# Patient Record
Sex: MALE | Race: WHITE | NOT HISPANIC OR LATINO | Employment: FULL TIME | URBAN - METROPOLITAN AREA
[De-identification: names, ages, dates, MRNs, and addresses within clinical notes are randomized per-mention and may not be internally consistent; named-entity substitution may affect disease eponyms.]

---

## 2021-09-30 ENCOUNTER — LAB REQUISITION (OUTPATIENT)
Dept: LAB | Facility: HOSPITAL | Age: 61
End: 2021-09-30

## 2021-09-30 DIAGNOSIS — Z00.00 ENCOUNTER FOR GENERAL ADULT MEDICAL EXAMINATION WITHOUT ABNORMAL FINDINGS: ICD-10-CM

## 2021-09-30 LAB — SARS-COV-2 ORF1AB RESP QL NAA+PROBE: NOT DETECTED

## 2021-09-30 PROCEDURE — U0004 COV-19 TEST NON-CDC HGH THRU: HCPCS | Performed by: INTERNAL MEDICINE

## 2022-05-03 ENCOUNTER — APPOINTMENT (OUTPATIENT)
Dept: ULTRASOUND IMAGING | Facility: HOSPITAL | Age: 62
End: 2022-05-03

## 2022-05-03 ENCOUNTER — APPOINTMENT (OUTPATIENT)
Dept: GENERAL RADIOLOGY | Facility: HOSPITAL | Age: 62
End: 2022-05-03

## 2022-05-03 ENCOUNTER — HOSPITAL ENCOUNTER (EMERGENCY)
Facility: HOSPITAL | Age: 62
Discharge: HOME | End: 2022-05-03
Attending: EMERGENCY MEDICINE | Admitting: EMERGENCY MEDICINE

## 2022-05-03 VITALS
HEART RATE: 88 BPM | TEMPERATURE: 98.2 F | DIASTOLIC BLOOD PRESSURE: 112 MMHG | BODY MASS INDEX: 33.18 KG/M2 | OXYGEN SATURATION: 96 % | SYSTOLIC BLOOD PRESSURE: 166 MMHG | RESPIRATION RATE: 16 BRPM | HEIGHT: 72 IN | WEIGHT: 245 LBS

## 2022-05-03 DIAGNOSIS — M79.662 PAIN OF LEFT CALF: Primary | ICD-10-CM

## 2022-05-03 DIAGNOSIS — I10 HYPERTENSION, UNSPECIFIED TYPE: ICD-10-CM

## 2022-05-03 PROCEDURE — 73590 X-RAY EXAM OF LOWER LEG: CPT

## 2022-05-03 PROCEDURE — 99282 EMERGENCY DEPT VISIT SF MDM: CPT

## 2022-05-03 PROCEDURE — 99283 EMERGENCY DEPT VISIT LOW MDM: CPT | Performed by: EMERGENCY MEDICINE

## 2022-05-03 PROCEDURE — 93971 EXTREMITY STUDY: CPT

## 2022-05-03 RX ORDER — CYCLOBENZAPRINE HCL 10 MG
10 TABLET ORAL 3 TIMES DAILY PRN
Qty: 24 TABLET | Refills: 0 | Status: SHIPPED | OUTPATIENT
Start: 2022-05-03 | End: 2023-03-27

## 2022-05-03 RX ORDER — DICLOFENAC SODIUM 75 MG/1
75 TABLET, DELAYED RELEASE ORAL 2 TIMES DAILY PRN
Qty: 20 TABLET | Refills: 0 | Status: SHIPPED | OUTPATIENT
Start: 2022-05-03 | End: 2022-11-17

## 2022-05-03 NOTE — ED PROVIDER NOTES
Subjective     History provided by:  Patient    History of Present Illness    · Chief complaint: Left leg pain    · Location: Lateral aspect of the left calf.    · Quality/Severity: Pain is described as moderate and transient.    · Timing/Onset: Darted 5 days ago.    · Modifying Factors: He notices the pain primarily after he has an hour long drive to work.  Plantarflexion of the left ankle exacerbates the pain.  The pain is not present when he is up walking around at work.    · Associated symptoms: He denies any swelling of the lower extremity.  Denies any pain on the medial aspect of the left calf.  Denies ankle or knee pain.  Denies thigh pain or low back pain.    · Narrative: The patient is a 62-year-old white male complaining of a 5-day history of pain in the lateral aspect of the left calf.  The patient works as an  and drives an hour to work and notices the pain primarily just after he completes his long commute.  The pain usually subsides when he is up walking around at work.  He states plantar flexion of the left ankle exacerbates the pain.  He has had no swelling of the left lower extremity.  No recent trauma.  There is no pain in the medial aspect of the left lower leg.    Review of Systems   Constitutional: Negative for activity change, appetite change, chills, diaphoresis, fatigue and fever.   HENT: Negative for congestion, dental problem, ear pain, hearing loss, mouth sores, postnasal drip, rhinorrhea, sinus pressure, sore throat and voice change.    Eyes: Negative for photophobia, pain, discharge, redness and visual disturbance.   Respiratory: Negative for cough, chest tightness, shortness of breath, wheezing and stridor.    Cardiovascular: Negative for chest pain, palpitations and leg swelling.   Gastrointestinal: Negative for abdominal pain, diarrhea, nausea and vomiting.   Genitourinary: Negative for difficulty urinating, dysuria, flank pain, frequency, hematuria and urgency.  "  Musculoskeletal: Negative for arthralgias, back pain, gait problem, joint swelling, myalgias, neck pain and neck stiffness.   Skin: Negative for color change and rash.   Neurological: Negative for dizziness, tremors, seizures, syncope, facial asymmetry, speech difficulty, weakness, light-headedness, numbness and headaches.   Hematological: Negative for adenopathy.   Psychiatric/Behavioral: Negative.  Negative for confusion and decreased concentration. The patient is not nervous/anxious.      No past medical history on file.  BP (!) 162/102 (BP Location: Right arm, Patient Position: Sitting)   Pulse 82   Temp 98.2 °F (36.8 °C) (Oral)   Resp 16   Ht 182.9 cm (72\")   Wt 111 kg (245 lb)   SpO2 96%   BMI 33.23 kg/m²     No past medical history on file.    No Known Allergies    No past surgical history on file.    No family history on file.    Social History     Socioeconomic History   • Marital status: Unknown           Objective   Physical Exam  Vitals and nursing note reviewed.   Constitutional:       General: He is not in acute distress.     Appearance: Normal appearance. He is not ill-appearing, toxic-appearing or diaphoretic.      Comments: The patient appears healthy in no acute distress.  Review of his vital signs: His blood pressure is elevated 162/102, remainder vital signs within normal limits.   HENT:      Head: Normocephalic and atraumatic.   Eyes:      General: No scleral icterus.     Conjunctiva/sclera: Conjunctivae normal.   Musculoskeletal:         General: No swelling, tenderness, deformity or signs of injury. Normal range of motion.      Left lower leg: No edema.      Comments: The patient is no swelling or deformity of the left lower leg.  There is no swelling of the left ankle or foot.  The left foot is neurovascular intact.  There is no soft tissue tenderness of the left lower leg.  No varicose veins.  No venous cords.   Skin:     General: Skin is warm and dry.      Capillary Refill: " Capillary refill takes less than 2 seconds.      Coloration: Skin is not pale.      Findings: No bruising, erythema or rash.   Neurological:      General: No focal deficit present.      Mental Status: He is alert and oriented to person, place, and time.      Cranial Nerves: No cranial nerve deficit.      Sensory: No sensory deficit.      Motor: No weakness.   Psychiatric:         Mood and Affect: Mood normal.         Behavior: Behavior normal.         Thought Content: Thought content normal.         Judgment: Judgment normal.         Procedures           ED Course  ED Course as of 05/03/22 1257   Tue May 03, 2022   1244 X-rays of the patient's left tibia and fibula were normal to mine and the radiologist interpretation.  Venous Doppler of the left lower extremity was negative for DVT. [TP]   1245 Is my impression the patient's discomfort on his lateral left calf is due to muscle spasm/inflammation.  This is supported by plantar flexion of the left ankle exacerbates the discomfort.  His left foot is warm with good pulses and normal sensation.  There is no palpable tenderness.  He will be prescribed Voltaren.  He is instructed to apply heat to the affected area.  He will be referred to follow-up with [facility] if symptoms persist. [TP]   1251 The patient's blood pressure was noted to be elevated.  The patient states that his blood pressure is not normally elevated.  I instructed the patient to follow-up with his PCP Dr. Huggins concerning his blood pressure.  Also recommended he keep a log and check it daily. [TP]      ED Course User Index  [TP] Wilder Rico MD                                                 MDM  Number of Diagnoses or Management Options  Hypertension, unspecified type: new and does not require workup  Pain of left calf: new and requires workup     Amount and/or Complexity of Data Reviewed  Tests in the radiology section of CPT®: ordered and reviewed    Risk of Complications,  Morbidity, and/or Mortality  Presenting problems: moderate  Diagnostic procedures: high  Management options: moderate    Patient Progress  Patient progress: stable      Final diagnoses:   Pain of left calf   Hypertension, unspecified type       ED Disposition  ED Disposition     ED Disposition   Discharge    Condition   Stable    Comment   --             Gayathri Huggins MD  [address]  [place] [address] [phone]    Schedule an appointment as soon as possible for a visit   next available to follow-up on your blood pressure.    [facility], APRN  [address]  [address]  [place] [id number] [phone]    Schedule an appointment as soon as possible for a visit in 1 week  If your left calf pain does not start to improve.         Medication List      New Prescriptions    cyclobenzaprine 10 MG tablet  Commonly known as: FLEXERIL  Take 1 tablet by mouth 3 (Three) Times a Day As Needed for Muscle Spasms for up to 24 doses.     diclofenac 75 MG EC tablet  Commonly known as: VOLTAREN  Take 1 tablet by mouth 2 (Two) Times a Day As Needed (Pain) for up to 20 doses.           Where to Get Your Medications      These medications were sent to [id number] [place] [address] [address] [facility] [address] - [phone] PH - [phone]   [address], [place] [address]    Phone: [phone]   · cyclobenzaprine 10 MG tablet  · diclofenac 75 MG EC tablet          Wilder Rico MD  05/03/22 1106      Labs Reviewed - No data to display  US Venous Doppler Lower Extremity Left (duplex)   Final Result   Negative examination.  No evidence of left lower extremity DVT.       This report was finalized on 5/3/2022 12:37 PM by Dr. Ramirez Phelps MD.          XR Tibia Fibula 2 View Left   Final Result   Negative left tibia/fibula.       This report was finalized on 5/3/2022 12:38 PM by Dr. Ramirez Phelps MD.                 Medication List      New Prescriptions     cyclobenzaprine 10 MG tablet  Commonly known as: FLEXERIL  Take 1 tablet by mouth 3 (Three) Times a Day As Needed for Muscle Spasms for up to 24 doses.     diclofenac 75 MG EC tablet  Commonly known as: VOLTAREN  Take 1 tablet by mouth 2 (Two) Times a Day As Needed (Pain) for up to 20 doses.           Where to Get Your Medications      These medications were sent to [id number] - [place] [facility] [address] - [phone]  - [phone] [address] [address]    Phone: [phone]   · cyclobenzaprine 10 MG tablet  · diclofenac 75 MG EC tablet              Wilder Rico MD  05/03/22 3143

## 2022-05-05 ENCOUNTER — TELEPHONE (OUTPATIENT)
Dept: ORTHOPEDIC SURGERY | Facility: CLINIC | Age: 62
End: 2022-05-05

## 2022-11-17 ENCOUNTER — OFFICE VISIT (OUTPATIENT)
Dept: ORTHOPEDIC SURGERY | Facility: CLINIC | Age: 62
End: 2022-11-17

## 2022-11-17 VITALS
BODY MASS INDEX: 33.86 KG/M2 | DIASTOLIC BLOOD PRESSURE: 91 MMHG | SYSTOLIC BLOOD PRESSURE: 165 MMHG | HEIGHT: 72 IN | HEART RATE: 73 BPM | WEIGHT: 250 LBS

## 2022-11-17 DIAGNOSIS — G89.29 CHRONIC LEFT SHOULDER PAIN: Primary | ICD-10-CM

## 2022-11-17 DIAGNOSIS — M75.112 NONTRAUMATIC INCOMPLETE TEAR OF LEFT ROTATOR CUFF: ICD-10-CM

## 2022-11-17 DIAGNOSIS — M25.512 CHRONIC LEFT SHOULDER PAIN: Primary | ICD-10-CM

## 2022-11-17 PROCEDURE — 99203 OFFICE O/P NEW LOW 30 MIN: CPT | Performed by: INTERNAL MEDICINE

## 2022-11-17 PROCEDURE — 20610 DRAIN/INJ JOINT/BURSA W/O US: CPT | Performed by: INTERNAL MEDICINE

## 2022-11-17 PROCEDURE — 73030 X-RAY EXAM OF SHOULDER: CPT | Performed by: INTERNAL MEDICINE

## 2022-11-17 RX ORDER — MODAFINIL 200 MG/1
TABLET ORAL
COMMUNITY
Start: 2022-11-06

## 2022-11-17 RX ORDER — TRIAMCINOLONE ACETONIDE 40 MG/ML
80 INJECTION, SUSPENSION INTRA-ARTICULAR; INTRAMUSCULAR
Status: COMPLETED | OUTPATIENT
Start: 2022-11-17 | End: 2022-11-17

## 2022-11-17 RX ORDER — OXYCODONE HYDROCHLORIDE 15 MG/1
TABLET ORAL
COMMUNITY
Start: 2022-10-20 | End: 2023-03-27

## 2022-11-17 RX ORDER — PANTOPRAZOLE SODIUM 40 MG/1
40 TABLET, DELAYED RELEASE ORAL DAILY
COMMUNITY
End: 2023-03-27 | Stop reason: SDUPTHER

## 2022-11-17 RX ORDER — ZOLPIDEM TARTRATE 10 MG/1
TABLET ORAL
COMMUNITY
Start: 2022-11-06

## 2022-11-17 RX ORDER — LIDOCAINE HYDROCHLORIDE 10 MG/ML
4 INJECTION, SOLUTION EPIDURAL; INFILTRATION; INTRACAUDAL; PERINEURAL
Status: COMPLETED | OUTPATIENT
Start: 2022-11-17 | End: 2022-11-17

## 2022-11-17 RX ADMIN — LIDOCAINE HYDROCHLORIDE 4 ML: 10 INJECTION, SOLUTION EPIDURAL; INFILTRATION; INTRACAUDAL; PERINEURAL at 10:04

## 2022-11-17 RX ADMIN — TRIAMCINOLONE ACETONIDE 80 MG: 40 INJECTION, SUSPENSION INTRA-ARTICULAR; INTRAMUSCULAR at 10:04

## 2022-11-17 NOTE — PROGRESS NOTES
"Subjective:     Patient ID: Sawyer Moraes is a 62 y.o. male.    Chief Complaint:    History of Present Illness  Sawyer Moraes presents to clinic today for evaluation of  left shoulder pain.  The patient was a previous patient of [facility] orth[facility] but recently moved to [place] and would like to establish care here.  He had MRI of his left shoulder in the past for left shoulder pain which showed some mild to moderate tearing of his rotator cuff.  He did multiple rounds of physical therapy had multiple injections which alleviated all of his symptoms.  He is here today for another injection.  He states that he has some pain but does not endorse significant weakness.  He works as an  and does a lot of overhead activities.  He is right-hand dominant.     Social History     Occupational History   • Not on file   Tobacco Use   • Smoking status: Never     Passive exposure: Never   • Smokeless tobacco: Never   Vaping Use   • Vaping Use: Never used   Substance and Sexual Activity   • Alcohol use: Yes     Alcohol/week: 1.0 standard drink     Types: 1 Shots of liquor per week     Comment: Occasional drink with dinner   • Drug use: Never   • Sexual activity: Yes     Partners: Female     Birth control/protection: Vasectomy      Past Medical History:   Diagnosis Date   • Knee swelling 2020    Saw Dr. Rivas Patton for knee problem.   • Lumbosacral disc disease 2015 herniated disk    Epdural helped. Take oxycodone for pain   • Rotator cuff syndrome 2020    Was seeing Zulay prakash, have been taking Cortisone shots which help. PT. at [facility] for 6 months helped. Pain is returning.     History reviewed. No pertinent surgical history.    Family History   Problem Relation Age of Onset   • Diabetes Father    • Heart disease Father                  Objective:  Vitals:    11/17/22 0917   BP: 165/91   Pulse: 73   Weight: 113 kg (250 lb)   Height: 182.9 cm (72\")         11/17/22 0917   Weight: 113 kg (250 lb)     Body mass " index is 33.91 kg/m².        Right Shoulder Exam     Range of Motion   Internal rotation 0 degrees: normal   Internal rotation 90 degrees: normal     Muscle Strength   Abduction: 5/5   Internal rotation: 5/5   External rotation: 5/5   Supraspinatus: 5/5   Subscapularis: 5/5   Biceps: 5/5       Left Shoulder Exam     Tenderness   Left shoulder tenderness location: Global.    Range of Motion   Active abduction: normal   Passive abduction: normal   Extension: normal   External rotation: normal   Forward flexion: normal   Internal rotation 0 degrees: abnormal   Internal rotation 90 degrees: normal     Muscle Strength   Abduction: 5/5   Internal rotation: 5/5   External rotation: 5/5   Supraspinatus: 5/5   Subscapularis: 4/5   Biceps: 5/5     Tests   Apprehension: negative  Impingement: negative  Drop arm: negative    Other   Erythema: absent  Scars: absent  Sensation: normal  Pulse: present                Imaging: Views of the left shoulder ordered and reviewed by myself in the office today  Indication: Shoulder pain  Findings: X-rays demonstrate mild glenohumeral and AC joint arthritis.  Glenohumeral joint appears to be reduced without superior migration.  Comparative studies: None    Assessment:        1. Chronic left shoulder pain    2. Nontraumatic incomplete tear of left rotator cuff           Plan:    Large Joint Arthrocentesis: L subacromial bursa  Date/Time: 11/17/2022 10:04 AM  Consent given by: patient  Site marked: site marked  Timeout: Immediately prior to procedure a time out was called to verify the correct patient, procedure, equipment, support staff and site/side marked as required   Supporting Documentation  Indications: pain   Procedure Details  Location: shoulder - L subacromial bursa  Preparation: Patient was prepped and draped in the usual sterile fashion  Needle size: 22 G  Medications administered: 4 mL lidocaine PF 1% 1 %; 80 mg triamcinolone acetonide 40 MG/ML  Patient tolerance: patient  tolerated the procedure well with no immediate complications                  1. Discussed treatment options at length with patient at today's visit.  I think the majority of his symptoms are coming from his known incomplete rotator cuff tear of the supraspinatus and subscapularis.  At this time the patient is very active and works as an .  He is not currently interested in surgical intervention.  He would like to have a shot today and continue to work on his home exercises.  2. Follow up: As needed.  If he decides that he would like to discuss surgical intervention he should follow-up with Dr. Altman.      Sawyer Moraes was in agreement with plan and had all questions answered.     Medications:  No orders of the defined types were placed in this encounter.      Followup:  No follow-ups on file.    Diagnoses and all orders for this visit:    1. Chronic left shoulder pain (Primary)  -     XR Shoulder 2+ View Left    2. Nontraumatic incomplete tear of left rotator cuff          Dictated utilizing Dragon dictation

## 2023-01-31 ENCOUNTER — OFFICE VISIT (OUTPATIENT)
Dept: ORTHOPEDIC SURGERY | Facility: CLINIC | Age: 63
End: 2023-01-31
Payer: COMMERCIAL

## 2023-01-31 VITALS
DIASTOLIC BLOOD PRESSURE: 90 MMHG | WEIGHT: 240 LBS | SYSTOLIC BLOOD PRESSURE: 156 MMHG | HEART RATE: 67 BPM | BODY MASS INDEX: 32.51 KG/M2 | HEIGHT: 72 IN

## 2023-01-31 DIAGNOSIS — M25.511 RIGHT SHOULDER PAIN, UNSPECIFIED CHRONICITY: Primary | ICD-10-CM

## 2023-01-31 PROCEDURE — 99213 OFFICE O/P EST LOW 20 MIN: CPT | Performed by: INTERNAL MEDICINE

## 2023-01-31 PROCEDURE — 20610 DRAIN/INJ JOINT/BURSA W/O US: CPT | Performed by: INTERNAL MEDICINE

## 2023-01-31 PROCEDURE — 73030 X-RAY EXAM OF SHOULDER: CPT | Performed by: INTERNAL MEDICINE

## 2023-01-31 RX ORDER — OXYCODONE HYDROCHLORIDE 15 MG/1
15 TABLET ORAL
COMMUNITY
Start: 2023-01-12

## 2023-01-31 RX ORDER — LIDOCAINE HYDROCHLORIDE 10 MG/ML
4 INJECTION, SOLUTION EPIDURAL; INFILTRATION; INTRACAUDAL; PERINEURAL
Status: COMPLETED | OUTPATIENT
Start: 2023-01-31 | End: 2023-01-31

## 2023-01-31 RX ORDER — FLUTICASONE PROPIONATE 50 MCG
1 SPRAY, SUSPENSION (ML) NASAL DAILY
COMMUNITY
Start: 2023-01-26

## 2023-01-31 RX ORDER — TRIAMCINOLONE ACETONIDE 40 MG/ML
80 INJECTION, SUSPENSION INTRA-ARTICULAR; INTRAMUSCULAR
Status: COMPLETED | OUTPATIENT
Start: 2023-01-31 | End: 2023-01-31

## 2023-01-31 RX ORDER — FLUTICASONE FUROATE 100 UG/1
1 POWDER RESPIRATORY (INHALATION) DAILY
COMMUNITY
Start: 2023-01-12 | End: 2023-03-27

## 2023-01-31 RX ORDER — FOLIC ACID 1 MG/1
1 TABLET ORAL DAILY
COMMUNITY
Start: 2022-12-29

## 2023-01-31 RX ADMIN — LIDOCAINE HYDROCHLORIDE 4 ML: 10 INJECTION, SOLUTION EPIDURAL; INFILTRATION; INTRACAUDAL; PERINEURAL at 13:27

## 2023-01-31 RX ADMIN — TRIAMCINOLONE ACETONIDE 80 MG: 40 INJECTION, SUSPENSION INTRA-ARTICULAR; INTRAMUSCULAR at 13:27

## 2023-01-31 NOTE — PROGRESS NOTES
Subjective:     Patient ID: Sawyer Moraes is a 62 y.o. male.    Chief Complaint:    History of Present Illness  Sawyer Moraes returns to clinic today for evaluation of a new problem with his right shoulder.  The patient was seen by myself back in November for his left shoulder and there was concern for rotator cuff tendinitis as the patient does a significant amount of overhead work as an  for [employer].  He denies any traumatic inciting event for his right shoulder and denies any significant limitations in range of motion but there is pain at terminal abduction and forward flexion.  He states it feels just like his left shoulder did and he is hopeful to get a injection into his right shoulder since the left shoulder feels most completely better with 1 shot.     Social History     Occupational History   • Not on file   Tobacco Use   • Smoking status: Never     Passive exposure: Never   • Smokeless tobacco: Never   Vaping Use   • Vaping Use: Never used   Substance and Sexual Activity   • Alcohol use: Yes     Alcohol/week: 1.0 standard drink     Types: 1 Shots of liquor per week     Comment: Occasional drink with dinner   • Drug use: Never   • Sexual activity: Yes     Partners: Female     Birth control/protection: Vasectomy      Past Medical History:   Diagnosis Date   • Knee swelling 2020    Saw Dr. Rivas Patton for knee problem.   • Lumbosacral disc disease 2015 herniated disk    Epdural helped. Take oxycodone for pain   • Rotator cuff syndrome 2020    Was seeing Zulay prakash, have been taking Cortisone shots which help. PT. at [facility] for 6 months helped. Pain is returning.     History reviewed. No pertinent surgical history.    Family History   Problem Relation Age of Onset   • Diabetes Father    • Heart disease Father                  Objective:  There were no vitals filed for this visit.  There were no vitals filed for this visit.  There is no height or weight on file to calculate BMI.        Right  Shoulder Exam     Range of Motion   Active abduction: 120   Passive abduction: 130   Extension: 10   External rotation: 60   Forward flexion: 130   Internal rotation 0 degrees: Sacrum     Muscle Strength   Abduction: 4/5   Internal rotation: 4/5   External rotation: 4/5   Supraspinatus: 4/5   Subscapularis: 4/5   Biceps: 5/5     Other   Erythema: absent  Scars: absent  Sensation: normal  Pulse: present      Left Shoulder Exam     Range of Motion   Active abduction: 130   Extension: 20   External rotation: 60   Forward flexion: 130   Internal rotation 0 degrees: Lumbar     Muscle Strength   Abduction: 5/5   Internal rotation: 5/5   External rotation: 5/5   Supraspinatus: 5/5   Subscapularis: 5/5   Biceps: 5/5                Imaging: 3 views the right shoulder ordered and reviewed by myself in the office today  Indication: Right shoulder pain  Findings: X-rays demonstrate no acute osseous abnormality.  No significant glenohumeral degenerative changes.  There is moderate  Comparative studies: none    Assessment:      No diagnosis found.       Plan:  Large Joint Arthrocentesis: R subacromial bursa  Date/Time: 1/31/2023 1:27 PM  Consent given by: patient  Site marked: site marked  Timeout: Immediately prior to procedure a time out was called to verify the correct patient, procedure, equipment, support staff and site/side marked as required   Supporting Documentation  Indications: pain   Procedure Details  Location: shoulder - R subacromial bursa  Preparation: Patient was prepped and draped in the usual sterile fashion  Needle size: 22 G  Approach: posterior  Medications administered: 80 mg triamcinolone acetonide 40 MG/ML; 4 mL lidocaine PF 1% 1 %  Patient tolerance: patient tolerated the procedure well with no immediate complications                1. Discussed treatment options at length with patient at today's visit.  I discussed with the patient that because of his gradual onset of symptoms and lack of traumatic  inciting event the likelihood of him having a large rotator cuff tear is small.  I discussed with him that since the pain feels just like the other side and is attributed to mostly overhead work likely represents rotator cuff tendinitis.  I discussed with him begin for another injection today if he would like.  Discussed with him that if he fails to have resolution of symptoms or improvement he may call back to the office and we can consider ordering an MRI.  2. Follow up: stephon Moraes was in agreement with plan and had all questions answered.     Medications:  No orders of the defined types were placed in this encounter.      Followup:  No follow-ups on file.    There are no diagnoses linked to this encounter.      Dictated utilizing Dragon dictation

## 2023-03-27 ENCOUNTER — OFFICE VISIT (OUTPATIENT)
Dept: GASTROENTEROLOGY | Facility: CLINIC | Age: 63
End: 2023-03-27
Payer: COMMERCIAL

## 2023-03-27 VITALS
WEIGHT: 249.2 LBS | BODY MASS INDEX: 33.75 KG/M2 | HEIGHT: 72 IN | SYSTOLIC BLOOD PRESSURE: 158 MMHG | DIASTOLIC BLOOD PRESSURE: 90 MMHG

## 2023-03-27 DIAGNOSIS — K21.00 GASTROESOPHAGEAL REFLUX DISEASE WITH ESOPHAGITIS WITHOUT HEMORRHAGE: ICD-10-CM

## 2023-03-27 DIAGNOSIS — R13.19 ESOPHAGEAL DYSPHAGIA: Primary | ICD-10-CM

## 2023-03-27 PROCEDURE — 99204 OFFICE O/P NEW MOD 45 MIN: CPT

## 2023-03-27 RX ORDER — PANTOPRAZOLE SODIUM 40 MG/1
40 TABLET, DELAYED RELEASE ORAL DAILY
Qty: 30 TABLET | Refills: 11 | Status: ON HOLD | OUTPATIENT
Start: 2023-03-27 | End: 2023-04-04 | Stop reason: SDUPTHER

## 2023-03-27 RX ORDER — SUCRALFATE 1 G/1
1 TABLET ORAL
COMMUNITY

## 2023-03-27 NOTE — PROGRESS NOTES
PATIENT INFORMATION  Sawyer Moraes       - 1960    CHIEF COMPLAINT  Chief Complaint   Patient presents with   • Heartburn   • Difficulty Swallowing       HISTORY OF PRESENT ILLNESS  Here today for evaluation of GERD    Prescribed pantoprazole and carafate, but not taking PPI. If drinks acidic drinks gets severe heart burn, tries to drink more water. HB 2 days out of the week. Once a week dysphagia with steak or other hard foods. Cannot drink when food gets stuck. Has to vomit back up when stuck. No nausea. Some bloating, belching. No NSAIDs or OTC AA.     EGD 10 yrs ago and was prescribed PPI, was stopped recently because creatinine elevated, is now on Carafate 3 times a day if remember. Is seeing renal specialist.    Colonoscopy a year ago. Polyps removed not sure when recall.    Kidney specialist next month.      REVIEWED PERTINENT RESULTS/ LABS  No results found for: CASEREPORT, FINALDX  Lab Results   Component Value Date    HGB 15.1 2021    MCV 91.8 2021     2021    ALT 22 2021    AST 27 2021      No results found.    REVIEW OF SYSTEMS  Review of Systems   Constitutional: Negative.    HENT: Positive for trouble swallowing.    Eyes: Negative.    Respiratory: Negative.    Cardiovascular: Negative.    Gastrointestinal: Positive for constipation. Negative for abdominal pain, diarrhea, nausea and vomiting.        GERD   Endocrine: Negative.    Genitourinary: Negative.    Musculoskeletal: Positive for back pain and neck pain.   Skin: Negative.    Allergic/Immunologic: Negative.    Neurological: Negative.    Hematological: Negative.    Psychiatric/Behavioral: The patient is nervous/anxious.          ACTIVE PROBLEMS  Patient Active Problem List    Diagnosis    • Gastroesophageal reflux disease with esophagitis without hemorrhage [K21.00]          PAST MEDICAL HISTORY  Past Medical History:   Diagnosis Date   • Knee swelling     Saw Dr. Rivas Patton for knee problem.   •  "Lumbosacral disc disease 2015 herniated disk    Epdural helped. Take oxycodone for pain   • Rotator cuff syndrome 2020    Was seeing Zulay prakash, have been taking Cortisone shots which help. PT. at [facility] for 6 months helped. Pain is returning.         SURGICAL HISTORY  History reviewed. No pertinent surgical history.      FAMILY HISTORY  Family History   Problem Relation Age of Onset   • Diabetes Father    • Heart disease Father          SOCIAL HISTORY  Social History     Occupational History   • Not on file   Tobacco Use   • Smoking status: Never     Passive exposure: Never   • Smokeless tobacco: Never   Vaping Use   • Vaping Use: Never used   Substance and Sexual Activity   • Alcohol use: Yes     Alcohol/week: 1.0 standard drink     Types: 1 Shots of liquor per week     Comment: Occasional drink with dinner   • Drug use: Never   • Sexual activity: Yes     Partners: Female     Birth control/protection: Vasectomy         CURRENT MEDICATIONS    Current Outpatient Medications:   •  fluticasone (FLONASE) 50 MCG/ACT nasal spray, 1 spray by Each Nare route Daily., Disp: , Rfl:   •  folic acid (FOLVITE) 1 MG tablet, Take 1 tablet by mouth Daily., Disp: , Rfl:   •  modafinil (PROVIGIL) 200 MG tablet, , Disp: , Rfl:   •  oxyCODONE (ROXICODONE) 15 MG immediate release tablet, Take 1 tablet by mouth., Disp: , Rfl:   •  sucralfate (CARAFATE) 1 g tablet, Take 1 tablet by mouth., Disp: , Rfl:   •  zolpidem (AMBIEN) 10 MG tablet, , Disp: , Rfl:   •  pantoprazole (PROTONIX) 40 MG EC tablet, Take 40 mg by mouth Daily. (Patient not taking: Reported on 3/27/2023), Disp: , Rfl:     ALLERGIES  Aspirin, Ibuprofen, and Nsaids    VITALS  Vitals:    03/27/23 0840   BP: 158/90   BP Location: Left arm   Patient Position: Sitting   Cuff Size: Adult   Weight: 113 kg (249 lb 3.2 oz)   Height: 182.9 cm (72.01\")       PHYSICAL EXAM  Debilities/Disabilities Identified: None  Emotional Behavior: Appropriate  Wt Readings from Last 3 " "Encounters:   03/27/23 113 kg (249 lb 3.2 oz)   01/31/23 109 kg (240 lb)   11/17/22 113 kg (250 lb)     Ht Readings from Last 1 Encounters:   03/27/23 182.9 cm (72.01\")     Body mass index is 33.79 kg/m².  Physical Exam  Constitutional:       General: He is not in acute distress.     Appearance: Normal appearance. He is not ill-appearing.   HENT:      Head: Normocephalic and atraumatic.      Mouth/Throat:      Mouth: Mucous membranes are moist.      Pharynx: No posterior oropharyngeal erythema.   Eyes:      General: No scleral icterus.  Cardiovascular:      Rate and Rhythm: Normal rate and regular rhythm.      Heart sounds: Normal heart sounds.   Pulmonary:      Effort: Pulmonary effort is normal.      Breath sounds: Normal breath sounds.   Abdominal:      General: Abdomen is flat. Bowel sounds are normal. There is no distension.      Palpations: Abdomen is soft. There is no mass.      Tenderness: There is no abdominal tenderness. There is no guarding or rebound. Negative signs include Fu's sign.      Hernia: No hernia is present.   Musculoskeletal:      Cervical back: Neck supple.   Skin:     General: Skin is warm.      Capillary Refill: Capillary refill takes less than 2 seconds.   Neurological:      General: No focal deficit present.      Mental Status: He is alert and oriented to person, place, and time.   Psychiatric:         Mood and Affect: Mood normal.         Behavior: Behavior normal.         Thought Content: Thought content normal.         Judgment: Judgment normal.         CLINICAL DATA REVIEWED   reviewed previous lab results and integrated with today's visit, reviewed notes from other physicians and/or last GI encounter, reviewed previous endoscopy results and available photos, reviewed surgical pathology results from previous biopsies    ASSESSMENT  Diagnoses and all orders for this visit:    Esophageal dysphagia  -     Case Request; Standing  -     Case Request    Gastroesophageal reflux disease " with esophagitis without hemorrhage    Other orders  -     Discontinue: DICLOFENAC PO; if needed cream  -     sucralfate (CARAFATE) 1 g tablet; Take 1 tablet by mouth.  -     Follow Anesthesia Guidelines / Protocol; Future  -     Obtain Informed Consent; Standing          PLAN    Resume PPI after labs next week, review with renal  EGD  Avoid meats, stick with purees until EGD, proceed to ER in case of impaction    Return in about 3 months (around 6/27/2023).    I have discussed the above plan with the patient.  They verbalize understanding and are in agreement with the plan.  They have been advised to contact the office for any questions, concerns, or changes related to their health.

## 2023-03-27 NOTE — H&P (VIEW-ONLY)
PATIENT INFORMATION  Sawyer Moraes       - 1960    CHIEF COMPLAINT  Chief Complaint   Patient presents with   • Heartburn   • Difficulty Swallowing       HISTORY OF PRESENT ILLNESS  Here today for evaluation of GERD    Prescribed pantoprazole and carafate, but not taking PPI. If drinks acidic drinks gets severe heart burn, tries to drink more water. HB 2 days out of the week. Once a week dysphagia with steak or other hard foods. Cannot drink when food gets stuck. Has to vomit back up when stuck. No nausea. Some bloating, belching. No NSAIDs or OTC AA.     EGD 10 yrs ago and was prescribed PPI, was stopped recently because creatinine elevated, is now on Carafate 3 times a day if remember. Is seeing renal specialist.    Colonoscopy a year ago. Polyps removed not sure when recall.    Kidney specialist next month.      REVIEWED PERTINENT RESULTS/ LABS  No results found for: CASEREPORT, FINALDX  Lab Results   Component Value Date    HGB 15.1 2021    MCV 91.8 2021     2021    ALT 22 2021    AST 27 2021      No results found.    REVIEW OF SYSTEMS  Review of Systems   Constitutional: Negative.    HENT: Positive for trouble swallowing.    Eyes: Negative.    Respiratory: Negative.    Cardiovascular: Negative.    Gastrointestinal: Positive for constipation. Negative for abdominal pain, diarrhea, nausea and vomiting.        GERD   Endocrine: Negative.    Genitourinary: Negative.    Musculoskeletal: Positive for back pain and neck pain.   Skin: Negative.    Allergic/Immunologic: Negative.    Neurological: Negative.    Hematological: Negative.    Psychiatric/Behavioral: The patient is nervous/anxious.          ACTIVE PROBLEMS  Patient Active Problem List    Diagnosis    • Gastroesophageal reflux disease with esophagitis without hemorrhage [K21.00]          PAST MEDICAL HISTORY  Past Medical History:   Diagnosis Date   • Knee swelling     Saw Dr. Rivas Patton for knee problem.   •  "Lumbosacral disc disease 2015 herniated disk    Epdural helped. Take oxycodone for pain   • Rotator cuff syndrome 2020    Was seeing Zulay prakash, have been taking Cortisone shots which help. PT. at [facility] for 6 months helped. Pain is returning.         SURGICAL HISTORY  History reviewed. No pertinent surgical history.      FAMILY HISTORY  Family History   Problem Relation Age of Onset   • Diabetes Father    • Heart disease Father          SOCIAL HISTORY  Social History     Occupational History   • Not on file   Tobacco Use   • Smoking status: Never     Passive exposure: Never   • Smokeless tobacco: Never   Vaping Use   • Vaping Use: Never used   Substance and Sexual Activity   • Alcohol use: Yes     Alcohol/week: 1.0 standard drink     Types: 1 Shots of liquor per week     Comment: Occasional drink with dinner   • Drug use: Never   • Sexual activity: Yes     Partners: Female     Birth control/protection: Vasectomy         CURRENT MEDICATIONS    Current Outpatient Medications:   •  fluticasone (FLONASE) 50 MCG/ACT nasal spray, 1 spray by Each Nare route Daily., Disp: , Rfl:   •  folic acid (FOLVITE) 1 MG tablet, Take 1 tablet by mouth Daily., Disp: , Rfl:   •  modafinil (PROVIGIL) 200 MG tablet, , Disp: , Rfl:   •  oxyCODONE (ROXICODONE) 15 MG immediate release tablet, Take 1 tablet by mouth., Disp: , Rfl:   •  sucralfate (CARAFATE) 1 g tablet, Take 1 tablet by mouth., Disp: , Rfl:   •  zolpidem (AMBIEN) 10 MG tablet, , Disp: , Rfl:   •  pantoprazole (PROTONIX) 40 MG EC tablet, Take 40 mg by mouth Daily. (Patient not taking: Reported on 3/27/2023), Disp: , Rfl:     ALLERGIES  Aspirin, Ibuprofen, and Nsaids    VITALS  Vitals:    03/27/23 0840   BP: 158/90   BP Location: Left arm   Patient Position: Sitting   Cuff Size: Adult   Weight: 113 kg (249 lb 3.2 oz)   Height: 182.9 cm (72.01\")       PHYSICAL EXAM  Debilities/Disabilities Identified: None  Emotional Behavior: Appropriate  Wt Readings from Last 3 " "Encounters:   03/27/23 113 kg (249 lb 3.2 oz)   01/31/23 109 kg (240 lb)   11/17/22 113 kg (250 lb)     Ht Readings from Last 1 Encounters:   03/27/23 182.9 cm (72.01\")     Body mass index is 33.79 kg/m².  Physical Exam  Constitutional:       General: He is not in acute distress.     Appearance: Normal appearance. He is not ill-appearing.   HENT:      Head: Normocephalic and atraumatic.      Mouth/Throat:      Mouth: Mucous membranes are moist.      Pharynx: No posterior oropharyngeal erythema.   Eyes:      General: No scleral icterus.  Cardiovascular:      Rate and Rhythm: Normal rate and regular rhythm.      Heart sounds: Normal heart sounds.   Pulmonary:      Effort: Pulmonary effort is normal.      Breath sounds: Normal breath sounds.   Abdominal:      General: Abdomen is flat. Bowel sounds are normal. There is no distension.      Palpations: Abdomen is soft. There is no mass.      Tenderness: There is no abdominal tenderness. There is no guarding or rebound. Negative signs include Fu's sign.      Hernia: No hernia is present.   Musculoskeletal:      Cervical back: Neck supple.   Skin:     General: Skin is warm.      Capillary Refill: Capillary refill takes less than 2 seconds.   Neurological:      General: No focal deficit present.      Mental Status: He is alert and oriented to person, place, and time.   Psychiatric:         Mood and Affect: Mood normal.         Behavior: Behavior normal.         Thought Content: Thought content normal.         Judgment: Judgment normal.         CLINICAL DATA REVIEWED   reviewed previous lab results and integrated with today's visit, reviewed notes from other physicians and/or last GI encounter, reviewed previous endoscopy results and available photos, reviewed surgical pathology results from previous biopsies    ASSESSMENT  Diagnoses and all orders for this visit:    Esophageal dysphagia  -     Case Request; Standing  -     Case Request    Gastroesophageal reflux disease " with esophagitis without hemorrhage    Other orders  -     Discontinue: DICLOFENAC PO; if needed cream  -     sucralfate (CARAFATE) 1 g tablet; Take 1 tablet by mouth.  -     Follow Anesthesia Guidelines / Protocol; Future  -     Obtain Informed Consent; Standing          PLAN    Resume PPI after labs next week, review with renal  EGD  Avoid meats, stick with purees until EGD, proceed to ER in case of impaction    Return in about 3 months (around 6/27/2023).    I have discussed the above plan with the patient.  They verbalize understanding and are in agreement with the plan.  They have been advised to contact the office for any questions, concerns, or changes related to their health.

## 2023-03-31 NOTE — SIGNIFICANT NOTE
Education provided the Patient on the following:    - Nothing to Eat or Drink after MN the night before the procedure  -You will need to have someone drive you home after your EGD and remain with you for 24 hours after the EGD  - The date of your EGD, your are welcome to have one visitor at bedside or remain within 10-15 minutes of [facility]  -Please wear warm socks when you arrive for your EGD  -Remove all jewelry and leave any valuables before arriving on the date of your procedure (all will have to be removed before leaving preop)  -You will need to arrive at 1430 on 4/4/23 EGD  -Feel free to contact us at: [phone] with any additional questions/concerns

## 2023-04-03 ENCOUNTER — LAB (OUTPATIENT)
Dept: LAB | Facility: HOSPITAL | Age: 63
End: 2023-04-03
Payer: COMMERCIAL

## 2023-04-03 ENCOUNTER — TRANSCRIBE ORDERS (OUTPATIENT)
Dept: ADMINISTRATIVE | Facility: HOSPITAL | Age: 63
End: 2023-04-03
Payer: COMMERCIAL

## 2023-04-03 DIAGNOSIS — M15.0 PRIMARY GENERALIZED HYPERTROPHIC OSTEOARTHROSIS: ICD-10-CM

## 2023-04-03 DIAGNOSIS — R79.89 HYPOURICEMIA: ICD-10-CM

## 2023-04-03 DIAGNOSIS — R79.89 HYPOURICEMIA: Primary | ICD-10-CM

## 2023-04-03 LAB
ALBUMIN SERPL-MCNC: 4.5 G/DL (ref 3.5–5.2)
ALBUMIN UR-MCNC: 2.8 MG/DL
ANION GAP SERPL CALCULATED.3IONS-SCNC: 12.1 MMOL/L (ref 5–15)
BACTERIA UR QL AUTO: ABNORMAL /HPF
BILIRUB UR QL STRIP: NEGATIVE
BUN SERPL-MCNC: 15 MG/DL (ref 8–23)
BUN/CREAT SERPL: 11.8 (ref 7–25)
CALCIUM SPEC-SCNC: 9.9 MG/DL (ref 8.6–10.5)
CHLORIDE SERPL-SCNC: 104 MMOL/L (ref 98–107)
CLARITY UR: ABNORMAL
CO2 SERPL-SCNC: 24.9 MMOL/L (ref 22–29)
COLLECT DURATION TIME UR: 24 HRS
COLOR UR: YELLOW
CREAT SERPL-MCNC: 1.27 MG/DL (ref 0.76–1.27)
CREAT UR-MCNC: 136 MG/DL
CREAT UR-MCNC: 140.5 MG/DL
CREAT UR-MCNC: 140.5 MG/DL
CREATINE 24H UR-MRATE: 1.9 G/24 HR (ref 1–2.4)
EGFRCR SERPLBLD CKD-EPI 2021: 64.3 ML/MIN/1.73
GLUCOSE SERPL-MCNC: 101 MG/DL (ref 65–99)
GLUCOSE UR STRIP-MCNC: NEGATIVE MG/DL
HGB UR QL STRIP.AUTO: ABNORMAL
HYALINE CASTS UR QL AUTO: ABNORMAL /LPF
KETONES UR QL STRIP: NEGATIVE
LEUKOCYTE ESTERASE UR QL STRIP.AUTO: NEGATIVE
MICROALBUMIN/CREAT UR: 19.9 MG/G
NITRITE UR QL STRIP: NEGATIVE
PH UR STRIP.AUTO: 6.5 [PH] (ref 5–8)
PHOSPHATE SERPL-MCNC: 3.5 MG/DL (ref 2.5–4.5)
POTASSIUM SERPL-SCNC: 4.5 MMOL/L (ref 3.5–5.2)
PROT ?TM UR-MCNC: 14 MG/DL
PROT UR QL STRIP: NEGATIVE
PROT/CREAT UR: 99.6 MG/G CREA (ref 0–200)
RBC # UR STRIP: ABNORMAL /HPF
REF LAB TEST METHOD: ABNORMAL
SODIUM SERPL-SCNC: 141 MMOL/L (ref 136–145)
SP GR UR STRIP: 1.02 (ref 1–1.03)
SPECIMEN VOL 24H UR: 1400 ML
SQUAMOUS #/AREA URNS HPF: ABNORMAL /HPF
UROBILINOGEN UR QL STRIP: ABNORMAL
WBC # UR STRIP: ABNORMAL /HPF

## 2023-04-03 PROCEDURE — 81050 URINALYSIS VOLUME MEASURE: CPT

## 2023-04-03 PROCEDURE — 82570 ASSAY OF URINE CREATININE: CPT

## 2023-04-03 PROCEDURE — 81001 URINALYSIS AUTO W/SCOPE: CPT

## 2023-04-03 PROCEDURE — 84156 ASSAY OF PROTEIN URINE: CPT

## 2023-04-03 PROCEDURE — 82043 UR ALBUMIN QUANTITATIVE: CPT

## 2023-04-03 PROCEDURE — 80069 RENAL FUNCTION PANEL: CPT

## 2023-04-03 PROCEDURE — 36415 COLL VENOUS BLD VENIPUNCTURE: CPT

## 2023-04-04 ENCOUNTER — HOSPITAL ENCOUNTER (OUTPATIENT)
Facility: SURGERY CENTER | Age: 63
Setting detail: HOSPITAL OUTPATIENT SURGERY
Discharge: HOME | End: 2023-04-04
Attending: INTERNAL MEDICINE | Admitting: INTERNAL MEDICINE
Payer: COMMERCIAL

## 2023-04-04 ENCOUNTER — ANESTHESIA (OUTPATIENT)
Dept: SURGERY | Facility: SURGERY CENTER | Age: 63
End: 2023-04-04
Payer: COMMERCIAL

## 2023-04-04 ENCOUNTER — ANESTHESIA EVENT (OUTPATIENT)
Dept: SURGERY | Facility: SURGERY CENTER | Age: 63
End: 2023-04-04
Payer: COMMERCIAL

## 2023-04-04 VITALS
WEIGHT: 250 LBS | TEMPERATURE: 98.6 F | BODY MASS INDEX: 33.86 KG/M2 | SYSTOLIC BLOOD PRESSURE: 163 MMHG | RESPIRATION RATE: 16 BRPM | OXYGEN SATURATION: 99 % | HEART RATE: 90 BPM | DIASTOLIC BLOOD PRESSURE: 97 MMHG | HEIGHT: 72 IN

## 2023-04-04 DIAGNOSIS — R13.19 ESOPHAGEAL DYSPHAGIA: ICD-10-CM

## 2023-04-04 DIAGNOSIS — K21.00 GASTROESOPHAGEAL REFLUX DISEASE WITH ESOPHAGITIS WITHOUT HEMORRHAGE: ICD-10-CM

## 2023-04-04 PROCEDURE — 43249 ESOPH EGD DILATION <30 MM: CPT | Performed by: INTERNAL MEDICINE

## 2023-04-04 PROCEDURE — 43239 EGD BIOPSY SINGLE/MULTIPLE: CPT | Performed by: INTERNAL MEDICINE

## 2023-04-04 PROCEDURE — C1726 CATH, BAL DIL, NON-VASCULAR: HCPCS | Performed by: INTERNAL MEDICINE

## 2023-04-04 PROCEDURE — 25010000002 PROPOFOL 10 MG/ML EMULSION: Performed by: NURSE ANESTHETIST, CERTIFIED REGISTERED

## 2023-04-04 PROCEDURE — 88305 TISSUE EXAM BY PATHOLOGIST: CPT | Performed by: INTERNAL MEDICINE

## 2023-04-04 RX ORDER — SODIUM CHLORIDE, SODIUM LACTATE, POTASSIUM CHLORIDE, CALCIUM CHLORIDE 600; 310; 30; 20 MG/100ML; MG/100ML; MG/100ML; MG/100ML
1000 INJECTION, SOLUTION INTRAVENOUS CONTINUOUS
Status: DISCONTINUED | OUTPATIENT
Start: 2023-04-04 | End: 2023-04-04 | Stop reason: HOSPADM

## 2023-04-04 RX ORDER — PANTOPRAZOLE SODIUM 40 MG/1
40 TABLET, DELAYED RELEASE ORAL DAILY
Qty: 90 TABLET | Refills: 3 | Status: SHIPPED | OUTPATIENT
Start: 2023-04-04

## 2023-04-04 RX ORDER — SILDENAFIL 25 MG/1
25 TABLET, FILM COATED ORAL DAILY PRN
COMMUNITY

## 2023-04-04 RX ORDER — PROPOFOL 10 MG/ML
VIAL (ML) INTRAVENOUS AS NEEDED
Status: DISCONTINUED | OUTPATIENT
Start: 2023-04-04 | End: 2023-04-04 | Stop reason: SURG

## 2023-04-04 RX ORDER — SODIUM CHLORIDE 0.9 % (FLUSH) 0.9 %
10 SYRINGE (ML) INJECTION AS NEEDED
Status: DISCONTINUED | OUTPATIENT
Start: 2023-04-04 | End: 2023-04-04 | Stop reason: HOSPADM

## 2023-04-04 RX ORDER — LIDOCAINE HYDROCHLORIDE 20 MG/ML
INJECTION, SOLUTION INFILTRATION; PERINEURAL AS NEEDED
Status: DISCONTINUED | OUTPATIENT
Start: 2023-04-04 | End: 2023-04-04 | Stop reason: SURG

## 2023-04-04 RX ORDER — LIDOCAINE HYDROCHLORIDE 10 MG/ML
0.5 INJECTION, SOLUTION INFILTRATION; PERINEURAL ONCE AS NEEDED
Status: DISCONTINUED | OUTPATIENT
Start: 2023-04-04 | End: 2023-04-04 | Stop reason: HOSPADM

## 2023-04-04 RX ADMIN — SODIUM CHLORIDE, POTASSIUM CHLORIDE, SODIUM LACTATE AND CALCIUM CHLORIDE 1000 ML: 600; 310; 30; 20 INJECTION, SOLUTION INTRAVENOUS at 13:49

## 2023-04-04 RX ADMIN — PROPOFOL 100 MG: 10 INJECTION, EMULSION INTRAVENOUS at 14:01

## 2023-04-04 RX ADMIN — LIDOCAINE HYDROCHLORIDE 100 MG: 20 INJECTION, SOLUTION INFILTRATION; PERINEURAL at 14:01

## 2023-04-04 RX ADMIN — PROPOFOL 200 MCG/KG/MIN: 10 INJECTION, EMULSION INTRAVENOUS at 14:01

## 2023-04-04 NOTE — BRIEF OP NOTE
ESOPHAGOGASTRODUODENOSCOPY  Progress Note    Sawyer Tors  4/4/2023    Pre-op Diagnosis:   Esophageal dysphagia [R13.19]       Post-Op Diagnosis Codes:     * Esophageal dysphagia [R13.19]     * Esophageal stricture [K22.2]     * Ulcerative esophagitis [K22.10]     * Gastritis [K29.70]     * Duodenitis [535.6]    Procedure/CPT® Codes:        Procedure(s):  ESOPHAGOGASTRODUODENOSCOPY with 15-18mm Balloon        Surgeon(s):  Quincy Tong MD    Anesthesia: Monitored Anesthesia Care    Staff:   Endo Technician: Ashley Rodriguez  Endo Nurse: Milly Leung RN         Estimated Blood Loss: none    Urine Voided: * No values recorded between 4/4/2023  1:53 PM and 4/4/2023  2:15 PM *    Specimens:                Specimens     ID Source Type Tests Collected By Collected At Frozen?    A Small Intestine Tissue · TISSUE PATHOLOGY EXAM   Quincy Tong MD 4/4/23 1408 No    Description: duodenum Biopsy    This specimen was not marked as sent.    B Gastric, Antrum Tissue · TISSUE PATHOLOGY EXAM   Quincy Tong MD 4/4/23 1411 No    Description: gastric biopsy    This specimen was not marked as sent.    C Esophagus, Distal Tissue · TISSUE PATHOLOGY EXAM   Quincy Tong MD 4/4/23 1411 No    Description: Distal Esophagus Biopsy    This specimen was not marked as sent.                Drains: * No LDAs found *    Findings: Mild Erythema Duodenal Bulb-Biopsy  Mild gastritis-Biopsy  Ulcerative Esophagitis-Biopsy  Esophageal Stricture-Dilated 18 mm        Complications: None          Quincy Tong MD     Date: 4/4/2023  Time: 14:17 EDT

## 2023-04-04 NOTE — INTERVAL H&P NOTE
"Vital Signs  Ht 182.9 cm (72\")   Wt 113 kg (250 lb)   BMI 33.91 kg/m²     H&P reviewed. The patient was examined and there are no changes to the H&P.        "

## 2023-04-04 NOTE — ANESTHESIA POSTPROCEDURE EVALUATION
Patient: Sawyer Tors    Procedure Summary     Date: 04/04/23 Room / Location: [facility]    Anesthesia Start: 1353 Anesthesia Stop: 1420    Procedure: ESOPHAGOGASTRODUODENOSCOPY with 15-18mm Balloon (Esophagus) Diagnosis:       Esophageal dysphagia      Esophageal stricture      Ulcerative esophagitis      Gastritis      Duodenitis      (Esophageal dysphagia [R13.19])    Surgeons: Quincy Tong MD Provider: Isis Drake MD    Anesthesia Type: MAC ASA Status: 2          Anesthesia Type: MAC    Vitals  Vitals Value Taken Time   /97 04/04/23 1442   Temp 37 °C (98.6 °F) 04/04/23 1426   Pulse 90 04/04/23 1432   Resp 16 04/04/23 1442   SpO2 99 % 04/04/23 1442           Post Anesthesia Care and Evaluation    Patient location during evaluation: bedside  Patient participation: complete - patient participated  Level of consciousness: awake and alert  Pain management: adequate    Airway patency: patent  Anesthetic complications: No anesthetic complications  PONV Status: controlled  Cardiovascular status: acceptable  Respiratory status: acceptable  Hydration status: acceptable

## 2023-04-04 NOTE — ANESTHESIA PREPROCEDURE EVALUATION
" Anesthesia Evaluation     Patient summary reviewed and Nursing notes reviewed   NPO Solid Status: > 8 hours  NPO Liquid Status: > 2 hours           Airway   Dental      Pulmonary    (+) sleep apnea,   Cardiovascular         Neuro/Psych  GI/Hepatic/Renal/Endo    (+) obesity,  GERD,      Musculoskeletal     (+) back pain,   Abdominal    Substance History   (+) alcohol use (1/w), drug use (oxycodone 15mg)     OB/GYN          Other                        Anesthesia Plan    ASA 2     MAC     (I have reviewed the patient's history and chart with the patient, including all pertinent laboratory results and imaging. I have explained the risks of anesthesia including but not limited to dental damage, corneal abrasion, nerve injury, MI, stroke, aspiration, and death. Patient has agreed to proceed.     Ht 182.9 cm (72\")   Wt 113 kg (250 lb)   BMI 33.91 kg/m²   )  intravenous induction     Anesthetic plan, risks, benefits, and alternatives have been provided, discussed and informed consent has been obtained with: patient.        CODE STATUS:       "

## 2023-04-06 LAB
LAB AP CASE REPORT: NORMAL
PATH REPORT.FINAL DX SPEC: NORMAL
PATH REPORT.GROSS SPEC: NORMAL

## 2023-04-14 ENCOUNTER — OFFICE VISIT (OUTPATIENT)
Dept: ORTHOPEDIC SURGERY | Facility: CLINIC | Age: 63
End: 2023-04-14
Payer: COMMERCIAL

## 2023-04-14 VITALS — HEIGHT: 72 IN | WEIGHT: 250 LBS | BODY MASS INDEX: 33.86 KG/M2

## 2023-04-14 DIAGNOSIS — M25.512 CHRONIC LEFT SHOULDER PAIN: Primary | ICD-10-CM

## 2023-04-14 DIAGNOSIS — M75.112 NONTRAUMATIC INCOMPLETE TEAR OF LEFT ROTATOR CUFF: ICD-10-CM

## 2023-04-14 DIAGNOSIS — G89.29 CHRONIC LEFT SHOULDER PAIN: Primary | ICD-10-CM

## 2023-04-14 RX ORDER — TAMSULOSIN HYDROCHLORIDE 0.4 MG/1
0.4 CAPSULE ORAL
COMMUNITY
Start: 2023-04-10 | End: 2023-05-10

## 2023-04-14 NOTE — PROGRESS NOTES
Subjective:     Patient ID: Sawyer Moraes is a 63 y.o. male.    Chief Complaint:    History of Present Illness  Sawyer Moraes returns to clinic today for evaluation of left shoulder pain.  The patient was seen by myself back in November and he had left shoulder pain without significant decreased range of motion or limitation.  He had a subacromial injection at that point time which alleviated all of his symptoms up until recently.  He states that he started to notice some worsening pain in his left shoulder again and he presents today for further evaluation and management.  He is hopeful to get another injection and is not currently interested in surgical intervention.  He denies any pain radiating down to his fingertips or numbness in his hand.  The patient works as an  for [employer].     Social History     Occupational History   • Not on file   Tobacco Use   • Smoking status: Never     Passive exposure: Never   • Smokeless tobacco: Never   Vaping Use   • Vaping Use: Never used   Substance and Sexual Activity   • Alcohol use: Yes     Alcohol/week: 1.0 standard drink     Types: 1 Shots of liquor per week     Comment: Occasional drink with dinner   • Drug use: Never   • Sexual activity: Yes     Partners: Female     Birth control/protection: Vasectomy      Past Medical History:   Diagnosis Date   • GERD (gastroesophageal reflux disease)    • Knee swelling 2020    Saw Dr. Rivas Patton for knee problem.   • Lumbosacral disc disease 2015 herniated disk    Epdural helped. Take oxycodone for pain   • Rotator cuff syndrome 2020    Was seeing Zulay prakash, have been taking Cortisone shots which help. [facility]. [facility] helped. Pain is returning.   • Sleep apnea      Past Surgical History:   Procedure Laterality Date   • ENDOSCOPY N/A 4/4/2023    Procedure: ESOPHAGOGASTRODUODENOSCOPY with 15-18mm Balloon;  Surgeon: Quincy Tong MD;  Location: [facility] OR;  Service: Gastroenterology;   "Laterality: N/A;  Ulcerative Esophagitis, duodenitis, Gastritis, Esophageal Stricture   • LAPAROSCOPIC CHOLECYSTECTOMY         Family History   Problem Relation Age of Onset   • Diabetes Father    • Heart disease Father                  Objective:  Vitals:    04/14/23 1001   Weight: 113 kg (250 lb)   Height: 182.9 cm (72\")         04/14/23  1001   Weight: 113 kg (250 lb)     Body mass index is 33.91 kg/m².        Left Shoulder Exam     Range of Motion   Active abduction: normal   Passive abduction: normal   Extension: normal   External rotation: normal   Forward flexion: normal   Internal rotation 0 degrees: normal     Muscle Strength   Abduction: 4/5   Internal rotation: 4/5   External rotation: 5/5   Supraspinatus: 4/5   Subscapularis: 4/5   Biceps: 5/5     Other   Erythema: absent  Scars: absent  Sensation: normal  Pulse: present                Imaging: none  Assessment:        1. Chronic left shoulder pain    2. Nontraumatic incomplete tear of left rotator cuff           Plan:          1. Discussed treatment options at length with patient at today's visit.  I discussed the patient that based off the fact that his shoulder pain recurred following 1 injection I would not recommend further corticosteroid injection at this point time.  I discussed with the patient that I would like to order an MRI to evaluate for rotator cuff integrity.  I discussed with him that further injections could potentially prevent rotator cuff healing in the event that he may need surgery.  I discussed with the patient I will order the MRI today and we will call him with the results.  If it is something that does not need surgery we can discuss physical therapy and another injection.  If the MRI demonstrates surgical pathology I would refer him to my partner Dr. Steve Altman for further evaluation and management and surgical consultation.  The patient voiced understanding and agreement with the plan.  2. Follow up: After MRI      Sawyer" Tors was in agreement with plan and had all questions answered.     Medications:  No orders of the defined types were placed in this encounter.      Followup:  No follow-ups on file.    Diagnoses and all orders for this visit:    1. Chronic left shoulder pain (Primary)  -     MRI Shoulder Left Without Contrast; Future    2. Nontraumatic incomplete tear of left rotator cuff  -     MRI Shoulder Left Without Contrast; Future          Dictated utilizing Dragon dictation

## 2023-04-24 ENCOUNTER — TRANSCRIBE ORDERS (OUTPATIENT)
Dept: ADMINISTRATIVE | Facility: HOSPITAL | Age: 63
End: 2023-04-24
Payer: COMMERCIAL

## 2023-04-24 DIAGNOSIS — R33.8 BENIGN LOCALIZED HYPERPLASIA OF PROSTATE WITH URINARY RETENTION: Primary | ICD-10-CM

## 2023-04-24 DIAGNOSIS — N40.1 BENIGN LOCALIZED HYPERPLASIA OF PROSTATE WITH URINARY RETENTION: Primary | ICD-10-CM

## 2023-05-02 DIAGNOSIS — K21.00 GASTROESOPHAGEAL REFLUX DISEASE WITH ESOPHAGITIS WITHOUT HEMORRHAGE: Primary | ICD-10-CM

## 2023-05-03 ENCOUNTER — HOSPITAL ENCOUNTER (OUTPATIENT)
Dept: ULTRASOUND IMAGING | Facility: HOSPITAL | Age: 63
Discharge: HOME | End: 2023-05-03
Payer: COMMERCIAL

## 2023-05-03 ENCOUNTER — HOSPITAL ENCOUNTER (OUTPATIENT)
Dept: MRI IMAGING | Facility: HOSPITAL | Age: 63
Discharge: HOME | End: 2023-05-03
Payer: COMMERCIAL

## 2023-05-03 DIAGNOSIS — G89.29 CHRONIC LEFT SHOULDER PAIN: ICD-10-CM

## 2023-05-03 DIAGNOSIS — M75.112 NONTRAUMATIC INCOMPLETE TEAR OF LEFT ROTATOR CUFF: ICD-10-CM

## 2023-05-03 DIAGNOSIS — M25.512 CHRONIC LEFT SHOULDER PAIN: ICD-10-CM

## 2023-05-03 DIAGNOSIS — R33.8 BENIGN LOCALIZED HYPERPLASIA OF PROSTATE WITH URINARY RETENTION: ICD-10-CM

## 2023-05-03 DIAGNOSIS — N40.1 BENIGN LOCALIZED HYPERPLASIA OF PROSTATE WITH URINARY RETENTION: ICD-10-CM

## 2023-05-03 PROCEDURE — 73221 MRI JOINT UPR EXTREM W/O DYE: CPT

## 2023-05-03 PROCEDURE — 76775 US EXAM ABDO BACK WALL LIM: CPT

## 2023-05-10 ENCOUNTER — TELEPHONE (OUTPATIENT)
Dept: ORTHOPEDIC SURGERY | Facility: CLINIC | Age: 63
End: 2023-05-10
Payer: COMMERCIAL

## 2023-05-10 NOTE — TELEPHONE ENCOUNTER
Patient called about results of MRI (in chart), wanting to know if he needs to fwp with you, or be referred to Dr Altman? Please Advise.

## 2023-07-24 ENCOUNTER — OFFICE VISIT (OUTPATIENT)
Dept: GASTROENTEROLOGY | Facility: CLINIC | Age: 63
End: 2023-07-24
Payer: COMMERCIAL

## 2023-07-24 VITALS
HEIGHT: 72 IN | BODY MASS INDEX: 34.27 KG/M2 | DIASTOLIC BLOOD PRESSURE: 104 MMHG | WEIGHT: 253 LBS | SYSTOLIC BLOOD PRESSURE: 154 MMHG

## 2023-07-24 DIAGNOSIS — K21.00 GASTROESOPHAGEAL REFLUX DISEASE WITH ESOPHAGITIS WITHOUT HEMORRHAGE: Primary | ICD-10-CM

## 2023-07-24 DIAGNOSIS — Z87.19 HISTORY OF ESOPHAGEAL STRICTURE: ICD-10-CM

## 2023-07-24 DIAGNOSIS — K22.70 BARRETT'S ESOPHAGUS WITHOUT DYSPLASIA: ICD-10-CM

## 2023-07-24 PROCEDURE — 99213 OFFICE O/P EST LOW 20 MIN: CPT

## 2023-07-24 RX ORDER — TAMSULOSIN HYDROCHLORIDE 0.4 MG/1
1 CAPSULE ORAL DAILY
COMMUNITY

## 2023-07-24 RX ORDER — CYCLOBENZAPRINE HCL 5 MG
5 TABLET ORAL 2 TIMES DAILY PRN
COMMUNITY
Start: 2023-07-10

## 2023-07-24 NOTE — PROGRESS NOTES
PATIENT INFORMATION  Sawyer Moraes       - 1960    CHIEF COMPLAINT  Chief Complaint   Patient presents with    Reflux with Saavedra's     Gastritis    Stricture       HISTORY OF PRESENT ILLNESS    Here today for EGD follow-up    2023 EGD for HB and dysphagia, positive for stricture, mild chronic gastritis, erosive reflux esophagitis with barretts, no dysplasia, celiac, HP. Avoid NSAIDs and continue daily pantoprazole to prevent restricturing and dysplasia. Reviewed path and images with patient along with barretts management and surveillance.    Per LOV: Prescribed pantoprazole and carafate, but not taking PPI. If drinks acidic drinks gets severe heart burn, tries to drink more water. HB 2 days out of the week. Once a week dysphagia with steak or other hard foods. Cannot drink when food gets stuck. Has to vomit back up when stuck. No nausea. Some bloating, belching. Resume daily PPI. TODAY: 1 episode since brought on by carbonation and Scarecrow Visual Effectss, went down. Now feeling better, no HB unless over eating and uncommon, no OTC, no NSAIDs.     Colonoscopy a year ago. Polyps removed not sure when recall. Dr. Estuardo cyr last C/S, [facility]. Will attempt to obtain records.    Reviewed HTN with patient, not taking meds, reviewed importance of control for prevention of kidney dz, will start taking meds and keep BP journal to review with PCP.      REVIEWED PERTINENT RESULTS/ LABS  Lab Results   Component Value Date    CASEREPORT  2023     Surgical Pathology Report                         Case: [id number]                                  Authorizing Provider:  Quincy Tong        Collected:           2023 02:08 PM                                 MD Shanique                                                                   Ordering Location:     [facility]     Received:            2023 02:50 PM                                 [facility] OR                                                      Pathologist:           Ryland Rai MD                                                         Specimens:   1) - Small Intestine, duodenum Biopsy                                                               2) - Gastric, Antrum, gastric biopsy                                                                3) - Esophagus, Distal, Distal Esophagus Biopsy                                            FINALDX  04/04/2023     1. Small Bowel, Duodenum, Biopsy: Benign small bowel mucosa with    A. Normal intact villous surface.   B. No significant inflammation, no granulomas.   C. No viral inclusions or other organisms on routinely stained sections.     2. Stomach, Antrum, Biopsy: Benign gastric mucosa with   A. Fragment consistent with fundic gland polyp.   B. Mild chronic inflammation.   C. No intestinal metaplasia.   D. No Helicobacter pylori.    3. Esophagus, Distal, Biopsy: Benign squamous and gastric mucosa with   A. Intestinal metaplasia consistent with Saavedra's esophagus with reactive changes and no definitive dysplasia.   B. Esophagitis consistent with reflux.   C. No Helicobacter pylori.    amina/jse        Lab Results   Component Value Date    HGB 14.4 12/29/2022    MCV 93.0 12/29/2022     12/29/2022    ALT 22 09/14/2021    AST 27 09/14/2021      No results found.    REVIEW OF SYSTEMS  Review of Systems   Constitutional: Negative.    HENT: Negative.     Eyes: Negative.    Respiratory: Negative.     Cardiovascular: Negative.    Gastrointestinal:  Negative for abdominal pain, blood in stool, constipation, diarrhea, nausea and vomiting.        Stricture, Gastritis, Reflux with Saavedra's   Endocrine: Negative.    Genitourinary: Negative.    Musculoskeletal:  Positive for arthralgias and back pain.   Skin: Negative.    Allergic/Immunologic: Negative.    Neurological: Negative.    Hematological: Negative.    Psychiatric/Behavioral: Negative.         ACTIVE PROBLEMS  Patient Active Problem List     Diagnosis     History of esophageal stricture [Z87.19]     Saavedra's esophagus [K22.70]     Gastroesophageal reflux disease with esophagitis without hemorrhage [K21.00]     Esophageal dysphagia [R13.19]          PAST MEDICAL HISTORY  Past Medical History:   Diagnosis Date    Saavedra esophagus     GERD (gastroesophageal reflux disease)     Knee swelling 2020    Saw Dr. Rivas Patton for knee problem.    Lumbosacral disc disease 2015 herniated disk    Epdural helped. Take oxycodone for pain    Rotator cuff syndrome 2020    Was seeing [facility]ulay Patton ortho, have been taking Cortisone shots which help. PT. at [facility] for 6 months helped. Pain is returning.    Sleep apnea          SURGICAL HISTORY  Past Surgical History:   Procedure Laterality Date    ENDOSCOPY N/A 4/4/2023    Procedure: ESOPHAGOGASTRODUODENOSCOPY with 15-18mm Balloon;  Surgeon: Quincy Tong MD;  Location: [facility] [place] [facility] OR;  Service: Gastroenterology;  Laterality: N/A;  Ulcerative Esophagitis, duodenitis, Gastritis, Esophageal Stricture    LAPAROSCOPIC CHOLECYSTECTOMY           FAMILY HISTORY  Family History   Problem Relation Age of Onset    Diabetes Father     Heart disease Father          SOCIAL HISTORY  Social History     Occupational History    Not on file   Tobacco Use    Smoking status: Never     Passive exposure: Never    Smokeless tobacco: Never   Vaping Use    Vaping Use: Never used   Substance and Sexual Activity    Alcohol use: Yes     Alcohol/week: 1.0 standard drink     Types: 1 Shots of liquor per week     Comment: Occasional drink with dinner    Drug use: Never    Sexual activity: Yes     Partners: Female     Birth control/protection: Vasectomy         CURRENT MEDICATIONS    Current Outpatient Medications:     cyclobenzaprine (FLEXERIL) 5 MG tablet, Take 1 tablet by mouth 2 (Two) Times a Day As Needed., Disp: , Rfl:     fluticasone (FLONASE) 50 MCG/ACT nasal spray, 1 spray by Each Nare route Daily., Disp: , Rfl:     folic acid  "(FOLVITE) 1 MG tablet, Take 1 tablet by mouth Daily., Disp: , Rfl:     modafinil (PROVIGIL) 200 MG tablet, , Disp: , Rfl:     pantoprazole (PROTONIX) 40 MG EC tablet, Take 1 tablet by mouth Daily., Disp: 90 tablet, Rfl: 3    tamsulosin (FLOMAX) 0.4 MG capsule 24 hr capsule, Take 1 capsule by mouth Daily., Disp: , Rfl:     zolpidem (AMBIEN) 10 MG tablet, , Disp: , Rfl:     sucralfate (CARAFATE) 1 g tablet, Take 1 tablet by mouth., Disp: , Rfl:     ALLERGIES  Aspirin, Ibuprofen, and Nsaids    VITALS  Vitals:    07/24/23 1001   BP: (!) 154/104   BP Location: Left arm   Patient Position: Sitting   Cuff Size: Adult   Weight: 115 kg (253 lb)   Height: 182.9 cm (72.01\")       PHYSICAL EXAM  Debilities/Disabilities Identified: None  Emotional Behavior: Appropriate  Wt Readings from Last 3 Encounters:   07/24/23 115 kg (253 lb)   05/03/23 113 kg (250 lb)   04/14/23 113 kg (250 lb)     Ht Readings from Last 1 Encounters:   07/24/23 182.9 cm (72.01\")     Body mass index is 34.31 kg/m².  Physical Exam  Constitutional:       General: He is not in acute distress.     Appearance: Normal appearance. He is not ill-appearing.   HENT:      Head: Normocephalic and atraumatic.      Mouth/Throat:      Mouth: Mucous membranes are moist.      Pharynx: No posterior oropharyngeal erythema.   Eyes:      General: No scleral icterus.  Cardiovascular:      Rate and Rhythm: Normal rate and regular rhythm.      Heart sounds: Normal heart sounds.   Pulmonary:      Effort: Pulmonary effort is normal.      Breath sounds: Normal breath sounds.   Abdominal:      General: Abdomen is flat. Bowel sounds are normal. There is no distension.      Palpations: Abdomen is soft. There is no mass.      Tenderness: There is no abdominal tenderness. There is no guarding or rebound. Negative signs include Fu's sign.      Hernia: No hernia is present.   Musculoskeletal:      Cervical back: Neck supple.   Skin:     General: Skin is warm.      Capillary Refill: " Capillary refill takes less than 2 seconds.   Neurological:      General: No focal deficit present.      Mental Status: He is alert and oriented to person, place, and time.   Psychiatric:         Mood and Affect: Mood normal.         Behavior: Behavior normal.         Thought Content: Thought content normal.         Judgment: Judgment normal.       CLINICAL DATA REVIEWED   reviewed previous lab results and integrated with today's visit, reviewed notes from other physicians and/or last GI encounter, reviewed previous endoscopy results and available photos, reviewed surgical pathology results from previous biopsies    ASSESSMENT  Diagnoses and all orders for this visit:    Gastroesophageal reflux disease with esophagitis without hemorrhage    History of esophageal stricture    Saavedra's esophagus without dysplasia    Other orders  -     cyclobenzaprine (FLEXERIL) 5 MG tablet; Take 1 tablet by mouth 2 (Two) Times a Day As Needed.  -     tamsulosin (FLOMAX) 0.4 MG capsule 24 hr capsule; Take 1 capsule by mouth Daily.          PLAN    Continue daily PPI  EGD 4/2028    Return in about 6 months (around 1/24/2024).    I have discussed the above plan with the patient.  They verbalize understanding and are in agreement with the plan.  They have been advised to contact the office for any questions, concerns, or changes related to their health.

## 2023-07-31 ENCOUNTER — OFFICE VISIT (OUTPATIENT)
Dept: ORTHOPEDIC SURGERY | Facility: CLINIC | Age: 63
End: 2023-07-31
Payer: COMMERCIAL

## 2023-07-31 VITALS
SYSTOLIC BLOOD PRESSURE: 166 MMHG | HEIGHT: 72 IN | DIASTOLIC BLOOD PRESSURE: 100 MMHG | WEIGHT: 253 LBS | HEART RATE: 91 BPM | BODY MASS INDEX: 34.27 KG/M2

## 2023-07-31 DIAGNOSIS — M25.511 RIGHT SHOULDER PAIN, UNSPECIFIED CHRONICITY: ICD-10-CM

## 2023-07-31 DIAGNOSIS — M75.112 NONTRAUMATIC INCOMPLETE TEAR OF LEFT ROTATOR CUFF: Primary | ICD-10-CM

## 2023-07-31 DIAGNOSIS — R29.898 SHOULDER WEAKNESS: ICD-10-CM

## 2023-07-31 DIAGNOSIS — M75.22 BICEPS TENDINITIS OF LEFT UPPER EXTREMITY: ICD-10-CM

## 2023-07-31 DIAGNOSIS — M75.52 BURSITIS OF LEFT SHOULDER: ICD-10-CM

## 2023-07-31 DIAGNOSIS — M25.512 CHRONIC LEFT SHOULDER PAIN: ICD-10-CM

## 2023-07-31 DIAGNOSIS — G89.29 CHRONIC LEFT SHOULDER PAIN: ICD-10-CM

## 2023-07-31 RX ORDER — PREDNISONE 10 MG/1
TABLET ORAL
Qty: 39 TABLET | Refills: 0 | Status: SHIPPED | OUTPATIENT
Start: 2023-07-31

## 2023-07-31 RX ADMIN — LIDOCAINE HYDROCHLORIDE 4 ML: 10 INJECTION, SOLUTION INFILTRATION; PERINEURAL at 15:07

## 2023-07-31 RX ADMIN — TRIAMCINOLONE ACETONIDE 80 MG: 40 INJECTION, SUSPENSION INTRA-ARTICULAR; INTRAMUSCULAR at 15:07

## 2023-07-31 NOTE — PROGRESS NOTES
Subjective:     Patient ID: Sawyer Moraes is a 63 y.o. male.    Chief Complaint:  Bilateral shoulder pain, new patient to examiner  History of Present Illness  Sawyer Moraes presents to clinic today for evaluation of bilateral shoulder pain, right greater than left at this point in time. He rates the pain as moderate to severe in intensity bilaterally. This has been going on for the last 6 to 8 months. He denies any specific injury prior to the onset of his pain. He states that he feels like it is related to the fact that he has to do a lot of heavy lifting at work in awkward positions and has put a lot of stress on his shoulders. He did have an injection to his left shoulder back in 11/2022 that worked very well for him at that point in time. He also had a right subacromial injection back in 01/2023. These each worked for about 2 to 3 months at a time, but since then his pain has moderately recurred. He notes more weakness on his right. He does note some radiation down the pain down the lateral aspect of both arms, but not below the elbow. He denies any nikole numbness or tingling. He denies any fevers, chills, or sweats. Minimal improvement with home exercise including activity modification, anti-inflammatory medications, and a home strengthening program that he has done for greater than 6 weeks after evaluation by Dr. Gonzalez. He is right hand dominant.     The patient recently was discharged from rehab for opioid addiction.        Social History     Occupational History    Not on file   Tobacco Use    Smoking status: Never     Passive exposure: Never    Smokeless tobacco: Never   Vaping Use    Vaping Use: Never used   Substance and Sexual Activity    Alcohol use: Yes     Alcohol/week: 1.0 standard drink     Types: 1 Shots of liquor per week     Comment: Occasional drink with dinner    Drug use: Never    Sexual activity: Yes     Partners: Female     Birth control/protection: Vasectomy      Past Medical History:  "  Diagnosis Date    Saavedra esophagus     GERD (gastroesophageal reflux disease)     Knee swelling 2020    Saw Dr. Rivas Patton for knee problem.    Lumbosacral disc disease 2015 herniated disk    Epdural helped. Take oxycodone for pain    Rotator cuff syndrome 2020    Was seeing Zulay Patton ortho, have been taking Cortisone shots which help. PT. at [facility] for 6 months helped. Pain is returning.    Sleep apnea      Past Surgical History:   Procedure Laterality Date    ENDOSCOPY N/A 4/4/2023    Procedure: ESOPHAGOGASTRODUODENOSCOPY with 15-18mm Balloon;  Surgeon: Quincy Tong MD;  [place] [facility] [place];  Service: Gastroenterology;  Laterality: N/A;  Ulcerative Esophagitis, duodenitis, Gastritis, Esophageal Stricture    LAPAROSCOPIC CHOLECYSTECTOMY         Family History   Problem Relation Age of Onset    Diabetes Father     Heart disease Father          Review of Systems        Objective:  Vitals:    07/31/23 1425   BP: 166/100   Pulse: 91   Weight: 115 kg (253 lb)   Height: 182.9 cm (72\")         07/31/23  1425   Weight: 115 kg (253 lb)     Body mass index is 34.31 kg/mý.  Physical Exam    Vital signs reviewed.   General: No acute distress, alert and oriented  Eyes: conjunctiva clear; pupils equally round and reactive  ENT: external ears and nose atraumatic; oropharynx clear  CV: no peripheral edema  Resp: normal respiratory effort  Skin: no rashes or wounds; normal turgor  Psych: mood and affect appropriate; recent and remote memory intact        Ortho Exam       Bilateral shoulder-  Active Forward Flexion- 170 degrees  Strength- 4-/5 on the right, 4/5 on the left  Active External Rotation- 55 degrees  Strength- 4-/5 on the right, 4+/5 on the left  IR Strength- 4+/5  Bear hug sign- Negative  Empty can test- Positive  Drop arm test- Negative  Ext rotation lag sign- Negative  Neer's sign- Positive  Starr- Positive  Brisk cap refill to all digits, palpable 2+ radial pulse bilateral " wrists    Imaging:    MRI Shoulder Left Without Contrast    Result Date: 5/3/2023  Mild-to-moderate insertional infraspinatus tendinosis with small intrasubstance tear focus of high-grade tendinosis.  Mild insertional subscapularis and supraspinatus tendinosis.  Mild fatty atrophy of the teres minor possibly the sequela of prior trauma or neuritis.  Posterior inferior labral tear with small para labral cyst as detailed.  Biceps tendinosis.  This report was finalized on 5/3/2023 4:39 PM by Dr. JOSE Hunter MD.      Review of outside MRI include review of imaging as well as radiology report indicates rotator cuff tendinosis of subscapularis, supraspinatus, and infraspinatus with mild fatty atrophy of the teres and posterior inferior labral tear with small paralabral cyst.  Moderate biceps tendinosis appreciated.      Assessment:        1. Nontraumatic incomplete tear of left rotator cuff    2. Chronic left shoulder pain    3. Biceps tendinitis of left upper extremity    4. Bursitis of left shoulder    5. Right shoulder pain, unspecified chronicity    6. Shoulder weakness           Plan:  - Large Joint Arthrocentesis: L subacromial bursa on 7/31/2023 3:07 PM  Indications: pain  Details: 22 G needle, lateral approach  Medications: 80 mg triamcinolone acetonide 40 MG/ML; 4 mL lidocaine 1 %  Outcome: tolerated well, no immediate complications  Procedure, treatment alternatives, risks and benefits explained, specific risks discussed. Consent was given by the patient. Immediately prior to procedure a time out was called to verify the correct patient, procedure, equipment, support staff and site/side marked as required. Patient was prepped and draped in the usual sterile fashion.             1. Discussed treatment options at length with patient at today's visit.  2. At this point in time, given his increasing pain levels on the right without any advanced imaging, I would recommend we proceed with MRI at this point in  time to evaluate for rotator cuff pathology. He is in agreement with this, so an MRI was ordered today.  3. Patient would like to proceed with cortisone injection today to the left shoulder subacromial space. Recommended limited use of affected extremity for the next 24 hours to only essential activities other than work on general active and passive motion. Recommended supplementing with ice and soft tissue massage. Discussed with patient that they should see results in 5-7 days, if no improvement in 5-6 weeks I have asked them to call the office to review other options. Patient should call office immediately if they notice redness, warmth, fevers, chills, or residual numbness or tingling for greater than 6 hours after injection.  4. We will also prescribe a prednisone taper to cover his right shoulder as well as some right knee pain he is having at this point in time as well.   5. I will see him back once MRI is completed of the right shoulder and at that point in time, we will also evaluate the right knee as a new problem with x-rays needed of the right knee at that point.        Sawyer Moraes was in agreement with plan and had all questions answered.     Orders:  Orders Placed This Encounter   Procedures    - Large Joint Arthrocentesis: L subacromial bursa    MRI Shoulder Right Without Contrast       Medications:  New Medications Ordered This Visit   Medications    predniSONE (DELTASONE) 10 MG tablet     Si mg po daily x 3 days, then 40 mg po daily x 3 days, then 20 mg po daily x 3 days, then 10 mg po daily x 3 days     Dispense:  39 tablet     Refill:  0       Followup:  Return for review of MRI results.    Diagnoses and all orders for this visit:    1. Nontraumatic incomplete tear of left rotator cuff (Primary)    2. Chronic left shoulder pain    3. Biceps tendinitis of left upper extremity    4. Bursitis of left shoulder    5. Right shoulder pain, unspecified chronicity  -     MRI Shoulder Right Without  Contrast; Future    6. Shoulder weakness  -     MRI Shoulder Right Without Contrast; Future    Other orders  -     predniSONE (DELTASONE) 10 MG tablet; 60 mg po daily x 3 days, then 40 mg po daily x 3 days, then 20 mg po daily x 3 days, then 10 mg po daily x 3 days  Dispense: 39 tablet; Refill: 0  -     - Large Joint Arthrocentesis: L subacromial bursa          Dictated utilizing Dragon dictation     Transcribed from ambient dictation for Steve Altman MD by Nicole Celis.  07/31/23   20:29 EDT    Patient or patient representative verbalized consent to the visit recording.  I have personally performed the services described in this document as transcribed by the above individual, and it is both accurate and complete.

## 2023-08-07 RX ORDER — LIDOCAINE HYDROCHLORIDE 10 MG/ML
4 INJECTION, SOLUTION INFILTRATION; PERINEURAL
Status: COMPLETED | OUTPATIENT
Start: 2023-07-31 | End: 2023-07-31

## 2023-08-07 RX ORDER — TRIAMCINOLONE ACETONIDE 40 MG/ML
80 INJECTION, SUSPENSION INTRA-ARTICULAR; INTRAMUSCULAR
Status: COMPLETED | OUTPATIENT
Start: 2023-07-31 | End: 2023-07-31

## 2023-08-23 ENCOUNTER — HOSPITAL ENCOUNTER (OUTPATIENT)
Dept: MRI IMAGING | Facility: HOSPITAL | Age: 63
Discharge: HOME | End: 2023-08-23
Admitting: ORTHOPAEDIC SURGERY
Payer: COMMERCIAL

## 2023-08-23 DIAGNOSIS — M25.511 RIGHT SHOULDER PAIN, UNSPECIFIED CHRONICITY: ICD-10-CM

## 2023-08-23 DIAGNOSIS — R29.898 SHOULDER WEAKNESS: ICD-10-CM

## 2023-08-23 PROCEDURE — 73221 MRI JOINT UPR EXTREM W/O DYE: CPT

## 2023-08-28 ENCOUNTER — PRE-ADMISSION TESTING (OUTPATIENT)
Dept: PREADMISSION TESTING | Facility: HOSPITAL | Age: 63
End: 2023-08-28
Payer: COMMERCIAL

## 2023-08-28 VITALS
OXYGEN SATURATION: 97 % | DIASTOLIC BLOOD PRESSURE: 82 MMHG | WEIGHT: 246.58 LBS | SYSTOLIC BLOOD PRESSURE: 151 MMHG | BODY MASS INDEX: 33.4 KG/M2 | HEART RATE: 72 BPM | RESPIRATION RATE: 16 BRPM | HEIGHT: 72 IN

## 2023-08-28 LAB
ANION GAP SERPL CALCULATED.3IONS-SCNC: 12.6 MMOL/L (ref 5–15)
BUN SERPL-MCNC: 12 MG/DL (ref 8–23)
BUN/CREAT SERPL: 9.9 (ref 7–25)
CALCIUM SPEC-SCNC: 9.5 MG/DL (ref 8.6–10.5)
CHLORIDE SERPL-SCNC: 100 MMOL/L (ref 98–107)
CO2 SERPL-SCNC: 23.4 MMOL/L (ref 22–29)
CREAT SERPL-MCNC: 1.21 MG/DL (ref 0.76–1.27)
DEPRECATED RDW RBC AUTO: 47 FL (ref 37–54)
EGFRCR SERPLBLD CKD-EPI 2021: 67.7 ML/MIN/1.73
ERYTHROCYTE [DISTWIDTH] IN BLOOD BY AUTOMATED COUNT: 14 % (ref 12.3–15.4)
GLUCOSE SERPL-MCNC: 141 MG/DL (ref 65–99)
HCT VFR BLD AUTO: 49.6 % (ref 37.5–51)
HGB BLD-MCNC: 15.9 G/DL (ref 13–17.7)
MCH RBC QN AUTO: 29.6 PG (ref 26.6–33)
MCHC RBC AUTO-ENTMCNC: 32.1 G/DL (ref 31.5–35.7)
MCV RBC AUTO: 92.2 FL (ref 79–97)
PLATELET # BLD AUTO: 182 10*3/MM3 (ref 140–450)
PMV BLD AUTO: 9.7 FL (ref 6–12)
POTASSIUM SERPL-SCNC: 3.9 MMOL/L (ref 3.5–5.2)
QT INTERVAL: 406 MS
QTC INTERVAL: 393 MS
RBC # BLD AUTO: 5.38 10*6/MM3 (ref 4.14–5.8)
SODIUM SERPL-SCNC: 136 MMOL/L (ref 136–145)
WBC NRBC COR # BLD: 5.93 10*3/MM3 (ref 3.4–10.8)

## 2023-08-28 PROCEDURE — 36415 COLL VENOUS BLD VENIPUNCTURE: CPT

## 2023-08-28 PROCEDURE — 80048 BASIC METABOLIC PNL TOTAL CA: CPT | Performed by: UROLOGY

## 2023-08-28 PROCEDURE — 85027 COMPLETE CBC AUTOMATED: CPT | Performed by: UROLOGY

## 2023-08-28 PROCEDURE — 93005 ELECTROCARDIOGRAM TRACING: CPT

## 2023-08-28 NOTE — DISCHARGE INSTRUCTIONS
PRE-ADMISSION TESTING INSTRUCTIONS FOR ADULTS    Take these medications the morning of surgery with a small sip of water: Metoprolol, Pantoprazole, Sucralfate      Do not take any insulin or diabetes medications the morning of surgery.      No aspirin, advil, aleve, ibuprofen, naproxen, diet pills, decongestants, or herbal/vitamins for a week prior to surgery.       Tylenol/Acetaminophen is okay to take if needed.    General Instructions:    DO NOT EAT SOLID FOOD AFTER MIDNIGHT THE NIGHT BEFORE SURGERY. No gum, mints, or hard candy after midnight the night before surgery.  You may drink clear liquids the day of surgery up until 2 hours before your arrival time.  Clear liquids are liquids you can see through. Nothing RED in color.    Plain water    Sports drinks      Gelatin (Jell-O)  Fruit juices without pulp such as white grape juice and apple juice  Popsicles that contain no fruit or yogurt  Tea or coffee (no cream or milk added)    It is beneficial for you to have a clear drink that contains carbohydrates 2 hours before your arrival time.  We suggest a 20 ounce bottle of Gatorade or Powerade for non-diabetic patients or a 20 ounce bottle of Gatorade Zero or Powerade Zero for diabetic patients.     Patients who avoid smoking, chewing tobacco and alcohol for 4 weeks prior to surgery have a reduced risk of post-operative complications.  If at all possible, quit smoking as many days before surgery as you can.    Do not smoke, use chewing tobacco or drink alcohol the day of surgery    Bring your C-PAP/ BI-PAP machine if you use one.  Wear clean comfortable clothes.  Do not wear contact lenses, lotion, deodorant, or make-up.  Bring a case for your glasses if applicable. You may brush your teeth the morning of surgery.  You may wear dentures/partials, do not put adhesive/glue on them.  Leave all other jewelry and valuables at home.      Preventing a Surgical Site Infection:    Shower the night before and on the morning  of surgery using the chlorhexidine soap you were given.  Use a clean washcloth with the soap.  Place clean sheets on your bed after showering the night before surgery. Do not use the CHG soap on your hair, face, or private areas. Wash your body gently for five (5) minutes. Do not scrub your skin.  Dry with a clean towel and dress in clean clothing.  Do not shave the surgical area for 10 days-2 weeks prior to surgery  because the razor can irritate skin and make it easier to develop an infection.  Make sure you, your family, and all healthcare providers clean their hands with soap and water or an alcohol based hand  before caring for you or your wound.      Day of surgery:    Your surgeon's office will advise you of your arrival time for the day of surgery.    Upon arrival, a Pre-op nurse and Anesthesia provider will review your health history, obtain vital signs, and answer questions you may have. The anesthesia provider will also discuss the type of anesthesia that will be needed for your procedure, which may include general anesthesia. The only belongings needed at this time will be your home medications and if applicable your C-PAP/BI-PAP machine.  If you are staying overnight your family can leave the rest of your belongings in the car and bring them to your room later.  A Pre-op nurse will start an IV and you may receive medication in preparation for surgery, including something to help you relax.  Your family will be able to see you in the Pre-op area.  While you are in surgery your family should notify the waiting room  if they leave the waiting room area and provide a contact phone number.    IF you have any questions, you can call the Pre-Admission Department at [phone] or your surgeon's office.  Notify your surgeon if  you become sick, have a fever, productive cough, or cannot be here the day of surgery    Please be aware that surgery does come with discomfort.  We want to make  every effort to control your discomfort so please discuss any uncontrolled symptoms with your nurse.   Your doctor will most likely have prescribed pain medications.      If you are going home after surgery, you will receive individualized written care instructions before being discharged.  A responsible adult (over the age of 18) must drive you to and from the hospital on the day of your surgery and stay with you for 24 hours after anesthesia.    If you are staying overnight following surgery, you will be transported to your hospital room following the recovery period.  [facility] has all private rooms.    You may receive a survey regarding the care you received. Your feedback is very important and will be used to collect the necessary data to help us to continue to provide excellent care.     Deductibles and co-payments are collected on the day of service. Please be prepared to pay the required co-pay, deductible or deposit on the day of service as defined by your plan.

## 2023-08-28 NOTE — PAT
Pt here for PAT visit.  Pre-op tests completed, Dial soap instructed for bathing the night before and morning of surgery and instructions reviewed.  Instructed clears until 2 hrs prior to arrival time, voiced understanding.

## 2023-08-31 ENCOUNTER — APPOINTMENT (OUTPATIENT)
Dept: GENERAL RADIOLOGY | Facility: HOSPITAL | Age: 63
End: 2023-08-31
Payer: COMMERCIAL

## 2023-08-31 ENCOUNTER — HOSPITAL ENCOUNTER (EMERGENCY)
Facility: HOSPITAL | Age: 63
Discharge: HOME | End: 2023-08-31
Attending: EMERGENCY MEDICINE
Payer: COMMERCIAL

## 2023-08-31 VITALS
RESPIRATION RATE: 18 BRPM | OXYGEN SATURATION: 98 % | SYSTOLIC BLOOD PRESSURE: 173 MMHG | HEART RATE: 90 BPM | BODY MASS INDEX: 33.86 KG/M2 | DIASTOLIC BLOOD PRESSURE: 105 MMHG | WEIGHT: 250 LBS | HEIGHT: 72 IN | TEMPERATURE: 98.4 F

## 2023-08-31 DIAGNOSIS — S83.91XA SPRAIN OF RIGHT KNEE, UNSPECIFIED LIGAMENT, INITIAL ENCOUNTER: Primary | ICD-10-CM

## 2023-08-31 PROCEDURE — 73562 X-RAY EXAM OF KNEE 3: CPT

## 2023-08-31 PROCEDURE — 99283 EMERGENCY DEPT VISIT LOW MDM: CPT

## 2023-08-31 NOTE — Clinical Note
KARLEE LEYAV  [facility] EMERGENCY DEPARTMENT  [address] [place] [address]  Phone: [phone]    Sawyer Moraes was seen and treated in our emergency department on 8/31/2023.  He may return to work on 09/03/2023.         Thank you for choosing [facility].    John Landry MD

## 2023-08-31 NOTE — ED PROVIDER NOTES
Subjective   History of Present Illness  History of Present Illness    Chief complaint: Knee pain    Location: Right knee    Quality/Severity: Moderate, sharp    Timing/Onset/Duration: 45 minutes prior to arrival    Modifying Factors: Hurts to bear weight    Associated Symptoms: No numbness, tingling, or weakness.  No change in color or temperature.    Narrative: This 63-year-old white male was climbing some steps and felt a pop in his right knee and presents complaining of right knee pain.    PCP:Gayathri Huggins MD   Review of Systems   Musculoskeletal:         Right knee pain   Skin:  Negative for wound.   Neurological:  Negative for weakness and numbness.       Past Medical History:   Diagnosis Date   • Asthma     about 6 months ago during covid had issues does not use inhaler   • Saavedra esophagus    • Cancer     skin cancers to back   • Diarrhea    • GERD (gastroesophageal reflux disease)    • Hypertension    • Kidney trouble     only has one kidney   • Knee swelling 2020    Saw Dr. Rivas Patton for knee problem.   • Lumbosacral disc disease 2015 herniated disk    Epdural helped. Take oxycodone for pain   • Rotator cuff syndrome 2020    Was seeing Zulay prakash, have been taking Cortisone shots which help. [facility]. Pain is returning.   • Sleep apnea     uses CPAP   • Urgency of urination     frequency       Allergies   Allergen Reactions   • Aspirin Myalgia, Other (See Comments), Unknown (See Comments) and Unknown - High Severity   • Ibuprofen Other (See Comments)     1 kidney told not to take   1 kidney told not to take      • Nsaids Other (See Comments)       Past Surgical History:   Procedure Laterality Date   • COLONOSCOPY     • ENDOSCOPY N/A 04/04/2023    Procedure: ESOPHAGOGASTRODUODENOSCOPY with 15-18mm Balloon;  Surgeon: Quincy Tong MD;  Location: [facility] [place] MAIN OR;  Service: Gastroenterology;  Laterality: N/A;  Ulcerative Esophagitis, duodenitis,  Gastritis, Esophageal Stricture   • LAPAROSCOPIC CHOLECYSTECTOMY         Family History   Problem Relation Age of Onset   • Diabetes Father    • Heart disease Father        Social History     Socioeconomic History   • Marital status:    Tobacco Use   • Smoking status: Never     Passive exposure: Never   • Smokeless tobacco: Never   Vaping Use   • Vaping Use: Never used   Substance and Sexual Activity   • Alcohol use: Never     Alcohol/week: 1.0 standard drink     Types: 1 Shots of liquor per week   • Drug use: Never   • Sexual activity: Yes     Partners: Female     Birth control/protection: Vasectomy           Objective   Physical Exam  Vitals reviewed: Temperature is 98.4 °F, pulse 90, respirations 18, /105, room air pulse ox 98%..   Constitutional:       Appearance: Normal appearance.   Musculoskeletal:      Comments: There is tenderness upon palpation of the right patellar reflex and tibial plateau.  The capillary refill is less than 2 seconds.  The sensation is intact.  There is a normal range of motion noted.  There is no joint laxity noted.  There is a 2+ dorsalis pedis pulse.  There is no joint laxity noted.   Skin:     General: Skin is warm and dry.      Findings: No rash.   Neurological:      Mental Status: He is alert.      Sensory: No sensory deficit.      Motor: No weakness.       Procedures           ED Course      20:58 EDT, 08/31/23: The patient's diagnosis of right knee sprain was discussed with him.  The patient should ice the right knee for 20 minutes every 2-3 hours while he is awake for 2 to 3 days.  The patient should take Tylenol as needed as directed for pain.  Patient should not bear weight on the right knee for 2 days, then advance weightbearing as tolerated.  The patient should call Dr. Gonzalez in the morning for a follow-up appointment within 1 week.  The patient should return to the emergency department if there is increased pain, numbness, tingling, weakness, change in color  or temperature of the right lower extremity, worse in any way at all.  All the patient questions were answered he will be discharged in good condition.                                       Medical Decision Making  Amount and/or Complexity of Data Reviewed  Radiology: ordered.        Final diagnoses:   Sprain of right knee, unspecified ligament, initial encounter       ED Disposition  ED Disposition       None            No follow-up provider specified.       Medication List      No changes were made to your prescriptions during this visit.            John Landry MD  08/31/23 [id number]

## 2023-09-01 NOTE — DISCHARGE INSTRUCTIONS
Ice the right knee for 20 minutes every 2-3 hours while you are awake for 3 days.  Do not bear weight on the right knee for 2 days, then advance as tolerated.  Call Dr. Gonzalez in the morning for follow-up appointment within 1 week.  Return to the emergency department there is increased pain, numbness, tingling, weakness, change in color or temperature of the right lower extremity, worse in any way at all.

## 2023-09-08 ENCOUNTER — ANESTHESIA EVENT (OUTPATIENT)
Dept: PERIOP | Facility: HOSPITAL | Age: 63
End: 2023-09-08
Payer: COMMERCIAL

## 2023-09-11 ENCOUNTER — HOSPITAL ENCOUNTER (OUTPATIENT)
Facility: HOSPITAL | Age: 63
Setting detail: HOSPITAL OUTPATIENT SURGERY
Discharge: HOME | End: 2023-09-11
Attending: UROLOGY | Admitting: UROLOGY
Payer: COMMERCIAL

## 2023-09-11 ENCOUNTER — ANESTHESIA (OUTPATIENT)
Dept: PERIOP | Facility: HOSPITAL | Age: 63
End: 2023-09-11
Payer: COMMERCIAL

## 2023-09-11 ENCOUNTER — APPOINTMENT (OUTPATIENT)
Dept: GENERAL RADIOLOGY | Facility: HOSPITAL | Age: 63
End: 2023-09-11
Payer: COMMERCIAL

## 2023-09-11 VITALS
WEIGHT: 244.6 LBS | RESPIRATION RATE: 16 BRPM | BODY MASS INDEX: 33.17 KG/M2 | DIASTOLIC BLOOD PRESSURE: 97 MMHG | HEART RATE: 74 BPM | OXYGEN SATURATION: 99 % | SYSTOLIC BLOOD PRESSURE: 146 MMHG | TEMPERATURE: 97.5 F

## 2023-09-11 PROBLEM — R31.0 GROSS HEMATURIA: Status: ACTIVE | Noted: 2023-09-11

## 2023-09-11 PROBLEM — R93.429 ABNORMAL CT SCAN, KIDNEY: Status: ACTIVE | Noted: 2023-09-11

## 2023-09-11 PROCEDURE — 25010000002 ONDANSETRON PER 1 MG: Performed by: NURSE ANESTHETIST, CERTIFIED REGISTERED

## 2023-09-11 PROCEDURE — 25010000002 MIDAZOLAM PER 1MG: Performed by: NURSE ANESTHETIST, CERTIFIED REGISTERED

## 2023-09-11 PROCEDURE — C1758 CATHETER, URETERAL: HCPCS | Performed by: UROLOGY

## 2023-09-11 PROCEDURE — C1769 GUIDE WIRE: HCPCS | Performed by: UROLOGY

## 2023-09-11 PROCEDURE — 74420 UROGRAPHY RTRGR +-KUB: CPT

## 2023-09-11 PROCEDURE — 25010000002 DEXAMETHASONE PER 1 MG: Performed by: NURSE ANESTHETIST, CERTIFIED REGISTERED

## 2023-09-11 PROCEDURE — 25510000001 IOPAMIDOL 61 % SOLUTION: Performed by: UROLOGY

## 2023-09-11 PROCEDURE — 0 CEFAZOLIN SODIUM-DEXTROSE 2-3 GM-%(50ML) RECONSTITUTED SOLUTION: Performed by: UROLOGY

## 2023-09-11 PROCEDURE — 25010000002 PROPOFOL 200 MG/20ML EMULSION: Performed by: NURSE ANESTHETIST, CERTIFIED REGISTERED

## 2023-09-11 RX ORDER — ONDANSETRON 2 MG/ML
4 INJECTION INTRAMUSCULAR; INTRAVENOUS ONCE AS NEEDED
Status: DISCONTINUED | OUTPATIENT
Start: 2023-09-11 | End: 2023-09-11 | Stop reason: HOSPADM

## 2023-09-11 RX ORDER — PROPOFOL 10 MG/ML
INJECTION, EMULSION INTRAVENOUS AS NEEDED
Status: DISCONTINUED | OUTPATIENT
Start: 2023-09-11 | End: 2023-09-11 | Stop reason: SURG

## 2023-09-11 RX ORDER — MIDAZOLAM HYDROCHLORIDE 2 MG/2ML
2 INJECTION, SOLUTION INTRAMUSCULAR; INTRAVENOUS ONCE
Status: COMPLETED | OUTPATIENT
Start: 2023-09-11 | End: 2023-09-11

## 2023-09-11 RX ORDER — ONDANSETRON 2 MG/ML
4 INJECTION INTRAMUSCULAR; INTRAVENOUS ONCE AS NEEDED
Status: COMPLETED | OUTPATIENT
Start: 2023-09-11 | End: 2023-09-11

## 2023-09-11 RX ORDER — SODIUM CHLORIDE, SODIUM LACTATE, POTASSIUM CHLORIDE, CALCIUM CHLORIDE 600; 310; 30; 20 MG/100ML; MG/100ML; MG/100ML; MG/100ML
9 INJECTION, SOLUTION INTRAVENOUS CONTINUOUS PRN
Status: DISCONTINUED | OUTPATIENT
Start: 2023-09-11 | End: 2023-09-11 | Stop reason: HOSPADM

## 2023-09-11 RX ORDER — SODIUM CHLORIDE 0.9 % (FLUSH) 0.9 %
10 SYRINGE (ML) INJECTION EVERY 12 HOURS SCHEDULED
Status: DISCONTINUED | OUTPATIENT
Start: 2023-09-11 | End: 2023-09-11 | Stop reason: HOSPADM

## 2023-09-11 RX ORDER — FAMOTIDINE 10 MG/ML
20 INJECTION, SOLUTION INTRAVENOUS
Status: COMPLETED | OUTPATIENT
Start: 2023-09-11 | End: 2023-09-11

## 2023-09-11 RX ORDER — SODIUM CHLORIDE 9 MG/ML
40 INJECTION, SOLUTION INTRAVENOUS AS NEEDED
Status: DISCONTINUED | OUTPATIENT
Start: 2023-09-11 | End: 2023-09-11 | Stop reason: HOSPADM

## 2023-09-11 RX ORDER — BUPIVACAINE HCL/0.9 % NACL/PF 0.1 %
2000 PLASTIC BAG, INJECTION (ML) EPIDURAL ONCE
Status: DISCONTINUED | OUTPATIENT
Start: 2023-09-11 | End: 2023-09-11 | Stop reason: CLARIF

## 2023-09-11 RX ORDER — DEXAMETHASONE SODIUM PHOSPHATE 4 MG/ML
4 INJECTION, SOLUTION INTRA-ARTICULAR; INTRALESIONAL; INTRAMUSCULAR; INTRAVENOUS; SOFT TISSUE ONCE AS NEEDED
Status: COMPLETED | OUTPATIENT
Start: 2023-09-11 | End: 2023-09-11

## 2023-09-11 RX ORDER — MAGNESIUM HYDROXIDE 1200 MG/15ML
LIQUID ORAL AS NEEDED
Status: DISCONTINUED | OUTPATIENT
Start: 2023-09-11 | End: 2023-09-11 | Stop reason: HOSPADM

## 2023-09-11 RX ORDER — PHENAZOPYRIDINE HYDROCHLORIDE 100 MG/1
100 TABLET, FILM COATED ORAL
Status: DISCONTINUED | OUTPATIENT
Start: 2023-09-11 | End: 2023-09-11 | Stop reason: HOSPADM

## 2023-09-11 RX ORDER — CEFAZOLIN SODIUM 2 G/50ML
2000 SOLUTION INTRAVENOUS ONCE
Status: COMPLETED | OUTPATIENT
Start: 2023-09-11 | End: 2023-09-11

## 2023-09-11 RX ORDER — DEXMEDETOMIDINE HYDROCHLORIDE 100 UG/ML
INJECTION, SOLUTION INTRAVENOUS AS NEEDED
Status: DISCONTINUED | OUTPATIENT
Start: 2023-09-11 | End: 2023-09-11 | Stop reason: SURG

## 2023-09-11 RX ORDER — FENTANYL CITRATE 50 UG/ML
50 INJECTION, SOLUTION INTRAMUSCULAR; INTRAVENOUS
Status: DISCONTINUED | OUTPATIENT
Start: 2023-09-11 | End: 2023-09-11 | Stop reason: HOSPADM

## 2023-09-11 RX ORDER — LIDOCAINE HYDROCHLORIDE 20 MG/ML
INJECTION, SOLUTION INFILTRATION; PERINEURAL AS NEEDED
Status: DISCONTINUED | OUTPATIENT
Start: 2023-09-11 | End: 2023-09-11 | Stop reason: SURG

## 2023-09-11 RX ORDER — SODIUM CHLORIDE 0.9 % (FLUSH) 0.9 %
10 SYRINGE (ML) INJECTION AS NEEDED
Status: DISCONTINUED | OUTPATIENT
Start: 2023-09-11 | End: 2023-09-11 | Stop reason: HOSPADM

## 2023-09-11 RX ADMIN — DEXAMETHASONE SODIUM PHOSPHATE 4 MG: 4 INJECTION, SOLUTION INTRAMUSCULAR; INTRAVENOUS at 11:34

## 2023-09-11 RX ADMIN — PROPOFOL INJECTABLE EMULSION 200 MG: 10 INJECTION, EMULSION INTRAVENOUS at 13:16

## 2023-09-11 RX ADMIN — DEXMEDETOMIDINE 20 MCG: 100 INJECTION, SOLUTION, CONCENTRATE INTRAVENOUS at 13:16

## 2023-09-11 RX ADMIN — MIDAZOLAM HYDROCHLORIDE 2 MG: 1 INJECTION, SOLUTION INTRAMUSCULAR; INTRAVENOUS at 13:08

## 2023-09-11 RX ADMIN — SODIUM CHLORIDE, POTASSIUM CHLORIDE, SODIUM LACTATE AND CALCIUM CHLORIDE 9 ML/HR: 600; 310; 30; 20 INJECTION, SOLUTION INTRAVENOUS at 11:21

## 2023-09-11 RX ADMIN — LIDOCAINE HYDROCHLORIDE 100 MG: 20 INJECTION, SOLUTION INFILTRATION; PERINEURAL at 13:16

## 2023-09-11 RX ADMIN — PHENAZOPYRIDINE 100 MG: 100 TABLET ORAL at 14:51

## 2023-09-11 RX ADMIN — CEFAZOLIN SODIUM 2000 MG: 2 SOLUTION INTRAVENOUS at 13:20

## 2023-09-11 RX ADMIN — FAMOTIDINE 20 MG: 10 INJECTION, SOLUTION INTRAVENOUS at 11:34

## 2023-09-11 RX ADMIN — ONDANSETRON 4 MG: 2 INJECTION INTRAMUSCULAR; INTRAVENOUS at 11:34

## 2023-09-11 NOTE — BRIEF OP NOTE
CYSTOSCOPY RETROGRADE PYELOGRAM  Progress Note    Sawyer Tors  9/11/2023    Pre-op Diagnosis:   R31.9 Abnormal CT scan       Post-Op Diagnosis Codes:  Same    Procedure/CPT® Codes:        Procedure(s):  CYSTOSCOPY RIGHT RETROGRADE PYELOGRAM  Attempted left            Surgeon(s):  Vince Hughes MD    Anesthesia: General    Staff:   * No surgical staff found *         Estimated Blood Loss: none    Urine Voided: * No values recorded between 9/11/2023 12:00 AM and 9/11/2023 12:53 PM *    Specimens:                None          Drains: * No LDAs found *    Findings: Left renal hypoplasia no masses appreciable in the bladder the left ureteral orifice poorly developed could not cannulate even with the smallest of guidewires        Complications:           Vince Hughes MD     Date: 9/11/2023  Time: 12:53 EDT

## 2023-09-11 NOTE — INTERVAL H&P NOTE
H&P reviewed. The patient was examined and there are no changes to the H&P.      /98 (BP Location: Left arm, Patient Position: Lying)   Pulse 70   Temp 98.1 °F (36.7 °C) (Oral)   Resp 17   Wt 111 kg (244 lb 9.6 oz)   SpO2 96%   BMI 33.17 kg/m²

## 2023-09-11 NOTE — ANESTHESIA POSTPROCEDURE EVALUATION
Patient: Sawyer Moraes    Procedure Summary       Date: 09/11/23 Room / Location:  [facility] OR 4 / [facility]    Anesthesia Start: 1311 Anesthesia Stop: 1354    Procedure: CYSTOSCOPY BILATERAL  RETROGRADES WITH PYELOGRAM (Right: Ureter) Diagnosis: (R31.9)    Surgeons: Vince Hughes MD Provider: Sb Faye CRNA    Anesthesia Type: general ASA Status: 2            Anesthesia Type: general    Vitals  Vitals Value Taken Time   /87 09/11/23 1427   Temp 97.5 °F (36.4 °C) 09/11/23 1408   Pulse 75 09/11/23 1430   Resp 18 09/11/23 1425   SpO2 97 % 09/11/23 1430   Vitals shown include unvalidated device data.        Post Anesthesia Care and Evaluation    Patient location during evaluation: bedside  Patient participation: complete - patient participated  Level of consciousness: awake and alert  Pain score: 0  Pain management: adequate    Airway patency: patent  Anesthetic complications: No anesthetic complications  PONV Status: none  Cardiovascular status: acceptable  Respiratory status: acceptable  Hydration status: acceptable  No anesthesia care post op

## 2023-09-11 NOTE — ANESTHESIA PROCEDURE NOTES
Airway  Urgency: elective    Date/Time: 9/11/2023 1:16 PM  Airway not difficult    General Information and Staff    Patient location during procedure: OR  CRNA/CAA: Sb Faye CRNA    Indications and Patient Condition  Indications for airway management: airway protection    Preoxygenated: yes  Mask difficulty assessment: 0 - not attempted    Final Airway Details  Final airway type: supraglottic airway      Successful airway: unique  Size 4     Number of attempts at approach: 1  Assessment: lips, teeth, and gum same as pre-op

## 2023-09-11 NOTE — H&P
First Urology History and Physical    Patient Care Team:  Gayathri Huggins MD as PCP - General (Internal Medicine)    Chief complaint blood my urine    Subjective     Patient is a 63 y.o. male presents with history of being down to [facility] development of hematuria with evaluation revealing left renal hypoplasia and abnormal thickening of the ureter the patient is ago further evaluation  Underlying BPH-bear on Flomax with improvement.  Urgency and frequency      Review of Systems   No fever or chills    Past Medical History:   Diagnosis Date    Asthma     about 6 months ago during covid had issues does not use inhaler    Saavedra esophagus     Cancer     skin cancers to back    Diarrhea     GERD (gastroesophageal reflux disease)     Hypertension     Kidney trouble     only has one kidney    Knee swelling 2020    Saw Dr. Rivas Patton for knee problem.    Lumbosacral disc disease 2015 herniated disk    Epdural helped. Take oxycodone for pain    Rotator cuff syndrome 2020    Was seeing Zulay prakash, have been taking Cortisone shots which help. PT. at [facility] for 6 months helped. Pain is returning.    Sleep apnea     uses CPAP    Urgency of urination     frequency     Past Surgical History:   Procedure Laterality Date    COLONOSCOPY      ENDOSCOPY N/A 04/04/2023    Procedure: ESOPHAGOGASTRODUODENOSCOPY with 15-18mm Balloon;  Surgeon: Quincy Tong MD;  Location: [facility] [place] [facility] [place];  Service: Gastroenterology;  Laterality: N/A;  Ulcerative Esophagitis, duodenitis, Gastritis, Esophageal Stricture    LAPAROSCOPIC CHOLECYSTECTOMY       Family History   Problem Relation Age of Onset    Diabetes Father     Heart disease Father      Social History     Tobacco Use    Smoking status: Never     Passive exposure: Never    Smokeless tobacco: Never   Vaping Use    Vaping Use: Never used   Substance Use Topics    Alcohol use: Never     Alcohol/week: 1.0 standard drink     Types: 1 Shots of  liquor per week    Drug use: Never       Meds:  No medications prior to admission.       Allergies:  Aspirin, Ibuprofen, and Nsaids    Debilities:  None    Objective     Vital Signs     No intake or output data in the 24 hours ending 09/11/23 0737       Physical Exam:      General Appearance:    Alert, cooperative, in no acute distress   Head:    Normocephalic, without obvious abnormality, atraumatic   Eyes:            Lids and lashes normal, conjunctivae and sclerae normal, no   icterus, no pallor, corneas clear   Ears:    Ears appear intact with no abnormalities noted   Throat:   No oral lesions, no thrush, oral mucosa moist   Neck:   No adenopathy, supple, trachea midline, no thyromegaly no JVD   Back:     No kyphosis present, no scoliosis present, no skin lesions,      erythema or scars, no tenderness to percussion or                   palpation,   range of motion normal   Lungs:   respirations regular, even and                  unlabored    Heart:    Regular rhythm and normal rate   Chest Wall:    No abnormalities observed   Abdomen:   no masses, no organomegaly, soft        non-tender, non-distended, no guarding, no rebound                tenderness   Rectal:   No rectal masses prostates palpably enlarged and nontender   Extremities:    Pulses:    Skin:    Lymph nodes:    Neurologic:      Results Review:    I reviewed the patient's new clinical results.  Results for orders placed or performed in visit on 08/28/23   Basic Metabolic Panel    Specimen: Blood   Result Value Ref Range    Glucose 141 (H) 65 - 99 mg/dL    BUN 12 8 - 23 mg/dL    Creatinine 1.21 0.76 - 1.27 mg/dL    Sodium 136 136 - 145 mmol/L    Potassium 3.9 3.5 - 5.2 mmol/L    Chloride 100 98 - 107 mmol/L    CO2 23.4 22.0 - 29.0 mmol/L    Calcium 9.5 8.6 - 10.5 mg/dL    BUN/Creatinine Ratio 9.9 7.0 - 25.0    Anion Gap 12.6 5.0 - 15.0 mmol/L    eGFR 67.7 >60.0 mL/min/1.73   CBC (No Diff)    Specimen: Blood   Result Value Ref Range    WBC 5.93 3.40 -  10.80 10*3/mm3    RBC 5.38 4.14 - 5.80 10*6/mm3    Hemoglobin 15.9 13.0 - 17.7 g/dL    Hematocrit 49.6 37.5 - 51.0 %    MCV 92.2 79.0 - 97.0 fL    MCH 29.6 26.6 - 33.0 pg    MCHC 32.1 31.5 - 35.7 g/dL    RDW 14.0 12.3 - 15.4 %    RDW-SD 47.0 37.0 - 54.0 fl    MPV 9.7 6.0 - 12.0 fL    Platelets 182 140 - 450 10*3/mm3   ECG 12 Lead   Result Value Ref Range    QT Interval 406 ms    QTC Interval 393 ms        Assessment:  Hematuria abnormal CT scan with ureteral thickening renal hypoplasia    Plan:    Cystoscopy retrograde pyelograms urine cytology possible biopsy ureteroscopy R-B-O discussed with patient and to infection bleeding use of a stent normal exam ancillary procedures he understands agrees to proceed    I discussed the patient's findings and my recommendations with patient.     Vince Hughes MD  09/11/23  07:37 EDT

## 2023-09-11 NOTE — OP NOTE
Preop diagnosis hematuria abnormal CT scan hypoplastic left kidney with abnormal distal left ureter    Postoperative diagnosis normal cystourethroscopy and right retrograde pyelogram mild prostatic obstruction with about a 4.5 cm channel could not cannulate the poorly developed left ureteral orifice using multiple guide-glide wires    Operative procedure cystoscopy right retrograde pyelogram    Surgeon Saul    Anesthesia General    Procedure note Lizette 63-year-old male with above-mentioned history and findings to undergo further evaluation Timeout was taken COVID precautions allergies reviewed antibiotics were given general anesthesia placed very carefully modified dorsolithotomy position all pressure points relieved prepped and draped sterile fashion of genitalia 2% intraurethral lidocaine jelly administered scope was advanced into the bladder urethra was normal prostates about 4 and half centimeters in length with mildly obstructing bilateral lobes mildly high riding bladder neck trigone right ureteral orifice with clear E flux the the appearance of the left hemitrigone without E flux    The bladder showed very mild trabeculations no cellules diverticula bladder tumors preliminary films reveal no urinary tract calcifications right retrograde pyelogram showed no filling defects or hydronephrosis    I could not cannulate the left ureteral orifice with any shape or style of guide-glide wires the procedure was terminated after multiple attempts the patient procedure well and was sent to the recovery room in satisfactory condition    Disposition findings will be discussed with his wife with anticipation to repeat the CT scan in 3 months as per CT recommendations if retrogrades could not be done the left side continuation of his home meds including PDE 5 inhibitor and tamsulosin regular diet and activities with instruction sheet given phone numbers to call if there is any concerns or questions my office will call  for follow-up

## 2023-09-11 NOTE — ANESTHESIA PREPROCEDURE EVALUATION
Anesthesia Evaluation     Patient summary reviewed and Nursing notes reviewed   no history of anesthetic complications:   NPO Solid Status: > 8 hours  NPO Liquid Status: > 2 hours           Airway   Mallampati: III  TM distance: >3 FB  Neck ROM: full  Possible difficult intubation and Large neck circumference  Dental - normal exam     Pulmonary - normal exam    breath sounds clear to auscultation  (+) asthma (hasnt used rescue inhaler in months),sleep apnea  (-) shortness of breath, recent URI, not a smoker  Cardiovascular - normal exam  Exercise tolerance: good (4-7 METS)    ECG reviewed  Patient on routine beta blocker  Rhythm: regular    (+) hypertension well controlled less than 2 medications  (-) valvular problems/murmurs, past MI, angina, cardiac stents, DVT    ROS comment: 12 lead EKG NSR with PACs 8/2023    Neuro/Psych  (+) dizziness/light headedness (hx 1 year ago, resolved recently), psychiatric history Anxiety  (-) seizures, CVA    ROS Comment: Ambien and trazadone every night  GI/Hepatic/Renal/Endo    (+) GERD well controlled, renal disease (1 kidney)  (-) diabetes, no thyroid disorder    Musculoskeletal     (+) back pain (with sciatic pain), joint swelling (bilateral knee, right knee)  (-) neck pain, neck stiffness  Abdominal   (+) obese    Abdomen: soft.   Substance History   (+) drug use (opioid addiction, clean since may. Take kradiol for pain.)  (-) alcohol use     OB/GYN          Other   arthritis (bilateral knees),chronic steroid use (shoulder injections)    history of cancer (skin) remission                    Anesthesia Plan    ASA 2     general     intravenous induction     Anesthetic plan, risks, benefits, and alternatives have been provided, discussed and informed consent has been obtained with: patient and spouse/significant other.    Use of blood products discussed with patient and spouse/significant other  Consented to blood products.    Plan discussed with CRNA.    CODE STATUS:

## 2023-09-18 ENCOUNTER — OFFICE VISIT (OUTPATIENT)
Dept: ORTHOPEDIC SURGERY | Facility: CLINIC | Age: 63
End: 2023-09-18
Payer: COMMERCIAL

## 2023-09-18 VITALS — WEIGHT: 244 LBS | BODY MASS INDEX: 33.05 KG/M2 | HEIGHT: 72 IN

## 2023-09-18 DIAGNOSIS — M94.261 CHONDROMALACIA OF KNEE, RIGHT: ICD-10-CM

## 2023-09-18 DIAGNOSIS — G89.29 CHRONIC LEFT SHOULDER PAIN: Primary | ICD-10-CM

## 2023-09-18 DIAGNOSIS — M75.21 BICEPS TENDINITIS OF RIGHT UPPER EXTREMITY: ICD-10-CM

## 2023-09-18 DIAGNOSIS — M75.22 BICEPS TENDINITIS OF LEFT UPPER EXTREMITY: ICD-10-CM

## 2023-09-18 DIAGNOSIS — M75.111 INCOMPLETE TEAR OF RIGHT ROTATOR CUFF, UNSPECIFIED WHETHER TRAUMATIC: ICD-10-CM

## 2023-09-18 DIAGNOSIS — M75.112 NONTRAUMATIC INCOMPLETE TEAR OF LEFT ROTATOR CUFF: ICD-10-CM

## 2023-09-18 DIAGNOSIS — M25.512 CHRONIC LEFT SHOULDER PAIN: Primary | ICD-10-CM

## 2023-09-18 RX ADMIN — LIDOCAINE HYDROCHLORIDE 8 ML: 10 INJECTION, SOLUTION EPIDURAL; INFILTRATION; INTRACAUDAL; PERINEURAL at 12:20

## 2023-09-18 RX ADMIN — TRIAMCINOLONE ACETONIDE 80 MG: 40 INJECTION, SUSPENSION INTRA-ARTICULAR; INTRAMUSCULAR at 12:20

## 2023-09-18 RX ADMIN — TRIAMCINOLONE ACETONIDE 80 MG: 40 INJECTION, SUSPENSION INTRA-ARTICULAR; INTRAMUSCULAR at 12:18

## 2023-09-18 RX ADMIN — LIDOCAINE HYDROCHLORIDE 4 ML: 10 INJECTION, SOLUTION EPIDURAL; INFILTRATION; INTRACAUDAL; PERINEURAL at 12:18

## 2023-09-18 NOTE — PROGRESS NOTES
Subjective:     Patient ID: Sawyer Moraes is a 63 y.o. male.    Chief Complaint:  Follow-up bilateral shoulder pain, follow-up MRI right shoulder  Left shoulder subacromial injection-7/31/2023    Right knee pain, new issue    History of Present Illness  Sawyer Moraes returns to clinic today for follow-up evaluation of right shoulder as well as new evaluation in regards to his right knee.    In regards to his shoulder, he states that he has had fairly persistent pain over the anterolateral aspect of his right shoulder, rates as a 5 to 6 out of 10, aching in nature, occasional sharp pain noted. He denies any significant change in regards to any weakness of his shoulder at this point in time. He has had no fevers, chills, or sweats. He is here today for follow-up on MRI as well. He has had minimal improvement with activity modification and anti-inflammatory medications for the right shoulder at this time.    In regards to his right knee, he states that he started to have a sudden onset of pain over the anterior aspect of his knee that began on 08/31/2023. Pain was moderate to severe in intensity. It is mild to moderate in intensity at this point in time, localized over the anterior aspect of his knee. He had an x-ray taken at that point in time. He denies any nikole locking or catching. Mild improvement with rest. Minimal improvement with anti-inflammatory medications. He does describe as aching in nature. He denies radiation of pain. He denies associated numbness or tingling. He denies any nikole buckling or giving way episodes. Mild swelling that does wax and wane.       Social History     Occupational History    Not on file   Tobacco Use    Smoking status: Never     Passive exposure: Never    Smokeless tobacco: Never   Vaping Use    Vaping Use: Never used   Substance and Sexual Activity    Alcohol use: Not Currently    Drug use: Not Currently     Types: Oxycodone     Comment: Was in treatment for opiod addiction    Sexual  "activity: Yes     Partners: Female     Birth control/protection: Vasectomy      Past Medical History:   Diagnosis Date    Asthma     about 6 months ago during covid had issues does not use inhaler    Saavedra esophagus     Cancer     skin cancers to back    Cervical disc disorder 2010    Had epidural injections which helped    Diarrhea     GERD (gastroesophageal reflux disease)     Hypertension     Kidney trouble     only has one kidney    Knee sprain 09/23    Treated at [facility] ER for knee sprain    Knee swelling 2020    Saw Dr. Rivas Patton for knee problem.    Low back strain 2010    Lumbosacral disc disease 2015 herniated disk    Epdural helped. Take oxycodone for pain    Periarthritis of shoulder 2020    Rotator cuff syndrome 2020    Was seeing [facility] Patton ortho, have been taking Cortisone shots which help. PT. at [facility] for 6 months helped. Pain is returning.    Scoliosis 2010    Sleep apnea     uses CPAP    Tendinitis of knee 2010    Urgency of urination     frequency     Past Surgical History:   Procedure Laterality Date    COLONOSCOPY      CYSTOSCOPY RETROGRADE PYELOGRAM Right 09/11/2023    Procedure: CYSTOSCOPY BILATERAL  RETROGRADES WITH PYELOGRAM;  Surgeon: Vince Hughes MD;  Location: [facility];  Service: Urology;  Laterality: Right;    ENDOSCOPY N/A 04/04/2023    Procedure: ESOPHAGOGASTRODUODENOSCOPY with 15-18mm Balloon;  Surgeon: Quincy Tong MD;  [facility]: [facility] OR;  Service: Gastroenterology;  Laterality: N/A;  Ulcerative Esophagitis, duodenitis, Gastritis, Esophageal Stricture    LAPAROSCOPIC CHOLECYSTECTOMY         Family History   Problem Relation Age of Onset    Diabetes Father     Heart disease Father          Review of Systems        Objective:  Vitals:    09/18/23 1116   Weight: 111 kg (244 lb)   Height: 182.9 cm (72\")         09/18/23  1116   Weight: 111 kg (244 lb)     Body mass index is 33.09 kg/m².  Physical Exam    Vital signs reviewed.   General: No acute " distress, alert and oriented  Eyes: conjunctiva clear; pupils equally round and reactive  ENT: external ears and nose atraumatic; oropharynx clear  CV: no peripheral edema  Resp: normal respiratory effort  Skin: no rashes or wounds; normal turgor  Psych: mood and affect appropriate; recent and remote memory intact        Ortho Exam       Right shoulder-  Maximal tenderness to palpation anteriorly over the biceps tendon.  FF- Active- to 175 degrees  Strength- 4+/5  ER- Active- to 55 degrees  Strength- 4+/5  IR- T12  Belly press test strength- 4/5  Bear hug sign- Mildly positive  Empty can test- Mildly positive  Drop arm test- Negative  Ext rotation lag sign- Negative  Neer's sign- Positive  Starr- Positive  Yergason- Positive  Speeds- Positive  Santo's- Positive  Brisk cap refill to all digits, palpable 2+ radial pulse right wrist.    Right Knee-  ROM 0 to 130 degrees  4+/5 on flexion  4+/5 on extension  Maximal tenderness to palpation along medial and lateral patellar facet.  Mild tenderness along medial joint line.  Griselda's exam- Negative  Effusion- Mild  Grade 1A Lachman  Anterior drawer- Negative  Posterior drawer- Negative  Stable opening on varus and valgus stress at 0 and 30 degrees.  Active patellar compression test- Positive  Log roll- No hip pain  [facility]- No hip pain  Straight leg raise- Negative, right lower extremity  Positive sensation light touch all distributions symmetric to contralateral side.  Brisk cap refill all digits  2+ dorsalis pedis pulse, right foot    Imaging:    MRI Shoulder Right Without Contrast    Result Date: 8/24/2023   1. Partial-thickness interstitial tear of the superior insertional subscapularis tendon, involving less than 50% thickness, but resulting in partial medial subluxation of the long head biceps tendon from the bicipital groove. 2. Moderate long head biceps tendinosis with longitudinal partial-thickness tearing/fraying at the biceps pulley, involving less than  25% thickness. 3. Mild to moderate supraspinatus and infraspinatus tendinopathy without tear. 4. Tear of the posterior superior quadrant glenoid labrum with an associated 1.9 cm paralabral cyst. 5. Small glenohumeral effusion with mild synovial proliferation. 6. Mild AC joint arthrosis without significant outlet impingement or bursal inflammation. 7. Partial teres minor fatty atrophy which may represent the sequelae of chronic denervation. No quadrilateral space mass identified.    This report was finalized on 8/24/2023 7:30 AM by Dr. Laurent Gifford MD.       Review of outside MRI right shoulder including review of imaging as well as radiology report indicates partial-thickness insertional tear of the superior portion subscapularis less than 50% thickness with medial subluxation of the long head of biceps tendon with significant intra-articular longitudinal tearing.  Rotator cuff tendinopathy of the supraspinatus and infraspinatus appreciated with tear of the posterior quadrant of the glenoid consistent with SLAP lesion.  Mild AC joint arthritis.      XR Knee 3 View Right    Result Date: 8/31/2023  Negative right knee.  This report was finalized on 8/31/2023 8:40 PM by Dr. Laurent Gifford MD.            1. Chronic left shoulder pain    2. Nontraumatic incomplete tear of left rotator cuff    3. Biceps tendinitis of left upper extremity    4. Biceps tendinitis of right upper extremity    5. Incomplete tear of right rotator cuff, unspecified whether traumatic- SUBSCAP upper border           Plan:    - Large Joint Arthrocentesis: R subacromial bursa on 9/18/2023 12:18 PM  Indications: pain  Details: 22 G needle, lateral approach  Medications: 80 mg triamcinolone acetonide 40 MG/ML; 4 mL lidocaine PF 1% 1 %  Outcome: tolerated well, no immediate complications  Procedure, treatment alternatives, risks and benefits explained, specific risks discussed. Immediately prior to procedure a time out was called to verify the correct  patient, procedure, equipment, support staff and site/side marked as required. Patient was prepped and draped in the usual sterile fashion.       - Large Joint Arthrocentesis: R knee on 9/18/2023 12:20 PM  Indications: pain  Details: 22 G needle, anterolateral approach  Medications: 80 mg triamcinolone acetonide 40 MG/ML; 8 mL lidocaine PF 1% 1 %  Outcome: tolerated well, no immediate complications  Procedure, treatment alternatives, risks and benefits explained, specific risks discussed. Consent was given by the patient. Immediately prior to procedure a time out was called to verify the correct patient, procedure, equipment, support staff and site/side marked as required. Patient was prepped and draped in the usual sterile fashion.             1. I discussed treatment options at length with patient at today's visit. Reviewed findings from his MRI of his shoulder indicating significant biceps tendon pathology with partial thickness tearing of the subscapularis at the upper border. At this point in time, he would like to proceed with injection. We did discuss option for surgical treatment with biceps tenodesis and possible rotator cuff repair with subacromial decompression. He wants to hold off on surgery at this time.  2. The patient would like to proceed with cortisone injection today to the right shoulder subacromial space. I recommended limited use of affected extremity for the next 24 hours to only essential activities other than work on general active and passive motion. I recommended supplementing with ice and soft tissue massage. I discussed with patient that they should see results in 5-7 days, if no improvement in 5-6 weeks I have asked them to call the office to review other options. The patient should call office immediately if they notice redness, warmth, fevers, chills, or residual numbness or tingling for greater than 6 hours after injection.  3. In regards to right knee, we discussed options including  advanced imaging, physical therapy, and injection. Patient would like to proceed with injection today and continue home exercises.  4. Patient would like to proceed with cortisone injection today to the right knee. Recommended limited use of affected extremity for the next 24 hours to only essential activities other than work on general active and passive motion. Recommended supplementing with ice and soft tissue massage. Discussed with patient that they should see results in 5-7 days, if no improvement in 5-6 weeks I have asked them to call the office to review other options. Patient should call office immediately if they notice redness, warmth, fevers, chills, or residual numbness or tingling for greater than 6 hours after injection.  5. He will follow up in 3 months or sooner if needed.      Sawyer Moraes was in agreement with plan and had all questions answered.     Orders:  Orders Placed This Encounter   Procedures    - Large Joint Arthrocentesis: R subacromial bursa    - Large Joint Arthrocentesis: R knee       Medications:  No orders of the defined types were placed in this encounter.      Followup:  Return in about 3 months (around 12/18/2023).    Diagnoses and all orders for this visit:    1. Chronic left shoulder pain (Primary)    2. Nontraumatic incomplete tear of left rotator cuff    3. Biceps tendinitis of left upper extremity    4. Biceps tendinitis of right upper extremity    5. Incomplete tear of right rotator cuff, unspecified whether traumatic- SUBSCAP upper border    Other orders  -     - Large Joint Arthrocentesis: R subacromial bursa  -     - Large Joint Arthrocentesis: R knee          Dictated utilizing Dragon dictation     Transcribed from ambient dictation for Steve Altman MD by Nicole Celis.  09/18/23   12:43 EDT    Patient or patient representative verbalized consent to the visit recording.  I have personally performed the services described in this document as transcribed by the above  individual, and it is both accurate and complete.

## 2023-10-01 RX ORDER — TRIAMCINOLONE ACETONIDE 40 MG/ML
80 INJECTION, SUSPENSION INTRA-ARTICULAR; INTRAMUSCULAR
Status: COMPLETED | OUTPATIENT
Start: 2023-09-18 | End: 2023-09-18

## 2023-10-01 RX ORDER — LIDOCAINE HYDROCHLORIDE 10 MG/ML
8 INJECTION, SOLUTION EPIDURAL; INFILTRATION; INTRACAUDAL; PERINEURAL
Status: COMPLETED | OUTPATIENT
Start: 2023-09-18 | End: 2023-09-18

## 2023-10-01 RX ORDER — LIDOCAINE HYDROCHLORIDE 10 MG/ML
4 INJECTION, SOLUTION EPIDURAL; INFILTRATION; INTRACAUDAL; PERINEURAL
Status: COMPLETED | OUTPATIENT
Start: 2023-09-18 | End: 2023-09-18

## 2023-10-11 DIAGNOSIS — G89.29 CHRONIC LEFT SHOULDER PAIN: Primary | ICD-10-CM

## 2023-10-11 DIAGNOSIS — M25.512 CHRONIC LEFT SHOULDER PAIN: Primary | ICD-10-CM

## 2023-10-11 NOTE — TELEPHONE ENCOUNTER
Patient requesting pain cream from [facility].    Rx   Requested Prescriptions     Pending Prescriptions Disp Refills    [facility] Neuropathic Pain Cream 90 g 5     Sig: Apply 1-2 g topically to the appropriate area as directed 3 (Three) to 4 (Four) times daily.      Last office visit with prescribing clinician: 9/18/2023      Next office visit with prescribing clinician: 12/18/2023     DX:   1. Chronic left shoulder pain    2. Nontraumatic incomplete tear of left rotator cuff    3. Biceps tendinitis of left upper extremity    4. Biceps tendinitis of right upper extremity    5. Incomplete tear of right rotator cuff, unspecified whether traumatic- SUBSCAP upper border        Rani Castillo MA  10/11/23, 13:47 EDT    RX pended for your approval, change or denial.     {TIP  Encounters:    {TIP  Please add Last Relevant Lab Date if appropriate:  {TIP  Is Refill Pharmacy correct?:

## 2023-12-18 ENCOUNTER — OFFICE VISIT (OUTPATIENT)
Dept: ORTHOPEDIC SURGERY | Facility: CLINIC | Age: 63
End: 2023-12-18
Payer: COMMERCIAL

## 2023-12-18 VITALS — BODY MASS INDEX: 33.05 KG/M2 | HEIGHT: 72 IN | WEIGHT: 244 LBS

## 2023-12-18 DIAGNOSIS — M75.112 NONTRAUMATIC INCOMPLETE TEAR OF LEFT ROTATOR CUFF: ICD-10-CM

## 2023-12-18 DIAGNOSIS — M75.22 BICEPS TENDINITIS OF LEFT UPPER EXTREMITY: ICD-10-CM

## 2023-12-18 DIAGNOSIS — M94.261 CHONDROMALACIA OF KNEE, RIGHT: ICD-10-CM

## 2023-12-18 DIAGNOSIS — M75.21 BICEPS TENDINITIS OF RIGHT UPPER EXTREMITY: ICD-10-CM

## 2023-12-18 DIAGNOSIS — M75.111 INCOMPLETE TEAR OF RIGHT ROTATOR CUFF, UNSPECIFIED WHETHER TRAUMATIC: ICD-10-CM

## 2023-12-18 DIAGNOSIS — M25.511 RIGHT SHOULDER PAIN, UNSPECIFIED CHRONICITY: Primary | ICD-10-CM

## 2023-12-18 RX ORDER — TRAZODONE HYDROCHLORIDE 50 MG/1
100 TABLET ORAL
COMMUNITY

## 2023-12-18 RX ADMIN — LIDOCAINE HYDROCHLORIDE 4 ML: 10 INJECTION, SOLUTION EPIDURAL; INFILTRATION; INTRACAUDAL; PERINEURAL at 12:28

## 2023-12-18 RX ADMIN — TRIAMCINOLONE ACETONIDE 80 MG: 40 INJECTION, SUSPENSION INTRA-ARTICULAR; INTRAMUSCULAR at 12:28

## 2023-12-18 NOTE — PROGRESS NOTES
Subjective:     Patient ID: Sawyer Moraes is a 63 y.o. male.    Chief Complaint:  Follow-up right shoulder pain, partial-thickness rotator cuff tear, biceps tendinitis  Last injection-right shoulder subacromial space-9/18/2023    Follow-up left shoulder pain, partial-thickness rotator cuff tear, biceps tendinitis    Follow-up right knee pain, chondromalacia  Last injection-9/18/2023  History of Present Illness  Sawyer Moraes returns to clinic today for evaluation of left shoulder primarily at this point in time given his significant level pain on that side, rates his pain as moderate to severe intensity, aching in nature, localized primarily to the lateral aspect of the shoulder with moderate pain radiating anteriorly.  Denies any radiation below the level of the elbow.  Denies associated numbness or tingling.  He has had good response with prior injection.  Denies any new injury, denies any fevers chills or sweats.     Social History     Occupational History    Not on file   Tobacco Use    Smoking status: Never     Passive exposure: Never    Smokeless tobacco: Never   Vaping Use    Vaping Use: Never used   Substance and Sexual Activity    Alcohol use: Not Currently    Drug use: Not Currently     Types: Oxycodone     Comment: Was in treatment for opiod addiction    Sexual activity: Yes     Partners: Female     Birth control/protection: Vasectomy      Past Medical History:   Diagnosis Date    Asthma     about 6 months ago during covid had issues does not use inhaler    Saavedra esophagus     Cancer     skin cancers to back    Cervical disc disorder 2010    Had epidural injections which helped    Diarrhea     GERD (gastroesophageal reflux disease)     Hypertension     Kidney trouble     only has one kidney    Knee sprain 09/23    Treated at [facility] ER for knee sprain    Knee swelling 2020    Saw Dr. Rivas Patton for knee problem.    Low back strain 2010    Lumbosacral disc disease 2015 herniated disk    Epdural helped. Take  "oxycodone for pain    Periarthritis of shoulder 2020    Rotator cuff syndrome 2020    Was seeing Zul[facility] ortho, have been taking Cortisone shots which help. [facility]. Pain is returning.    Scoliosis 2010    Sleep apnea     uses CPAP    Tendinitis of knee 2010    Urgency of urination     frequency     Past Surgical History:   Procedure Laterality Date    COLONOSCOPY      CYSTOSCOPY RETROGRADE PYELOGRAM Right 09/11/2023    Procedure: CYSTOSCOPY BILATERAL  RETROGRADES WITH PYELOGRAM;  Surgeon: Vince Hughes MD;  Location: [facility] OR;  Service: Urology;  Laterality: Right;    ENDOSCOPY N/A 04/04/2023    Procedure: ESOPHAGOGASTRODUODENOSCOPY with 15-18mm Balloon;  Surgeon: Quincy Tong MD;  Location: [facility];  Service: Gastroenterology;  Laterality: N/A;  Ulcerative Esophagitis, duodenitis, Gastritis, Esophageal Stricture    LAPAROSCOPIC CHOLECYSTECTOMY         Family History   Problem Relation Age of Onset    Diabetes Father     Heart disease Father          Review of Systems        Objective:  Vitals:    12/18/23 1123   Weight: 111 kg (244 lb)   Height: 182.9 cm (72\")         12/18/23  1123   Weight: 111 kg (244 lb)     Body mass index is 33.09 kg/m².  General: No acute distress.  Resp: normal respiratory effort  Skin: no rashes or wounds; normal turgor  Psych: mood and affect appropriate; recent and remote memory intact        Ortho Exam     Left shoulder-active forward flexion 170 degrees with 4+ out of 5 strength, external rotation 60 degrees with 4+ out of 5 strength, internal rotation T12, 5-5 strength of belly press test, negative bearhug sign, brisk cap refill all digits, 1+ radial pulse left wrist.  Negative drop arm test, negative external rotation lag sign, positive empty can, positive Starr and Neer's.    Imaging:  None today  Assessment:        1. Right shoulder pain, unspecified chronicity    2. Chondromalacia of knee, right    3. Biceps " tendinitis of right upper extremity    4. Incomplete tear of right rotator cuff, unspecified whether traumatic- SUBSCAP upper border           Plan:  - Large Joint Arthrocentesis: L subacromial bursa on 12/18/2023 12:28 PM  Indications: pain  Details: 22 G needle, lateral approach  Medications: 80 mg triamcinolone acetonide 40 MG/ML; 4 mL lidocaine PF 1% 1 %  Outcome: tolerated well, no immediate complications  Procedure, treatment alternatives, risks and benefits explained, specific risks discussed. Consent was given by the patient. Immediately prior to procedure a time out was called to verify the correct patient, procedure, equipment, support staff and site/side marked as required. Patient was prepped and draped in the usual sterile fashion.             Discussed treatment options at length with patient at today's visit.  At this point in time patient would like to proceed with repeat injection today to the left shoulder given prior success.  He does not want to proceed with any type of surgical intervention at this time.  Patient would like to proceed with cortisone injection today to the left shoulder subacromial space. Recommended limited use of affected extremity for the next 24 hours to only essential activites other than work on general active and passive motion. Recommended supplementing with ice and soft tissue massage. Discussed with patient that they should see results in 5-7 days, if no improvement in 5-6 weeks I have asked them to call the office to review other options. Patient should call office immediately if they notice redness, warmth, fevers, chills, or residual numbness or tingling for greater than 6 hours after injection.   Continue with home exercise to work on range of motion and strength as tolerated  Follow up: As needed      Sawyer Moraes was in agreement with plan and had all questions answered.     Orders:  No orders of the defined types were placed in this encounter.      Medications:  No  orders of the defined types were placed in this encounter.      Followup:  No follow-ups on file.    Diagnoses and all orders for this visit:    1. Right shoulder pain, unspecified chronicity (Primary)    2. Chondromalacia of knee, right    3. Biceps tendinitis of right upper extremity    4. Incomplete tear of right rotator cuff, unspecified whether traumatic- SUBSCAP upper border          Dictated utilizing Dragon dictation

## 2023-12-20 RX ORDER — TRIAMCINOLONE ACETONIDE 40 MG/ML
80 INJECTION, SUSPENSION INTRA-ARTICULAR; INTRAMUSCULAR
Status: COMPLETED | OUTPATIENT
Start: 2023-12-18 | End: 2023-12-18

## 2023-12-20 RX ORDER — LIDOCAINE HYDROCHLORIDE 10 MG/ML
4 INJECTION, SOLUTION EPIDURAL; INFILTRATION; INTRACAUDAL; PERINEURAL
Status: COMPLETED | OUTPATIENT
Start: 2023-12-18 | End: 2023-12-18

## 2024-01-24 ENCOUNTER — OFFICE VISIT (OUTPATIENT)
Dept: GASTROENTEROLOGY | Facility: CLINIC | Age: 64
End: 2024-01-24
Payer: COMMERCIAL

## 2024-01-24 VITALS
SYSTOLIC BLOOD PRESSURE: 118 MMHG | HEIGHT: 72 IN | DIASTOLIC BLOOD PRESSURE: 84 MMHG | WEIGHT: 258.2 LBS | BODY MASS INDEX: 34.97 KG/M2

## 2024-01-24 DIAGNOSIS — K21.00 GASTROESOPHAGEAL REFLUX DISEASE WITH ESOPHAGITIS WITHOUT HEMORRHAGE: Primary | ICD-10-CM

## 2024-01-24 DIAGNOSIS — Z87.19 HISTORY OF ESOPHAGEAL STRICTURE: ICD-10-CM

## 2024-01-24 RX ORDER — VONOPRAZAN FUMARATE 26.72 MG/1
20 TABLET ORAL DAILY
Qty: 30 TABLET | Refills: 1 | Status: SHIPPED | OUTPATIENT
Start: 2024-01-24

## 2024-01-24 NOTE — PROGRESS NOTES
PATIENT INFORMATION  Sawyer Moraes       - 1960    CHIEF COMPLAINT  Chief Complaint   Patient presents with    Heartburn    Saavedra's Esophagus     Esophageal Stricture       HISTORY OF PRESENT ILLNESS  Here today for GERD follow-up    GERD: Per LOV was doing well on daily PPI.  Reflux is doing well when avoiding acidic drinks, such a coffee. Water calms reflux down. Had chicken stick within the last week, was eating too fast and finally passed. General week experiencing reflux 3 days a week. Has had no other difficulty swallowing.    2023 EGD for HB and dysphagia, positive for stricture, mild chronic gastritis, erosive reflux esophagitis with barretts, no dysplasia, celiac, HP.     10/25/2021 last colon normal with hemorrhoids. Personal hx polyps.    Heartburn  He reports no abdominal pain or no nausea.       REVIEWED PERTINENT RESULTS/ LABS  Lab Results   Component Value Date    CASEREPORT  2023     Surgical Pathology Report                         Case: [id number]                                  Authorizing Provider:  Quincy Tong        Collected:           2023 02:08 PM                                 MD Shanique                                                                   Ordering Location:     [facility]     Received:            2023 02:50 PM                                 [facility] MAIN OR                                                     Pathologist:           Ryland Rai MD                                                         Specimens:   1) - Small Intestine, duodenum Biopsy                                                               2) - Gastric, Antrum, gastric biopsy                                                                3) - Esophagus, Distal, Distal Esophagus Biopsy                                            FINALDX  2023     1. Small Bowel, Duodenum, Biopsy: Benign small bowel mucosa with    A. Normal intact  villous surface.   B. No significant inflammation, no granulomas.   C. No viral inclusions or other organisms on routinely stained sections.     2. Stomach, Antrum, Biopsy: Benign gastric mucosa with   A. Fragment consistent with fundic gland polyp.   B. Mild chronic inflammation.   C. No intestinal metaplasia.   D. No Helicobacter pylori.    3. Esophagus, Distal, Biopsy: Benign squamous and gastric mucosa with   A. Intestinal metaplasia consistent with Saavedra's esophagus with reactive changes and no definitive dysplasia.   B. Esophagitis consistent with reflux.   C. No Helicobacter pylori.    amina/jse        Lab Results   Component Value Date    HGB 15.9 08/28/2023    MCV 92.2 08/28/2023     08/28/2023    ALT 21 12/29/2022    AST 16 12/29/2022      No results found.    REVIEW OF SYSTEMS  Review of Systems   Constitutional: Negative.    HENT: Negative.     Eyes: Negative.    Respiratory: Negative.     Cardiovascular: Negative.    Gastrointestinal:  Negative for abdominal pain, constipation, diarrhea, nausea and vomiting.        Saavedra's, GERD, Esophageal stricture    Endocrine: Negative.    Genitourinary: Negative.    Musculoskeletal:  Positive for neck pain.   Skin: Negative.    Allergic/Immunologic: Negative.    Neurological: Negative.    Hematological: Negative.    Psychiatric/Behavioral: Negative.           ACTIVE PROBLEMS  Patient Active Problem List    Diagnosis     Abnormal CT scan, kidney [R93.429]     Gross hematuria [R31.0]     History of esophageal stricture [Z87.19]     Saavedra's esophagus [K22.70]     Gastroesophageal reflux disease with esophagitis without hemorrhage [K21.00]     Esophageal dysphagia [R13.19]          PAST MEDICAL HISTORY  Past Medical History:   Diagnosis Date    Asthma     about 6 months ago during covid had issues does not use inhaler    Saavedra esophagus     Cancer     skin cancers to back    Cervical disc disorder 2010    Had epidural injections which helped     Cholelithiasis Removed 2007    Colon polyp Benign- removed    Diarrhea     GERD (gastroesophageal reflux disease)     Hypertension     Kidney trouble     only has one kidney    Knee sprain 09/23    Treated at [facility] ER for knee sprain    Knee swelling 2020    Saw Dr. Rivas Patton for knee problem.    Low back strain 2010    Lumbosacral disc disease 2015 herniated disk    Epdural helped. Take oxycodone for pain    Periarthritis of shoulder 2020    Rotator cuff syndrome 2020    Was seeing Zul[facility] Patton ortho, have been taking Cortisone shots which help. PT. at [facility] for 6 months helped. Pain is returning.    Scoliosis 2010    Sleep apnea     uses CPAP    Tendinitis of knee 2010    Urgency of urination     frequency         SURGICAL HISTORY  Past Surgical History:   Procedure Laterality Date    COLONOSCOPY      CYSTOSCOPY RETROGRADE PYELOGRAM Right 09/11/2023    Procedure: CYSTOSCOPY BILATERAL  RETROGRADES WITH PYELOGRAM;  Surgeon: Vince Hughes MD;  Location: [facility] OR;  Service: Urology;  Laterality: Right;    ENDOSCOPY N/A 04/04/2023    Procedure: ESOPHAGOGASTRODUODENOSCOPY with 15-18mm Balloon;  Surgeon: Quincy Tong MD;  Location: [facility] OR;  Service: Gastroenterology;  Laterality: N/A;  Ulcerative Esophagitis, duodenitis, Gastritis, Esophageal Stricture    LAPAROSCOPIC CHOLECYSTECTOMY      UPPER GASTROINTESTINAL ENDOSCOPY  2008         FAMILY HISTORY  Family History   Problem Relation Age of Onset    Diabetes Father     Heart disease Father          SOCIAL HISTORY  Social History     Occupational History    Not on file   Tobacco Use    Smoking status: Never     Passive exposure: Never    Smokeless tobacco: Never   Vaping Use    Vaping Use: Never used   Substance and Sexual Activity    Alcohol use: Not Currently    Drug use: Not Currently     Types: Oxycodone     Comment: Was in treatment for opiod addiction    Sexual activity: Yes     Partners: Female     Birth control/protection:  "Vasectomy         CURRENT MEDICATIONS    Current Outpatient Medications:     cyclobenzaprine (FLEXERIL) 5 MG tablet, Take 1 tablet by mouth 2 (Two) Times a Day As Needed., Disp: , Rfl:     fluticasone (FLONASE) 50 MCG/ACT nasal spray, 1 spray by Each Nare route As Needed., Disp: , Rfl:     folic acid (FOLVITE) 1 MG tablet, Take 1 tablet by mouth Daily., Disp: , Rfl:     Gabapentin 10 %, Ketamine HCl 4 %, Apply 1-2 g topically to the appropriate area as directed 3 (Three) to 4 (Four) times daily., Disp: 90 g, Rfl: 5    metoprolol tartrate (LOPRESSOR) 25 MG tablet, Take 2 tablets by mouth 2 (Two) Times a Day., Disp: , Rfl:     modafinil (PROVIGIL) 200 MG tablet, Take 1 tablet by mouth As Needed., Disp: , Rfl:     NON FORMULARY, Kratom used for pain, Disp: , Rfl:     Sildenafil Citrate (VIAGRA PO), Take  by mouth As Needed., Disp: , Rfl:     sucralfate (CARAFATE) 1 g tablet, Take 1 tablet by mouth As Needed., Disp: , Rfl:     tamsulosin (FLOMAX) 0.4 MG capsule 24 hr capsule, Take 1 capsule by mouth 2 (Two) Times a Day., Disp: , Rfl:     traZODone (DESYREL) 50 MG tablet, Take 2 tablets by mouth every night at bedtime., Disp: , Rfl:     zolpidem (AMBIEN) 10 MG tablet, 1 tablet At Night As Needed., Disp: , Rfl:     Vonoprazan Fumarate (Voquezna) 20 MG tablet, Take 1 tablet by mouth Daily., Disp: 30 tablet, Rfl: 1    ALLERGIES  Aspirin, Ibuprofen, and Nsaids    VITALS  Vitals:    01/24/24 1059   BP: 118/84   BP Location: Left arm   Patient Position: Sitting   Cuff Size: Large Adult   Weight: 117 kg (258 lb 3.2 oz)   Height: 182.9 cm (72.01\")       PHYSICAL EXAM  Debilities/Disabilities Identified: None  Emotional Behavior: Appropriate  Wt Readings from Last 3 Encounters:   01/24/24 117 kg (258 lb 3.2 oz)   12/18/23 111 kg (244 lb)   09/18/23 111 kg (244 lb)     Ht Readings from Last 1 Encounters:   01/24/24 182.9 cm (72.01\")     Body mass index is 35.01 kg/m².  Physical Exam  Constitutional:       General: He is not in " acute distress.     Appearance: Normal appearance. He is not ill-appearing.   HENT:      Head: Normocephalic and atraumatic.      Mouth/Throat:      Mouth: Mucous membranes are moist.      Pharynx: No posterior oropharyngeal erythema.   Eyes:      General: No scleral icterus.  Cardiovascular:      Rate and Rhythm: Normal rate and regular rhythm.      Heart sounds: Normal heart sounds.   Pulmonary:      Effort: Pulmonary effort is normal.      Breath sounds: Normal breath sounds.   Abdominal:      General: Abdomen is flat. Bowel sounds are normal. There is no distension.      Palpations: Abdomen is soft. There is no mass.      Tenderness: There is no abdominal tenderness. There is no guarding or rebound. Negative signs include Fu's sign.      Hernia: No hernia is present.   Musculoskeletal:      Cervical back: Neck supple.   Skin:     General: Skin is warm.      Capillary Refill: Capillary refill takes less than 2 seconds.   Neurological:      General: No focal deficit present.      Mental Status: He is alert and oriented to person, place, and time.   Psychiatric:         Mood and Affect: Mood normal.         Behavior: Behavior normal.         Thought Content: Thought content normal.         Judgment: Judgment normal.         CLINICAL DATA REVIEWED   reviewed previous lab results and integrated with today's visit, reviewed notes from other physicians and/or last GI encounter, reviewed previous endoscopy results and available photos, reviewed surgical pathology results from previous biopsies    ASSESSMENT  Diagnoses and all orders for this visit:    Gastroesophageal reflux disease with esophagitis without hemorrhage  -     Vonoprazan Fumarate (Voquezna) 20 MG tablet; Take 1 tablet by mouth Daily.    History of esophageal stricture          PLAN    Will start voquenza, stop pantoprazole  Sucralfate PRN  CALL if anymore dysphagia    Return in about 6 weeks (around 3/6/2024).    I have discussed the above plan with  the patient.  They verbalize understanding and are in agreement with the plan.  They have been advised to contact the office for any questions, concerns, or changes related to their health.

## 2024-02-19 ENCOUNTER — PRE-ADMISSION TESTING (OUTPATIENT)
Dept: PREADMISSION TESTING | Facility: HOSPITAL | Age: 64
End: 2024-02-19
Payer: COMMERCIAL

## 2024-02-19 VITALS
HEART RATE: 84 BPM | SYSTOLIC BLOOD PRESSURE: 148 MMHG | TEMPERATURE: 97.2 F | DIASTOLIC BLOOD PRESSURE: 85 MMHG | WEIGHT: 257.3 LBS | HEIGHT: 72 IN | BODY MASS INDEX: 34.85 KG/M2 | RESPIRATION RATE: 16 BRPM | OXYGEN SATURATION: 97 %

## 2024-02-19 LAB
ABO GROUP BLD: NORMAL
ALBUMIN SERPL-MCNC: 3.9 G/DL (ref 3.5–5.2)
ALBUMIN/GLOB SERPL: 1.4 G/DL
ALP SERPL-CCNC: 85 U/L (ref 39–117)
ALT SERPL W P-5'-P-CCNC: 22 U/L (ref 1–41)
ANION GAP SERPL CALCULATED.3IONS-SCNC: 12.9 MMOL/L (ref 5–15)
AST SERPL-CCNC: 17 U/L (ref 1–40)
BILIRUB SERPL-MCNC: 0.3 MG/DL (ref 0–1.2)
BLD GP AB SCN SERPL QL: NEGATIVE
BUN SERPL-MCNC: 17 MG/DL (ref 8–23)
BUN/CREAT SERPL: 13 (ref 7–25)
CALCIUM SPEC-SCNC: 8.8 MG/DL (ref 8.6–10.5)
CHLORIDE SERPL-SCNC: 106 MMOL/L (ref 98–107)
CO2 SERPL-SCNC: 25.1 MMOL/L (ref 22–29)
CREAT SERPL-MCNC: 1.31 MG/DL (ref 0.76–1.27)
DEPRECATED RDW RBC AUTO: 40 FL (ref 37–54)
EGFRCR SERPLBLD CKD-EPI 2021: 61.5 ML/MIN/1.73
ERYTHROCYTE [DISTWIDTH] IN BLOOD BY AUTOMATED COUNT: 12.6 % (ref 12.3–15.4)
GLOBULIN UR ELPH-MCNC: 2.8 GM/DL
GLUCOSE SERPL-MCNC: 95 MG/DL (ref 65–99)
HCT VFR BLD AUTO: 44.7 % (ref 37.5–51)
HGB BLD-MCNC: 14.8 G/DL (ref 13–17.7)
MCH RBC QN AUTO: 29 PG (ref 26.6–33)
MCHC RBC AUTO-ENTMCNC: 33.1 G/DL (ref 31.5–35.7)
MCV RBC AUTO: 87.5 FL (ref 79–97)
PLATELET # BLD AUTO: 182 10*3/MM3 (ref 140–450)
PMV BLD AUTO: 9.8 FL (ref 6–12)
POTASSIUM SERPL-SCNC: 4 MMOL/L (ref 3.5–5.2)
PROT SERPL-MCNC: 6.7 G/DL (ref 6–8.5)
RBC # BLD AUTO: 5.11 10*6/MM3 (ref 4.14–5.8)
RH BLD: POSITIVE
SODIUM SERPL-SCNC: 144 MMOL/L (ref 136–145)
T&S EXPIRATION DATE: NORMAL
WBC NRBC COR # BLD AUTO: 5.38 10*3/MM3 (ref 3.4–10.8)

## 2024-02-19 PROCEDURE — 86900 BLOOD TYPING SEROLOGIC ABO: CPT

## 2024-02-19 PROCEDURE — 36415 COLL VENOUS BLD VENIPUNCTURE: CPT

## 2024-02-19 PROCEDURE — 80053 COMPREHEN METABOLIC PANEL: CPT

## 2024-02-19 PROCEDURE — 86850 RBC ANTIBODY SCREEN: CPT

## 2024-02-19 PROCEDURE — 85027 COMPLETE CBC AUTOMATED: CPT

## 2024-02-19 PROCEDURE — 86901 BLOOD TYPING SEROLOGIC RH(D): CPT

## 2024-02-19 RX ORDER — HYDROXYZINE HYDROCHLORIDE 10 MG/1
10 TABLET, FILM COATED ORAL 3 TIMES DAILY PRN
Status: ON HOLD | COMMUNITY

## 2024-02-19 NOTE — DISCHARGE INSTRUCTIONS
Take the following medications the morning of surgery with a small sip of water: VOQUENZA, HYDROXYZINE, CYCLOBENZAPRINE AND METOPROLOL IF NEEDED    ARRIVE TO ENTRANCE C.      If you are on prescription narcotic pain medication to control your pain you may also take that medication the morning of surgery.    General Instructions:  Do not eat or drink anything after midnight the night before surgery.  Patients who avoid smoking, chewing tobacco and alcohol for 4 weeks prior to surgery have a reduced risk of post-operative complications.  Quit smoking as many days before surgery as you can.  Do not smoke, use chewing tobacco or drink alcohol the day of surgery.   If applicable bring your C-PAP/ BI-PAP machine in with you to preop day of surgery.  Bring any papers given to you in the doctor’s office.  Wear clean comfortable clothes.  Do not wear contact lenses, false eyelashes or make-up.  Bring a case for your glasses.   Bring crutches or walker if applicable.  Remove all piercings.  Leave jewelry and any other valuables at home.  Hair extensions with metal clips must be removed prior to surgery.  The Pre-Admission Testing nurse will instruct you to bring medications if unable to obtain an accurate list in Pre-Admission Testing.        If you were given a blood bank ID arm band remember to bring it with you the day of surgery.    Preventing a Surgical Site Infection:  For 2 to 3 days before surgery, avoid shaving with a razor because the razor can irritate skin and make it easier to develop an infection.    Any areas of open skin can increase the risk of a post-operative wound infection by allowing bacteria to enter and travel throughout the body.  Notify your surgeon if you have any skin wounds / rashes even if it is not near the expected surgical site.  The area will need assessed to determine if surgery should be delayed until it is healed.  The night prior to surgery shower using a fresh bar of anti-bacterial soap  (such as Dial) and clean washcloth.  Sleep in a clean bed with clean clothing.  Do not allow pets to sleep with you.  Shower on the morning of surgery using a fresh bar of anti-bacterial soap (such as Dial) and clean washcloth.  Dry with a clean towel and dress in clean clothing.  Ask your surgeon if you will be receiving antibiotics prior to surgery.  Make sure you, your family, and all healthcare providers clean their hands with soap and water or an alcohol based hand  before caring for you or your wound.    Day of surgery:  Your arrival time is approximately two hours before your scheduled surgery time.  Upon arrival, a Pre-op nurse and Anesthesiologist will review your health history, obtain vital signs, and answer questions you may have.  The only belongings needed at this time will be your home medications and if applicable your C-PAP/BI-PAP machine.  A Pre-op nurse will start an IV and you may receive medication in preparation for surgery, including something to help you relax.      Please be aware that surgery does come with discomfort.  We want to make every effort to control your discomfort so please discuss any uncontrolled symptoms with your nurse.   Your doctor will most likely have prescribed pain medications.      If you are going home after surgery you will receive individualized written care instructions before being discharged.  A responsible adult must drive you to and from the hospital on the day of your surgery and ideally stay with you through the night.  Discharge prescriptions can be filled by the hospital pharmacy during regular pharmacy hours.  If you are having surgery late in the day/evening your prescription may be e-prescribed to your pharmacy.  Please verify your pharmacy hours or chose a 24 hour pharmacy to avoid not having access to your prescription because your pharmacy has closed for the day.    If you are staying overnight following surgery, you will be transported to  your hospital room following the recovery period.  [facility] has all private rooms.    If you have any questions please call Pre-Admission Testing at [phone].  Deductibles and co-payments are collected on the day of service. Please be prepared to pay the required co-pay, deductible or deposit on the day of service as defined by your plan.    Call your surgeon immediately if you experience any of the following symptoms:  Sore Throat  Shortness of Breath or difficulty breathing  Cough  Chills  Body soreness or muscle pain  Headache  Fever  New loss of taste or smell  Do not arrive for your surgery ill.  Your procedure will need to be rescheduled to another time.  You will need to call your physician before the day of surgery to avoid any unnecessary exposure to hospital staff as well as other patients.

## 2024-02-26 ENCOUNTER — HOSPITAL ENCOUNTER (INPATIENT)
Facility: HOSPITAL | Age: 64
LOS: 2 days | Discharge: HOME | End: 2024-02-28
Attending: UROLOGY | Admitting: UROLOGY
Payer: COMMERCIAL

## 2024-02-26 ENCOUNTER — ANESTHESIA (OUTPATIENT)
Dept: PERIOP | Facility: HOSPITAL | Age: 64
End: 2024-02-26
Payer: COMMERCIAL

## 2024-02-26 ENCOUNTER — ANESTHESIA EVENT (OUTPATIENT)
Dept: PERIOP | Facility: HOSPITAL | Age: 64
End: 2024-02-26
Payer: COMMERCIAL

## 2024-02-26 DIAGNOSIS — N28.89 URETERAL MASS: ICD-10-CM

## 2024-02-26 PROCEDURE — 25010000002 HYDROMORPHONE 1 MG/ML SOLUTION: Performed by: NURSE ANESTHETIST, CERTIFIED REGISTERED

## 2024-02-26 PROCEDURE — 25810000003 LACTATED RINGERS PER 1000 ML: Performed by: ANESTHESIOLOGY

## 2024-02-26 PROCEDURE — 25010000002 ONDANSETRON PER 1 MG: Performed by: NURSE ANESTHETIST, CERTIFIED REGISTERED

## 2024-02-26 PROCEDURE — 8E0W4CZ ROBOTIC ASSISTED PROCEDURE OF TRUNK REGION, PERCUTANEOUS ENDOSCOPIC APPROACH: ICD-10-PCS | Performed by: UROLOGY

## 2024-02-26 PROCEDURE — 0TT74ZZ RESECTION OF LEFT URETER, PERCUTANEOUS ENDOSCOPIC APPROACH: ICD-10-PCS | Performed by: UROLOGY

## 2024-02-26 PROCEDURE — 25810000003 LACTATED RINGERS PER 1000 ML: Performed by: UROLOGY

## 2024-02-26 PROCEDURE — 0TT14ZZ RESECTION OF LEFT KIDNEY, PERCUTANEOUS ENDOSCOPIC APPROACH: ICD-10-PCS | Performed by: UROLOGY

## 2024-02-26 PROCEDURE — 25010000002 HYDROMORPHONE PER 4 MG: Performed by: UROLOGY

## 2024-02-26 PROCEDURE — 25010000002 PROPOFOL 200 MG/20ML EMULSION: Performed by: NURSE ANESTHETIST, CERTIFIED REGISTERED

## 2024-02-26 PROCEDURE — 25010000002 DEXAMETHASONE PER 1 MG: Performed by: NURSE ANESTHETIST, CERTIFIED REGISTERED

## 2024-02-26 PROCEDURE — 88307 TISSUE EXAM BY PATHOLOGIST: CPT | Performed by: UROLOGY

## 2024-02-26 PROCEDURE — 25810000003 SODIUM CHLORIDE PER 500 ML: Performed by: UROLOGY

## 2024-02-26 PROCEDURE — 25010000002 BUPIVACAINE 0.25 % SOLUTION: Performed by: UROLOGY

## 2024-02-26 PROCEDURE — 25010000002 MAGNESIUM SULFATE PER 500 MG OF MAGNESIUM: Performed by: NURSE ANESTHETIST, CERTIFIED REGISTERED

## 2024-02-26 PROCEDURE — G0378 HOSPITAL OBSERVATION PER HR: HCPCS

## 2024-02-26 PROCEDURE — 25010000002 MIDAZOLAM PER 1 MG: Performed by: ANESTHESIOLOGY

## 2024-02-26 PROCEDURE — 25010000002 HYDROMORPHONE PER 4 MG: Performed by: NURSE ANESTHETIST, CERTIFIED REGISTERED

## 2024-02-26 PROCEDURE — 88305 TISSUE EXAM BY PATHOLOGIST: CPT | Performed by: UROLOGY

## 2024-02-26 PROCEDURE — 25010000002 FENTANYL CITRATE (PF) 50 MCG/ML SOLUTION: Performed by: NURSE ANESTHETIST, CERTIFIED REGISTERED

## 2024-02-26 PROCEDURE — 25010000002 LEVOFLOXACIN PER 250 MG: Performed by: UROLOGY

## 2024-02-26 PROCEDURE — 25010000002 PHENYLEPHRINE 10 MG/ML SOLUTION: Performed by: NURSE ANESTHETIST, CERTIFIED REGISTERED

## 2024-02-26 PROCEDURE — 25010000002 SUGAMMADEX 200 MG/2ML SOLUTION: Performed by: NURSE ANESTHETIST, CERTIFIED REGISTERED

## 2024-02-26 PROCEDURE — 25010000002 FENTANYL CITRATE (PF) 50 MCG/ML SOLUTION: Performed by: ANESTHESIOLOGY

## 2024-02-26 DEVICE — CARTRDG CLIP LIGAT VASQCLIP 5TO13MM LG STRL: Type: IMPLANTABLE DEVICE | Site: ABDOMEN | Status: FUNCTIONAL

## 2024-02-26 DEVICE — RELOAD ECHELON ENDOPATH GST 45MM WHT: Type: IMPLANTABLE DEVICE | Site: ABDOMEN | Status: FUNCTIONAL

## 2024-02-26 DEVICE — KT SEAL HEMOS ABS FLOSEAL MATRX 1.5/FAST/PREP 5000/IU 10ML: Type: IMPLANTABLE DEVICE | Site: ABDOMEN | Status: FUNCTIONAL

## 2024-02-26 RX ORDER — FAMOTIDINE 10 MG/ML
20 INJECTION, SOLUTION INTRAVENOUS ONCE
Status: COMPLETED | OUTPATIENT
Start: 2024-02-26 | End: 2024-02-26

## 2024-02-26 RX ORDER — HYDROCODONE BITARTRATE AND ACETAMINOPHEN 7.5; 325 MG/1; MG/1
1 TABLET ORAL EVERY 4 HOURS PRN
Status: DISCONTINUED | OUTPATIENT
Start: 2024-02-26 | End: 2024-02-28 | Stop reason: HOSPADM

## 2024-02-26 RX ORDER — KETAMINE HCL IN NACL, ISO-OSM 100MG/10ML
SYRINGE (ML) INJECTION AS NEEDED
Status: DISCONTINUED | OUTPATIENT
Start: 2024-02-26 | End: 2024-02-26 | Stop reason: SURG

## 2024-02-26 RX ORDER — ACETAMINOPHEN 325 MG/1
650 TABLET ORAL EVERY 4 HOURS PRN
Status: DISCONTINUED | OUTPATIENT
Start: 2024-02-26 | End: 2024-02-28 | Stop reason: HOSPADM

## 2024-02-26 RX ORDER — ONDANSETRON 4 MG/1
4 TABLET, ORALLY DISINTEGRATING ORAL EVERY 6 HOURS PRN
Status: DISCONTINUED | OUTPATIENT
Start: 2024-02-26 | End: 2024-02-28 | Stop reason: HOSPADM

## 2024-02-26 RX ORDER — OXYCODONE AND ACETAMINOPHEN 7.5; 325 MG/1; MG/1
1 TABLET ORAL EVERY 4 HOURS PRN
Status: DISCONTINUED | OUTPATIENT
Start: 2024-02-26 | End: 2024-02-26 | Stop reason: HOSPADM

## 2024-02-26 RX ORDER — PROPOFOL 10 MG/ML
INJECTION, EMULSION INTRAVENOUS AS NEEDED
Status: DISCONTINUED | OUTPATIENT
Start: 2024-02-26 | End: 2024-02-26 | Stop reason: SURG

## 2024-02-26 RX ORDER — ONDANSETRON 2 MG/ML
4 INJECTION INTRAMUSCULAR; INTRAVENOUS EVERY 6 HOURS PRN
Status: DISCONTINUED | OUTPATIENT
Start: 2024-02-26 | End: 2024-02-28 | Stop reason: HOSPADM

## 2024-02-26 RX ORDER — DIPHENHYDRAMINE HYDROCHLORIDE 50 MG/ML
12.5 INJECTION INTRAMUSCULAR; INTRAVENOUS
Status: DISCONTINUED | OUTPATIENT
Start: 2024-02-26 | End: 2024-02-26 | Stop reason: HOSPADM

## 2024-02-26 RX ORDER — DOCUSATE SODIUM 100 MG/1
100 CAPSULE, LIQUID FILLED ORAL 2 TIMES DAILY PRN
Status: DISCONTINUED | OUTPATIENT
Start: 2024-02-26 | End: 2024-02-28 | Stop reason: HOSPADM

## 2024-02-26 RX ORDER — SODIUM CHLORIDE 0.9 % (FLUSH) 0.9 %
10 SYRINGE (ML) INJECTION AS NEEDED
Status: DISCONTINUED | OUTPATIENT
Start: 2024-02-26 | End: 2024-02-26 | Stop reason: HOSPADM

## 2024-02-26 RX ORDER — NALOXONE HCL 0.4 MG/ML
0.2 VIAL (ML) INJECTION AS NEEDED
Status: DISCONTINUED | OUTPATIENT
Start: 2024-02-26 | End: 2024-02-26 | Stop reason: HOSPADM

## 2024-02-26 RX ORDER — CYCLOBENZAPRINE HCL 10 MG
5 TABLET ORAL 2 TIMES DAILY PRN
Status: DISCONTINUED | OUTPATIENT
Start: 2024-02-26 | End: 2024-02-28 | Stop reason: HOSPADM

## 2024-02-26 RX ORDER — BUPIVACAINE HYDROCHLORIDE 2.5 MG/ML
INJECTION, SOLUTION INFILTRATION; PERINEURAL AS NEEDED
Status: DISCONTINUED | OUTPATIENT
Start: 2024-02-26 | End: 2024-02-26 | Stop reason: HOSPADM

## 2024-02-26 RX ORDER — LEVOFLOXACIN 5 MG/ML
500 INJECTION, SOLUTION INTRAVENOUS ONCE
Status: COMPLETED | OUTPATIENT
Start: 2024-02-26 | End: 2024-02-26

## 2024-02-26 RX ORDER — EPHEDRINE SULFATE 50 MG/ML
5 INJECTION, SOLUTION INTRAVENOUS ONCE AS NEEDED
Status: DISCONTINUED | OUTPATIENT
Start: 2024-02-26 | End: 2024-02-26 | Stop reason: HOSPADM

## 2024-02-26 RX ORDER — FLUTICASONE PROPIONATE 50 MCG
1 SPRAY, SUSPENSION (ML) NASAL AS NEEDED
Status: DISCONTINUED | OUTPATIENT
Start: 2024-02-26 | End: 2024-02-28 | Stop reason: HOSPADM

## 2024-02-26 RX ORDER — TAMSULOSIN HYDROCHLORIDE 0.4 MG/1
0.4 CAPSULE ORAL 2 TIMES DAILY
Status: DISCONTINUED | OUTPATIENT
Start: 2024-02-26 | End: 2024-02-28 | Stop reason: HOSPADM

## 2024-02-26 RX ORDER — LIDOCAINE HYDROCHLORIDE 10 MG/ML
0.5 INJECTION, SOLUTION INFILTRATION; PERINEURAL ONCE AS NEEDED
Status: DISCONTINUED | OUTPATIENT
Start: 2024-02-26 | End: 2024-02-26 | Stop reason: HOSPADM

## 2024-02-26 RX ORDER — IPRATROPIUM BROMIDE AND ALBUTEROL SULFATE 2.5; .5 MG/3ML; MG/3ML
3 SOLUTION RESPIRATORY (INHALATION) ONCE AS NEEDED
Status: DISCONTINUED | OUTPATIENT
Start: 2024-02-26 | End: 2024-02-26 | Stop reason: HOSPADM

## 2024-02-26 RX ORDER — SODIUM CHLORIDE, SODIUM LACTATE, POTASSIUM CHLORIDE, CALCIUM CHLORIDE 600; 310; 30; 20 MG/100ML; MG/100ML; MG/100ML; MG/100ML
1000 INJECTION, SOLUTION INTRAVENOUS CONTINUOUS
Status: ACTIVE | OUTPATIENT
Start: 2024-02-26 | End: 2024-02-28

## 2024-02-26 RX ORDER — HYDROMORPHONE HYDROCHLORIDE 1 MG/ML
0.5 INJECTION, SOLUTION INTRAMUSCULAR; INTRAVENOUS; SUBCUTANEOUS
Status: DISCONTINUED | OUTPATIENT
Start: 2024-02-26 | End: 2024-02-28 | Stop reason: HOSPADM

## 2024-02-26 RX ORDER — ONDANSETRON 2 MG/ML
4 INJECTION INTRAMUSCULAR; INTRAVENOUS ONCE AS NEEDED
Status: DISCONTINUED | OUTPATIENT
Start: 2024-02-26 | End: 2024-02-26 | Stop reason: HOSPADM

## 2024-02-26 RX ORDER — TRAZODONE HYDROCHLORIDE 100 MG/1
100 TABLET ORAL NIGHTLY
Status: DISCONTINUED | OUTPATIENT
Start: 2024-02-26 | End: 2024-02-28 | Stop reason: HOSPADM

## 2024-02-26 RX ORDER — ZOLPIDEM TARTRATE 5 MG/1
10 TABLET ORAL NIGHTLY PRN
Status: DISCONTINUED | OUTPATIENT
Start: 2024-02-26 | End: 2024-02-28 | Stop reason: HOSPADM

## 2024-02-26 RX ORDER — ACETAMINOPHEN 650 MG/1
650 SUPPOSITORY RECTAL EVERY 4 HOURS PRN
Status: DISCONTINUED | OUTPATIENT
Start: 2024-02-26 | End: 2024-02-28 | Stop reason: HOSPADM

## 2024-02-26 RX ORDER — SODIUM CHLORIDE 0.9 % (FLUSH) 0.9 %
3-10 SYRINGE (ML) INJECTION AS NEEDED
Status: DISCONTINUED | OUTPATIENT
Start: 2024-02-26 | End: 2024-02-26 | Stop reason: HOSPADM

## 2024-02-26 RX ORDER — DEXTROSE MONOHYDRATE, SODIUM CHLORIDE, AND POTASSIUM CHLORIDE 50; 1.49; 4.5 G/1000ML; G/1000ML; G/1000ML
100 INJECTION, SOLUTION INTRAVENOUS CONTINUOUS
Status: DISCONTINUED | OUTPATIENT
Start: 2024-02-26 | End: 2024-02-28 | Stop reason: HOSPADM

## 2024-02-26 RX ORDER — FLUMAZENIL 0.1 MG/ML
0.2 INJECTION INTRAVENOUS AS NEEDED
Status: DISCONTINUED | OUTPATIENT
Start: 2024-02-26 | End: 2024-02-26 | Stop reason: HOSPADM

## 2024-02-26 RX ORDER — METOPROLOL TARTRATE 50 MG/1
50 TABLET, FILM COATED ORAL 2 TIMES DAILY PRN
Status: DISCONTINUED | OUTPATIENT
Start: 2024-02-26 | End: 2024-02-28 | Stop reason: HOSPADM

## 2024-02-26 RX ORDER — DROPERIDOL 2.5 MG/ML
0.62 INJECTION, SOLUTION INTRAMUSCULAR; INTRAVENOUS
Status: DISCONTINUED | OUTPATIENT
Start: 2024-02-26 | End: 2024-02-26 | Stop reason: HOSPADM

## 2024-02-26 RX ORDER — PHENYLEPHRINE HYDROCHLORIDE 10 MG/ML
INJECTION INTRAVENOUS AS NEEDED
Status: DISCONTINUED | OUTPATIENT
Start: 2024-02-26 | End: 2024-02-26 | Stop reason: SURG

## 2024-02-26 RX ORDER — LEVOFLOXACIN 5 MG/ML
750 INJECTION, SOLUTION INTRAVENOUS EVERY 24 HOURS
Qty: 300 ML | Refills: 0 | Status: DISCONTINUED | OUTPATIENT
Start: 2024-02-27 | End: 2024-02-28 | Stop reason: HOSPADM

## 2024-02-26 RX ORDER — FENTANYL CITRATE 50 UG/ML
INJECTION, SOLUTION INTRAMUSCULAR; INTRAVENOUS
Status: COMPLETED
Start: 2024-02-26 | End: 2024-02-26

## 2024-02-26 RX ORDER — SODIUM CHLORIDE 9 MG/ML
INJECTION, SOLUTION INTRAVENOUS AS NEEDED
Status: DISCONTINUED | OUTPATIENT
Start: 2024-02-26 | End: 2024-02-26 | Stop reason: HOSPADM

## 2024-02-26 RX ORDER — FENTANYL CITRATE 50 UG/ML
50 INJECTION, SOLUTION INTRAMUSCULAR; INTRAVENOUS
Status: DISCONTINUED | OUTPATIENT
Start: 2024-02-26 | End: 2024-02-26 | Stop reason: HOSPADM

## 2024-02-26 RX ORDER — PANTOPRAZOLE SODIUM 20 MG/1
20 TABLET, DELAYED RELEASE ORAL DAILY
COMMUNITY

## 2024-02-26 RX ORDER — MAGNESIUM SULFATE HEPTAHYDRATE 500 MG/ML
INJECTION, SOLUTION INTRAMUSCULAR; INTRAVENOUS AS NEEDED
Status: DISCONTINUED | OUTPATIENT
Start: 2024-02-26 | End: 2024-02-26 | Stop reason: SURG

## 2024-02-26 RX ORDER — ONDANSETRON 2 MG/ML
INJECTION INTRAMUSCULAR; INTRAVENOUS AS NEEDED
Status: DISCONTINUED | OUTPATIENT
Start: 2024-02-26 | End: 2024-02-26 | Stop reason: SURG

## 2024-02-26 RX ORDER — MIDAZOLAM HYDROCHLORIDE 1 MG/ML
1 INJECTION INTRAMUSCULAR; INTRAVENOUS
Status: DISCONTINUED | OUTPATIENT
Start: 2024-02-26 | End: 2024-02-26 | Stop reason: HOSPADM

## 2024-02-26 RX ORDER — SODIUM CHLORIDE 0.9 % (FLUSH) 0.9 %
3 SYRINGE (ML) INJECTION EVERY 12 HOURS SCHEDULED
Status: DISCONTINUED | OUTPATIENT
Start: 2024-02-26 | End: 2024-02-26 | Stop reason: HOSPADM

## 2024-02-26 RX ORDER — NALOXONE HCL 0.4 MG/ML
0.1 VIAL (ML) INJECTION
Status: DISCONTINUED | OUTPATIENT
Start: 2024-02-26 | End: 2024-02-28 | Stop reason: HOSPADM

## 2024-02-26 RX ORDER — PROMETHAZINE HYDROCHLORIDE 25 MG/1
25 TABLET ORAL ONCE AS NEEDED
Status: DISCONTINUED | OUTPATIENT
Start: 2024-02-26 | End: 2024-02-26 | Stop reason: HOSPADM

## 2024-02-26 RX ORDER — ACETAMINOPHEN 500 MG
1000 TABLET ORAL ONCE
Status: COMPLETED | OUTPATIENT
Start: 2024-02-26 | End: 2024-02-26

## 2024-02-26 RX ORDER — SODIUM CHLORIDE, SODIUM LACTATE, POTASSIUM CHLORIDE, CALCIUM CHLORIDE 600; 310; 30; 20 MG/100ML; MG/100ML; MG/100ML; MG/100ML
9 INJECTION, SOLUTION INTRAVENOUS CONTINUOUS
Status: DISCONTINUED | OUTPATIENT
Start: 2024-02-26 | End: 2024-02-28 | Stop reason: HOSPADM

## 2024-02-26 RX ORDER — HYDROMORPHONE HYDROCHLORIDE 1 MG/ML
0.5 INJECTION, SOLUTION INTRAMUSCULAR; INTRAVENOUS; SUBCUTANEOUS
Status: DISCONTINUED | OUTPATIENT
Start: 2024-02-26 | End: 2024-02-26 | Stop reason: HOSPADM

## 2024-02-26 RX ORDER — PROMETHAZINE HYDROCHLORIDE 25 MG/1
25 SUPPOSITORY RECTAL ONCE AS NEEDED
Status: DISCONTINUED | OUTPATIENT
Start: 2024-02-26 | End: 2024-02-26 | Stop reason: HOSPADM

## 2024-02-26 RX ORDER — ROCURONIUM BROMIDE 10 MG/ML
INJECTION, SOLUTION INTRAVENOUS AS NEEDED
Status: DISCONTINUED | OUTPATIENT
Start: 2024-02-26 | End: 2024-02-26 | Stop reason: SURG

## 2024-02-26 RX ORDER — LABETALOL HYDROCHLORIDE 5 MG/ML
5 INJECTION, SOLUTION INTRAVENOUS
Status: DISCONTINUED | OUTPATIENT
Start: 2024-02-26 | End: 2024-02-26 | Stop reason: HOSPADM

## 2024-02-26 RX ORDER — HYDROCODONE BITARTRATE AND ACETAMINOPHEN 5; 325 MG/1; MG/1
1 TABLET ORAL ONCE AS NEEDED
Status: DISCONTINUED | OUTPATIENT
Start: 2024-02-26 | End: 2024-02-26 | Stop reason: HOSPADM

## 2024-02-26 RX ORDER — CEFAZOLIN SODIUM 2 G/100ML
2000 INJECTION, SOLUTION INTRAVENOUS ONCE
Status: DISCONTINUED | OUTPATIENT
Start: 2024-02-26 | End: 2024-02-28 | Stop reason: HOSPADM

## 2024-02-26 RX ORDER — DEXAMETHASONE SODIUM PHOSPHATE 4 MG/ML
INJECTION, SOLUTION INTRA-ARTICULAR; INTRALESIONAL; INTRAMUSCULAR; INTRAVENOUS; SOFT TISSUE AS NEEDED
Status: DISCONTINUED | OUTPATIENT
Start: 2024-02-26 | End: 2024-02-26 | Stop reason: SURG

## 2024-02-26 RX ORDER — HYDRALAZINE HYDROCHLORIDE 20 MG/ML
5 INJECTION INTRAMUSCULAR; INTRAVENOUS
Status: DISCONTINUED | OUTPATIENT
Start: 2024-02-26 | End: 2024-02-26 | Stop reason: HOSPADM

## 2024-02-26 RX ORDER — FENTANYL CITRATE 50 UG/ML
50 INJECTION, SOLUTION INTRAMUSCULAR; INTRAVENOUS ONCE AS NEEDED
Status: COMPLETED | OUTPATIENT
Start: 2024-02-26 | End: 2024-02-26

## 2024-02-26 RX ADMIN — SUGAMMADEX 200 MG: 100 INJECTION, SOLUTION INTRAVENOUS at 17:24

## 2024-02-26 RX ADMIN — Medication 30 MG: at 15:33

## 2024-02-26 RX ADMIN — PHENYLEPHRINE HYDROCHLORIDE 200 MCG: 10 INJECTION INTRAVENOUS at 15:53

## 2024-02-26 RX ADMIN — LEVOFLOXACIN 500 MG: 5 INJECTION, SOLUTION INTRAVENOUS at 14:41

## 2024-02-26 RX ADMIN — HYDROMORPHONE HYDROCHLORIDE 0.5 MG: 1 INJECTION, SOLUTION INTRAMUSCULAR; INTRAVENOUS; SUBCUTANEOUS at 15:28

## 2024-02-26 RX ADMIN — ACETAMINOPHEN 1000 MG: 500 TABLET ORAL at 14:14

## 2024-02-26 RX ADMIN — ROCURONIUM BROMIDE 50 MG: 10 INJECTION, SOLUTION INTRAVENOUS at 15:04

## 2024-02-26 RX ADMIN — PROPOFOL 200 MG: 10 INJECTION, EMULSION INTRAVENOUS at 15:02

## 2024-02-26 RX ADMIN — Medication 10 MG: at 17:01

## 2024-02-26 RX ADMIN — MIDAZOLAM 1 MG: 1 INJECTION INTRAMUSCULAR; INTRAVENOUS at 14:15

## 2024-02-26 RX ADMIN — SODIUM CHLORIDE, POTASSIUM CHLORIDE, SODIUM LACTATE AND CALCIUM CHLORIDE 1000 ML: 600; 310; 30; 20 INJECTION, SOLUTION INTRAVENOUS at 14:16

## 2024-02-26 RX ADMIN — HYDROMORPHONE HYDROCHLORIDE 0.5 MG: 1 INJECTION, SOLUTION INTRAMUSCULAR; INTRAVENOUS; SUBCUTANEOUS at 18:44

## 2024-02-26 RX ADMIN — HYDROMORPHONE HYDROCHLORIDE 0.5 MG: 1 INJECTION, SOLUTION INTRAMUSCULAR; INTRAVENOUS; SUBCUTANEOUS at 20:45

## 2024-02-26 RX ADMIN — ROCURONIUM BROMIDE 20 MG: 10 INJECTION, SOLUTION INTRAVENOUS at 15:53

## 2024-02-26 RX ADMIN — PHENYLEPHRINE HYDROCHLORIDE 200 MCG: 10 INJECTION INTRAVENOUS at 16:15

## 2024-02-26 RX ADMIN — DEXAMETHASONE SODIUM PHOSPHATE 10 MG: 4 INJECTION, SOLUTION INTRA-ARTICULAR; INTRALESIONAL; INTRAMUSCULAR; INTRAVENOUS; SOFT TISSUE at 15:12

## 2024-02-26 RX ADMIN — FAMOTIDINE 20 MG: 10 INJECTION INTRAVENOUS at 14:14

## 2024-02-26 RX ADMIN — OXYCODONE HYDROCHLORIDE AND ACETAMINOPHEN 1 TABLET: 7.5; 325 TABLET ORAL at 18:06

## 2024-02-26 RX ADMIN — Medication 10 MG: at 16:30

## 2024-02-26 RX ADMIN — POTASSIUM CHLORIDE, DEXTROSE MONOHYDRATE AND SODIUM CHLORIDE 100 ML/HR: 150; 5; 450 INJECTION, SOLUTION INTRAVENOUS at 20:47

## 2024-02-26 RX ADMIN — FENTANYL CITRATE 50 MCG: 50 INJECTION, SOLUTION INTRAMUSCULAR; INTRAVENOUS at 18:17

## 2024-02-26 RX ADMIN — SODIUM CHLORIDE, POTASSIUM CHLORIDE, SODIUM LACTATE AND CALCIUM CHLORIDE: 600; 310; 30; 20 INJECTION, SOLUTION INTRAVENOUS at 16:29

## 2024-02-26 RX ADMIN — HYDROMORPHONE HYDROCHLORIDE 0.5 MG: 1 INJECTION, SOLUTION INTRAMUSCULAR; INTRAVENOUS; SUBCUTANEOUS at 18:00

## 2024-02-26 RX ADMIN — ONDANSETRON 4 MG: 2 INJECTION INTRAMUSCULAR; INTRAVENOUS at 17:01

## 2024-02-26 RX ADMIN — CYCLOBENZAPRINE 5 MG: 10 TABLET, FILM COATED ORAL at 18:06

## 2024-02-26 RX ADMIN — SODIUM CHLORIDE, POTASSIUM CHLORIDE, SODIUM LACTATE AND CALCIUM CHLORIDE 9 ML/HR: 600; 310; 30; 20 INJECTION, SOLUTION INTRAVENOUS at 14:15

## 2024-02-26 RX ADMIN — FENTANYL CITRATE 50 MCG: 50 INJECTION, SOLUTION INTRAMUSCULAR; INTRAVENOUS at 17:37

## 2024-02-26 RX ADMIN — HYDROMORPHONE HYDROCHLORIDE 0.5 MG: 1 INJECTION, SOLUTION INTRAMUSCULAR; INTRAVENOUS; SUBCUTANEOUS at 17:48

## 2024-02-26 RX ADMIN — MAGNESIUM SULFATE HEPTAHYDRATE 2 G: 500 INJECTION, SOLUTION INTRAMUSCULAR; INTRAVENOUS at 15:41

## 2024-02-26 RX ADMIN — PHENYLEPHRINE HYDROCHLORIDE 100 MCG: 10 INJECTION INTRAVENOUS at 15:48

## 2024-02-26 RX ADMIN — FENTANYL CITRATE 50 MCG: 50 INJECTION, SOLUTION INTRAMUSCULAR; INTRAVENOUS at 15:20

## 2024-02-26 NOTE — OP NOTE
PREOPERATIVE DIAGNOSIS: Left ureteral mass atrophic left kidney    POSTOPERATIVE DIAGNOSIS: Same    PROCEDURE: Robotic left nephro ureterectomy    SURGEON:  Akira Barnett MD    ASSISTANT: Zoila Castrejon    ANESTHESIA: General    EBL: 100 cc    DRAINS: 19 Grenadian Saturnino drain, 16 Grenadian Madrigal catheter    COMPLICATIONS: None    FINDINGS: Atrophic left kidney, purulence distal ureter    INDICATIONS FOR PROCEDURE: History of left ureteral mass with atrophic left kidney.  Patient presents today for robotic left nephro ureterectomy.  Risk and benefits were explained include but not limited to bleeding, infection, damage to adjacent vessels, structures and nerves.  Recurrence of disease multiple procedures were discussed.  Patient consented to the procedure    DESCRIPTION OF PROCEDURE: After receiving antibiotics he was taken to the operative suite placed in supine position the operating table.  He underwent a general anesthesia.  After adequate anesthesia was obtained a 16 Grenadian Madrigal catheter was placed.  10 cc were placed in balloon Madrigal bag was placed.  He was then placed in right lateral decubitus position with left side up.  Axillary roll was placed.  Flex was placed into the table and he was then taped to the table.  All his pressure points were padded accordingly.  His abdomen and left flank were then prepped and draped in a sterile fashion.  I made a 8 mm incision 2 fingerbreadths lateral to the umbilicus on the left side.  Towel clips were placed in the side of the incision and the abdominal wall lifted anteriorly.  A Veress needle was placed into the abdomen and a saline test was performed which was negative.  I turned on CO2 gas and pneumoperitoneum was achieved.  Using the Optiview I placed an 8 mm port to the abdomen.  There is no vessel bowel injury.  No scarring or adhesions were noted.  Using visual guidance I placed 8 mm ports from just under the ribs 4 fingerbreadths inferior and then a final 8 mm port was  placed 4 fingerbreadths inferior to the umbilicus in the midline.  A 12 mm port was then placed using visual guidance 4 fingerbreadths superior to the umbilicus in the midline.  The robot was then docked after the table was dropped.    I went to the console and using 0 degree lens I incised the white line of Toldt and reflected the mesocolon medially.  I was then able to identify the ureter and I traced it over the vessels down into the pelvis.  I did take some of the bowel down that was mildly adhesed to the lateral wall.  I got down to the area of the bladder appeared to be some purulence that was irrigated.  I was able to place a Heema lock as distal as possible on the ureter.  When I got to the edge of the bladder was able to transected and then I used a 4-0 Vicryl to close closed the bladder in a running fashion.  The area was irrigated copiously because of the what appeared to be purulence.  I then dissected the ureter and superiorly as far as possible.  I then switched the camera port where it was in the second port.  I then followed the ureter superiorly.  Again the kidney was extremely atrophic may be the size of 2 to 3 cm with no noticeable tissue on CT.  I was able to care the ureter up to appear to be the hilum.  I used a 45 mm vascular stapler to transect what appeared to be the hilum.  Just a few small vessels.  I was then able to from there to dissect what was remaining of the kidney away from the perirenal fat and it was released.  The ureter and the right remnant of the kidney were then placed into an Endo Catch bag.  Tisseel was then placed into the gutter where the ureter was removed and at the hilum.  I dropped the gas pressure down to 5 cmH2O no active bleeding was noted.  A 19 Yi Saturnino drain was placed to the fourth arm and sewed with 2-0 silk.    The gas was turned off robot was then undocked all the ports were removed.  I increased the supraumbilical fascial incision to roughly 2 cm with  cautery.  I remove the kidney remnant and ureter and passed off table specimen.  I then closed the supraumbilical fascia incision with a running 0 Vicryl suture.  Quarter percent Marcaine with out epinephrine was used for local anesthetic.  All skin incisions were closed with 4-0 Monocryl subcu running sutures.  Dermabond was placed.  Dressing sponge and paper tape were placed to RIA site.  He was then extubated taken recovery in satisfactory condition.  He tolerated the procedure well.  All instrument needle counts were correct.

## 2024-02-26 NOTE — H&P
FIRST UROLOGY CONSULT      Patient Identification:  NAME:  Sawyer Moraes  Age:  63 y.o.   Sex:  male   :  1960   MRN:  [id number]     Chief complaint: Ureteral mass.      History of present illness:      H/o L ureteral mass atrophic L kidney. For Robotic left nephroureterectomy.      Past medical history:  Past Medical History:   Diagnosis Date    Asthma     about 6 months ago during covid had issues does not use inhaler    Saavedra esophagus     Cancer     skin cancers to back    Cervical disc disorder     Had epidural injections which helped    Cholelithiasis Removed     Colon polyp Benign- removed    Diarrhea     GERD (gastroesophageal reflux disease)     Hypertension     Kidney trouble     only has one kidney    Knee sprain     Treated at [facility] for knee sprain    Knee swelling     Saw Dr. Rivas Patton for knee problem.    Low back strain     Lumbosacral disc disease  herniated disk    Epdural helped. Take oxycodone for pain    Periarthritis of shoulder     Rotator cuff syndrome     Was seeing [facility] Patton ortho, have been taking Cortisone shots which help. PT. at [facility] for 6 months helped. Pain is returning.    Scoliosis 2010    Sleep apnea     uses CPAP    Tendinitis of knee     Ureteral mass     Urgency of urination     frequency       Past surgical history:  Past Surgical History:   Procedure Laterality Date    COLONOSCOPY      CYSTOSCOPY RETROGRADE PYELOGRAM Right 2023    Procedure: CYSTOSCOPY BILATERAL  RETROGRADES WITH PYELOGRAM;  Surgeon: Vince Hughes MD;  Location: [facility];  Service: Urology;  Laterality: Right;    ENDOSCOPY N/A 2023    Procedure: ESOPHAGOGASTRODUODENOSCOPY with 15-18mm Balloon;  Surgeon: Quincy Tong MD;  Location: [facility] OR;  Service: Gastroenterology;  Laterality: N/A;  Ulcerative Esophagitis, duodenitis, Gastritis, Esophageal Stricture    LAPAROSCOPIC CHOLECYSTECTOMY      UPPER  GASTROINTESTINAL ENDOSCOPY  2008       Allergies:  Aspirin, Ibuprofen, and Nsaids    Home medications:  Medications Prior to Admission   Medication Sig Dispense Refill Last Dose    cyclobenzaprine (FLEXERIL) 5 MG tablet Take 1 tablet by mouth 2 (Two) Times a Day As Needed.   2/25/2024 at 2000    folic acid (FOLVITE) 1 MG tablet Take 1 tablet by mouth Daily. HOLD FOR SURGERY   Past Week    hydrOXYzine (ATARAX) 10 MG tablet Take 1 tablet by mouth 3 (Three) Times a Day As Needed for Itching.   Past Month    metoprolol tartrate (LOPRESSOR) 25 MG tablet Take 2 tablets by mouth 2 (Two) Times a Day As Needed (BLOOD PRESSURE).   Past Month    Sildenafil Citrate (VIAGRA PO) Take  by mouth As Needed (ED). HOLD 48 HOURS PRIOR TO SURGERY   Past Month    tamsulosin (FLOMAX) 0.4 MG capsule 24 hr capsule Take 1 capsule by mouth 2 (Two) Times a Day.   2/26/2024 at 0800    Vonoprazan Fumarate (Voquezna) 20 MG tablet Take 1 tablet by mouth Daily. 30 tablet 1 Past Week    zolpidem (AMBIEN) 10 MG tablet 1 tablet At Night As Needed.   2/25/2024 at 2000    fluticasone (FLONASE) 50 MCG/ACT nasal spray 1 spray by Each Nare route As Needed.   More than a month    Gabapentin 10 %, Ketamine HCl 4 % Apply 1-2 g topically to the appropriate area as directed 3 (Three) to 4 (Four) times daily. 90 g 5 2/22/2024    modafinil (PROVIGIL) 200 MG tablet Take 1 tablet by mouth As Needed (ED). HOLD 48 HOURS PRIOR TO SURGERY   2/21/2024    NON FORMULARY Kratom- HOLD PRIOR TO SURGERY   2/18/2024    pantoprazole (PROTONIX) 20 MG EC tablet Take 1 tablet by mouth Daily.       sucralfate (CARAFATE) 1 g tablet Take 1 tablet by mouth As Needed.   2/19/2024    traZODone (DESYREL) 50 MG tablet Take 2 tablets by mouth every night at bedtime.   2/24/2024        Hospital medications:  ceFAZolin, 2,000 mg, Intravenous, Once  levoFLOXacin, 500 mg, Intravenous, Once  sodium chloride, 3 mL, Intravenous, Q12H      lactated ringers, 1,000 mL, Last Rate: 1,000 mL (02/26/24  1416)  lactated ringers, 9 mL/hr, Last Rate: 9 mL/hr (24 1415)        fentanyl    lidocaine    midazolam    sodium chloride    sodium chloride    Family history:  Family History   Problem Relation Age of Onset    Diabetes Father     Heart disease Father     Malig Hyperthermia Neg Hx        Social history:  Social History     Tobacco Use    Smoking status: Never     Passive exposure: Never    Smokeless tobacco: Never   Vaping Use    Vaping Use: Never used   Substance Use Topics    Alcohol use: Not Currently    Drug use: Not Currently     Types: Oxycodone     Comment: Was in treatment for opiod addiction       Review of systems:      Positive for:  Ureteral mass.    Negative for:  chest pain, cough, sob, o/w neg    Objective:  TMax 24 hours:   Temp (24hrs), Av.4 °F (36.9 °C), Min:98.4 °F (36.9 °C), Max:98.4 °F (36.9 °C)      Vitals Ranges:   Temp:  [98.4 °F (36.9 °C)] 98.4 °F (36.9 °C)  Heart Rate:  [87] 87  Resp:  [16] 16  BP: (150)/(89) 150/89    Intake/Output Last 3 shifts:  No intake/output data recorded.     Physical Exam:    General Appearance:    Alert, cooperative, NAD   Back:     No CVA tenderness   Lungs:     Respirations unlabored, no wheezing    Heart:    RRR, intact peripheral pulses   Abdomen:     Soft, NDNT, no masses, no guarding   Neuro/Psych:   Orientation intact, mood/affect pleasant       Results review:   I reviewed the patient's new clinical results.    Data review:  Lab Results (last 24 hours)       ** No results found for the last 24 hours. **             Imaging:  Imaging Results (Last 24 Hours)       ** No results found for the last 24 hours. **               Assessment:     Left ureteral mass.  Atrophic L kidney.      Plan:     Robotic Left nephroureterectomy.  R/B d/w pt.      Akira Barnett MD  24  14:43 EST

## 2024-02-26 NOTE — ANESTHESIA PREPROCEDURE EVALUATION
Anesthesia Evaluation     Patient summary reviewed and Nursing notes reviewed   NPO Solid Status: > 6 hours  NPO Liquid Status: > 2 hours           Airway   Mallampati: III  TM distance: >3 FB  Neck ROM: full  Dental - normal exam     Pulmonary    (+) asthma,sleep apnea  (-) COPD, not a smoker  Cardiovascular   Exercise tolerance: good (4-7 METS)    ECG reviewed    (+) hypertension  (-) past MI, dysrhythmias, angina      Neuro/Psych  (-) seizures, CVA  GI/Hepatic/Renal/Endo    (+) obesity, GERD well controlled, renal disease (L kidney is atrophic, but L ureteral mass.)-  (-) liver disease, diabetes, no thyroid disorder    Musculoskeletal     Abdominal    Substance History       Comment: Prior hx opioid addiction. Not requiring maintenance therapy. Does still take vicodin for back pain sometimes.    OB/GYN          Other                          Anesthesia Plan    ASA 3     general       Anesthetic plan, risks, benefits, and alternatives have been provided, discussed and informed consent has been obtained with: patient.        CODE STATUS:

## 2024-02-26 NOTE — ANESTHESIA PROCEDURE NOTES
Airway  Urgency: elective    Date/Time: 2/26/2024 3:05 PM  Airway not difficult    General Information and Staff    Patient location during procedure: OR  CRNA/CAA: Sawyer Barnard, [place]    Indications and Patient Condition  Indications for airway management: airway protection    Preoxygenated: yes  MILS maintained throughout  Mask difficulty assessment: 2 - vent by mask + OA or adjuvant +/- NMBA    Final Airway Details  Final airway type: endotracheal airway      Successful airway: ETT  Cuffed: yes   Successful intubation technique: direct laryngoscopy  Facilitating devices/methods: intubating stylet  Endotracheal tube insertion site: oral  Blade: Aki  Blade size: 4  ETT size (mm): 7.5  Cormack-Lehane Classification: grade IIb - view of arytenoids or posterior of glottis only  Placement verified by: chest auscultation and capnometry   Measured from: gums  ETT/EBT to gums (cm): 24  Number of attempts at approach: 1  Assessment: lips, teeth, and gum same as pre-op and atraumatic intubation

## 2024-02-27 PROBLEM — N28.89 RENAL MASS: Status: ACTIVE | Noted: 2024-02-27

## 2024-02-27 LAB
ANION GAP SERPL CALCULATED.3IONS-SCNC: 6 MMOL/L (ref 5–15)
BASOPHILS # BLD AUTO: 0.02 10*3/MM3 (ref 0–0.2)
BASOPHILS NFR BLD AUTO: 0.2 % (ref 0–1.5)
BUN SERPL-MCNC: 12 MG/DL (ref 8–23)
BUN/CREAT SERPL: 10 (ref 7–25)
CALCIUM SPEC-SCNC: 8.5 MG/DL (ref 8.6–10.5)
CHLORIDE SERPL-SCNC: 108 MMOL/L (ref 98–107)
CO2 SERPL-SCNC: 27 MMOL/L (ref 22–29)
CREAT SERPL-MCNC: 1.2 MG/DL (ref 0.76–1.27)
DEPRECATED RDW RBC AUTO: 42.2 FL (ref 37–54)
EGFRCR SERPLBLD CKD-EPI 2021: 68.4 ML/MIN/1.73
EOSINOPHIL # BLD AUTO: 0 10*3/MM3 (ref 0–0.4)
EOSINOPHIL NFR BLD AUTO: 0 % (ref 0.3–6.2)
ERYTHROCYTE [DISTWIDTH] IN BLOOD BY AUTOMATED COUNT: 12.9 % (ref 12.3–15.4)
GLUCOSE SERPL-MCNC: 150 MG/DL (ref 65–99)
HCT VFR BLD AUTO: 41.9 % (ref 37.5–51)
HGB BLD-MCNC: 13.6 G/DL (ref 13–17.7)
IMM GRANULOCYTES # BLD AUTO: 0.04 10*3/MM3 (ref 0–0.05)
IMM GRANULOCYTES NFR BLD AUTO: 0.5 % (ref 0–0.5)
LYMPHOCYTES # BLD AUTO: 0.71 10*3/MM3 (ref 0.7–3.1)
LYMPHOCYTES NFR BLD AUTO: 8.2 % (ref 19.6–45.3)
MCH RBC QN AUTO: 28.8 PG (ref 26.6–33)
MCHC RBC AUTO-ENTMCNC: 32.5 G/DL (ref 31.5–35.7)
MCV RBC AUTO: 88.8 FL (ref 79–97)
MONOCYTES # BLD AUTO: 0.6 10*3/MM3 (ref 0.1–0.9)
MONOCYTES NFR BLD AUTO: 7 % (ref 5–12)
NEUTROPHILS NFR BLD AUTO: 7.26 10*3/MM3 (ref 1.7–7)
NEUTROPHILS NFR BLD AUTO: 84.1 % (ref 42.7–76)
NRBC BLD AUTO-RTO: 0 /100 WBC (ref 0–0.2)
PLATELET # BLD AUTO: 186 10*3/MM3 (ref 140–450)
PMV BLD AUTO: 9.6 FL (ref 6–12)
POTASSIUM SERPL-SCNC: 4.7 MMOL/L (ref 3.5–5.2)
RBC # BLD AUTO: 4.72 10*6/MM3 (ref 4.14–5.8)
SODIUM SERPL-SCNC: 141 MMOL/L (ref 136–145)
WBC NRBC COR # BLD AUTO: 8.63 10*3/MM3 (ref 3.4–10.8)

## 2024-02-27 PROCEDURE — 80048 BASIC METABOLIC PNL TOTAL CA: CPT | Performed by: UROLOGY

## 2024-02-27 PROCEDURE — 25010000002 LEVOFLOXACIN PER 250 MG: Performed by: UROLOGY

## 2024-02-27 PROCEDURE — 85025 COMPLETE CBC W/AUTO DIFF WBC: CPT | Performed by: UROLOGY

## 2024-02-27 PROCEDURE — 25010000002 HYDROMORPHONE PER 4 MG: Performed by: UROLOGY

## 2024-02-27 RX ORDER — BISACODYL 10 MG
10 SUPPOSITORY, RECTAL RECTAL DAILY
Status: DISCONTINUED | OUTPATIENT
Start: 2024-02-27 | End: 2024-02-28 | Stop reason: HOSPADM

## 2024-02-27 RX ADMIN — HYDROCODONE BITARTRATE AND ACETAMINOPHEN 1 TABLET: 7.5; 325 TABLET ORAL at 09:23

## 2024-02-27 RX ADMIN — HYDROCODONE BITARTRATE AND ACETAMINOPHEN 1 TABLET: 7.5; 325 TABLET ORAL at 14:12

## 2024-02-27 RX ADMIN — BISACODYL 10 MG: 10 SUPPOSITORY RECTAL at 20:15

## 2024-02-27 RX ADMIN — TRAZODONE HYDROCHLORIDE 100 MG: 100 TABLET ORAL at 20:15

## 2024-02-27 RX ADMIN — HYDROMORPHONE HYDROCHLORIDE 0.5 MG: 1 INJECTION, SOLUTION INTRAMUSCULAR; INTRAVENOUS; SUBCUTANEOUS at 21:54

## 2024-02-27 RX ADMIN — POTASSIUM CHLORIDE, DEXTROSE MONOHYDRATE AND SODIUM CHLORIDE 100 ML/HR: 150; 5; 450 INJECTION, SOLUTION INTRAVENOUS at 16:58

## 2024-02-27 RX ADMIN — HYDROMORPHONE HYDROCHLORIDE 0.5 MG: 1 INJECTION, SOLUTION INTRAMUSCULAR; INTRAVENOUS; SUBCUTANEOUS at 00:45

## 2024-02-27 RX ADMIN — TAMSULOSIN HYDROCHLORIDE 0.4 MG: 0.4 CAPSULE ORAL at 09:23

## 2024-02-27 RX ADMIN — LEVOFLOXACIN 750 MG: 5 INJECTION, SOLUTION INTRAVENOUS at 14:12

## 2024-02-27 RX ADMIN — HYDROMORPHONE HYDROCHLORIDE 0.5 MG: 1 INJECTION, SOLUTION INTRAMUSCULAR; INTRAVENOUS; SUBCUTANEOUS at 04:07

## 2024-02-27 RX ADMIN — HYDROMORPHONE HYDROCHLORIDE 0.5 MG: 1 INJECTION, SOLUTION INTRAMUSCULAR; INTRAVENOUS; SUBCUTANEOUS at 12:51

## 2024-02-27 RX ADMIN — POTASSIUM CHLORIDE, DEXTROSE MONOHYDRATE AND SODIUM CHLORIDE 100 ML/HR: 150; 5; 450 INJECTION, SOLUTION INTRAVENOUS at 05:14

## 2024-02-27 RX ADMIN — TAMSULOSIN HYDROCHLORIDE 0.4 MG: 0.4 CAPSULE ORAL at 20:15

## 2024-02-27 RX ADMIN — HYDROMORPHONE HYDROCHLORIDE 0.5 MG: 1 INJECTION, SOLUTION INTRAMUSCULAR; INTRAVENOUS; SUBCUTANEOUS at 10:34

## 2024-02-27 RX ADMIN — HYDROCODONE BITARTRATE AND ACETAMINOPHEN 1 TABLET: 7.5; 325 TABLET ORAL at 18:54

## 2024-02-27 RX ADMIN — HYDROCODONE BITARTRATE AND ACETAMINOPHEN 1 TABLET: 7.5; 325 TABLET ORAL at 05:14

## 2024-02-27 RX ADMIN — HYDROMORPHONE HYDROCHLORIDE 0.5 MG: 1 INJECTION, SOLUTION INTRAMUSCULAR; INTRAVENOUS; SUBCUTANEOUS at 15:03

## 2024-02-27 RX ADMIN — HYDROMORPHONE HYDROCHLORIDE 0.5 MG: 1 INJECTION, SOLUTION INTRAMUSCULAR; INTRAVENOUS; SUBCUTANEOUS at 07:26

## 2024-02-27 RX ADMIN — HYDROMORPHONE HYDROCHLORIDE 0.5 MG: 1 INJECTION, SOLUTION INTRAMUSCULAR; INTRAVENOUS; SUBCUTANEOUS at 17:34

## 2024-02-27 NOTE — PLAN OF CARE
Goal Outcome Evaluation:  Plan of Care Reviewed With: patient        Progress: improving     Pt's AxOx4, calm and cooperative. On RA during the day, wears 2 L NC at night for sleep, C-PAP at home. Pt takes pills whole with water, see MAR. IVF at 100 ml/hr. PRN Dilaudid and Norco given for pain control. Pt encouraged to ambulate in hallway multiple times. Assist x1 to the bathroom. ABD lap sites, clean and dry, PEDRO. RIA drain output- bloody. Madrigal care done, urine clear yellow. SCDs on this morning. Pt up in the recliner most of the afternoon. Tolerating diet. Significant other visited this afternoon. Plan of care ongoing.

## 2024-02-27 NOTE — PLAN OF CARE
Goal Outcome Evaluation:  Plan of Care Reviewed With: patient           Outcome Evaluation: Patient A&OX4. Pain managed with PO and IV PRN medications. Madrigal catheter remain patent and draining. IV fluids infusing.

## 2024-02-27 NOTE — NURSING NOTE
Patient arrived to room P388 from PACU at this time with all belongings. Patient A&OX4. VSS. Patient c/o 8/10 pain- waiting for pharmacy to verify floor medications and this RN will give when available. Patient ambulated from stretcher to bed with stand-by assist. Madrigal catheter remains patent. Patient oriented to room and call light. Patient denies further needs at this time.      Vitals:    02/26/24 2011   BP: 162/88   Pulse: 85   Resp: 18   Temp: 98.2 °F (36.8 °C)   SpO2: 98%

## 2024-02-27 NOTE — PROGRESS NOTES
AFVSS  POD#1  C/o gas pain.  Urine clear.    Abd soft, incisions C/D/I.    A/p - s/p Robotic L NU.  ADAT.  Ambulate.  Dulcolax.

## 2024-02-27 NOTE — ANESTHESIA POSTPROCEDURE EVALUATION
"Patient: Sawyer CRUMP Tors    Procedure Summary       Date: 02/26/24 Room / Location: [facility] OR [address] [place] [address] MAIN OR    Anesthesia Start: 1453 Anesthesia Stop: 1740    Procedure: ROBOTIC LEFT NEPHROURETERECTOMY (Left: Abdomen) Diagnosis:     Surgeons: Akira Barnett MD Provider: Nasir Rutherford MD    Anesthesia Type: general ASA Status: 3            Anesthesia Type: general    Vitals  Vitals Value Taken Time   /88 02/26/24 1915   Temp 36.4 °C (97.6 °F) 02/26/24 1737   Pulse 105 02/26/24 1921   Resp 15 02/26/24 1737   SpO2 98 % 02/26/24 1921   Vitals shown include unfiled device data.        Post Anesthesia Care and Evaluation    Patient location during evaluation: PACU  Patient participation: complete - patient participated  Level of consciousness: awake and alert  Pain management: adequate    Airway patency: patent  Anesthetic complications: No anesthetic complications  PONV Status: controlled  Cardiovascular status: acceptable and hemodynamically stable  Respiratory status: acceptable  Hydration status: acceptable    Comments: /88   Pulse 84   Temp 36.4 °C (97.6 °F) (Oral)   Resp 15   Ht 182.9 cm (72\")   SpO2 90%   BMI 34.90 kg/m²     "

## 2024-02-28 VITALS
DIASTOLIC BLOOD PRESSURE: 82 MMHG | TEMPERATURE: 98 F | SYSTOLIC BLOOD PRESSURE: 135 MMHG | BODY MASS INDEX: 33.86 KG/M2 | HEIGHT: 72 IN | OXYGEN SATURATION: 96 % | WEIGHT: 250 LBS | RESPIRATION RATE: 16 BRPM | HEART RATE: 82 BPM

## 2024-02-28 LAB
LAB AP CASE REPORT: NORMAL
PATH REPORT.FINAL DX SPEC: NORMAL
PATH REPORT.GROSS SPEC: NORMAL

## 2024-02-28 PROCEDURE — 25010000002 HYDROMORPHONE PER 4 MG: Performed by: UROLOGY

## 2024-02-28 RX ORDER — DOCUSATE SODIUM 100 MG/1
100 CAPSULE, LIQUID FILLED ORAL 2 TIMES DAILY
Qty: 40 CAPSULE | Refills: 0 | Status: SHIPPED | OUTPATIENT
Start: 2024-02-28

## 2024-02-28 RX ORDER — HYDROCODONE BITARTRATE AND ACETAMINOPHEN 5; 325 MG/1; MG/1
1 TABLET ORAL EVERY 6 HOURS PRN
Qty: 30 TABLET | Refills: 0 | Status: SHIPPED | OUTPATIENT
Start: 2024-02-28

## 2024-02-28 RX ORDER — SULFAMETHOXAZOLE AND TRIMETHOPRIM 400; 80 MG/1; MG/1
1 TABLET ORAL DAILY
Qty: 10 TABLET | Refills: 0 | Status: SHIPPED | OUTPATIENT
Start: 2024-02-28 | End: 2024-03-09

## 2024-02-28 RX ADMIN — TAMSULOSIN HYDROCHLORIDE 0.4 MG: 0.4 CAPSULE ORAL at 08:36

## 2024-02-28 RX ADMIN — HYDROMORPHONE HYDROCHLORIDE 0.5 MG: 1 INJECTION, SOLUTION INTRAMUSCULAR; INTRAVENOUS; SUBCUTANEOUS at 04:58

## 2024-02-28 RX ADMIN — HYDROMORPHONE HYDROCHLORIDE 0.5 MG: 1 INJECTION, SOLUTION INTRAMUSCULAR; INTRAVENOUS; SUBCUTANEOUS at 08:36

## 2024-02-28 RX ADMIN — HYDROMORPHONE HYDROCHLORIDE 0.5 MG: 1 INJECTION, SOLUTION INTRAMUSCULAR; INTRAVENOUS; SUBCUTANEOUS at 00:07

## 2024-02-28 RX ADMIN — POTASSIUM CHLORIDE, DEXTROSE MONOHYDRATE AND SODIUM CHLORIDE 100 ML/HR: 150; 5; 450 INJECTION, SOLUTION INTRAVENOUS at 04:39

## 2024-02-28 RX ADMIN — HYDROCODONE BITARTRATE AND ACETAMINOPHEN 1 TABLET: 7.5; 325 TABLET ORAL at 10:58

## 2024-02-28 RX ADMIN — HYDROCODONE BITARTRATE AND ACETAMINOPHEN 1 TABLET: 7.5; 325 TABLET ORAL at 02:32

## 2024-02-28 NOTE — PROGRESS NOTES
AFVSS  POD#2  + BM.  Pain controlled.    A/p - s/p Robotic L NU.  D/c RIA drain.  Regular diet.  Leg bag.  D/c to home.  F/u 5 days swan catheter removal.

## 2024-02-28 NOTE — PLAN OF CARE
Goal Outcome Evaluation:            Patient is being discharged to home.  IV removed intact and without distress.  RIA removed intact with minimal distress.    Patient and wife shown how to attach the new leg bag as well as how to apply a new swan stat lock.  Both agree that they can do the two tasks demonstrated.

## 2024-02-28 NOTE — PLAN OF CARE
Goal Outcome Evaluation:                   Patient is alert and oriented x 4, vitals stable, room air during the day but 2L at night due to sleep apnea without his CPAP, ambulated in halls this shift with standby assist, IVF infusing, complaints of pain, see MAR for all medications administered, safety precautions in use, plan of care ongoing.

## 2024-02-28 NOTE — PAYOR COMM NOTE
"Alexi Moraes \"Dorian\" (63 y.o. Male)          DC SUMMARY FOR [id number]        Date of Birth   1960    Social Security Number       Address   [address] [address]    Home Phone   [phone]    MRN   [id number]       Anabaptist   Orthodoxy    Marital Status                               Admission Date   2/26/24    Admission Type   Elective    Admitting Provider   Akira Barnett MD    Attending Provider       Department, Room/Bed   [address], P388/1       Discharge Date   2/28/2024    Discharge Disposition   Home or Self Care    Discharge Destination                                 Attending Provider: (none)   Allergies: Aspirin, Ibuprofen, Nsaids    Isolation: None   Infection: None   Code Status: CPR    Ht: 182.9 cm (72.01\")   Wt: 113 kg (250 lb)    Admission Cmt: None   Principal Problem: Ureteral mass [N28.89]                   Active Insurance as of 2/26/2024       Primary Coverage       Payor Plan Insurance Group Employer/Plan Group    [employer]HEM BLUE CROSS ANTHEM BLUE CROSS BLUE SHIELD PPO [id number]       Payor Plan Address Payor Plan Phone Number Payor Plan Fax Number Effective Dates    [address] [phone]  5/1/2019 - None Entered    [place] [address]         Subscriber Name Subscriber Birth Date Member ID       ALEXI MORAES 1960 [id number]                     Emergency Contacts        (Rel.) Home Phone Work Phone Mobile Phone    ROSARIO MORAES (Spouse) [phone]              Discharge Summary    No notes of this type exist for this encounter.     Akira Barnett MD   Physician  Urology     Progress Notes     Signed     Date of Service: 02/28/24 1221  Creation Time: 02/28/24 1221     Signed         AFVSS  POD#2  + BM.  Pain controlled.     A/p - s/p Robotic L NU.  D/c RIA drain.  Regular diet.  Leg bag.  D/c to home.  F/u 5 days swan catheter removal.                    "

## 2024-02-29 NOTE — CASE MANAGEMENT/SOCIAL WORK
Case Management Discharge Note      Final Note: Home with family transport. Orders reviewed no further discharge needs.         Selected Continued Care - Discharged on 2/28/2024 Admission date: 2/26/2024 - Discharge disposition: Home or Self Care      Destination    No services have been selected for the patient.                Durable Medical Equipment    No services have been selected for the patient.                Dialysis/Infusion    No services have been selected for the patient.                Home Medical Care    No services have been selected for the patient.                Therapy    No services have been selected for the patient.                Community Resources    No services have been selected for the patient.                Community & DME    No services have been selected for the patient.                         Final Discharge Disposition Code: 01 - home or self-care

## 2024-03-26 DIAGNOSIS — K21.00 GASTROESOPHAGEAL REFLUX DISEASE WITH ESOPHAGITIS WITHOUT HEMORRHAGE: ICD-10-CM

## 2024-03-26 RX ORDER — VONOPRAZAN FUMARATE 13.36 MG/1
10 TABLET ORAL DAILY
Qty: 90 TABLET | Refills: 0 | Status: SHIPPED | OUTPATIENT
Start: 2024-03-26

## 2024-03-26 RX ORDER — VONOPRAZAN FUMARATE 26.72 MG/1
20 TABLET ORAL DAILY
Qty: 30 TABLET | Refills: 1 | OUTPATIENT
Start: 2024-03-26

## 2024-04-03 ENCOUNTER — OFFICE VISIT (OUTPATIENT)
Dept: GASTROENTEROLOGY | Facility: CLINIC | Age: 64
End: 2024-04-03
Payer: COMMERCIAL

## 2024-04-03 VITALS
DIASTOLIC BLOOD PRESSURE: 82 MMHG | WEIGHT: 255.8 LBS | SYSTOLIC BLOOD PRESSURE: 122 MMHG | HEIGHT: 72 IN | BODY MASS INDEX: 34.65 KG/M2

## 2024-04-03 DIAGNOSIS — K22.70 BARRETT'S ESOPHAGUS WITHOUT DYSPLASIA: ICD-10-CM

## 2024-04-03 DIAGNOSIS — Z87.19 HISTORY OF ESOPHAGEAL STRICTURE: ICD-10-CM

## 2024-04-03 DIAGNOSIS — K21.00 GASTROESOPHAGEAL REFLUX DISEASE WITH ESOPHAGITIS WITHOUT HEMORRHAGE: Primary | ICD-10-CM

## 2024-04-03 RX ORDER — DULOXETIN HYDROCHLORIDE 30 MG/1
30 CAPSULE, DELAYED RELEASE ORAL DAILY
COMMUNITY
Start: 2024-03-26 | End: 2025-03-26

## 2024-04-03 RX ORDER — VONOPRAZAN FUMARATE 13.36 MG/1
10 TABLET ORAL DAILY
Qty: 90 TABLET | Refills: 3 | Status: SHIPPED | OUTPATIENT
Start: 2024-04-03

## 2024-04-03 NOTE — PROGRESS NOTES
PATIENT INFORMATION  Sawyer Moraes       - 1960    CHIEF COMPLAINT  Chief Complaint   Patient presents with    Heartburn    History Esophageal Stricture        HISTORY OF PRESENT ILLNESS    Here today for GERD follow-up     GERD: Quinten MASSEY was doing well on daily PPI.  Reflux is doing well when avoiding acidic drinks, such a coffee. Water calms reflux down. Had chicken stick within the last week, was eating too fast and finally passed. General week experiencing reflux 3 days a week. Has had no other difficulty swallowing. We started voquezna. TODAY: Doing well with Voquezna, nearly no reflux, swallowing is doing ok. Last week ate toast too fast with coke was slow to pass, very rare, no other issues.    Bowels are doing ok, no issues or concerns.     2023 EGD for HB and dysphagia, positive for stricture, mild chronic gastritis, erosive reflux esophagitis with barretts, no dysplasia, celiac, HP.      10/25/2021 last colon normal with hemorrhoids. Personal hx polyps.    Heartburn  He reports no abdominal pain or no nausea.       REVIEWED PERTINENT RESULTS/ LABS  Lab Results   Component Value Date    CASEREPORT  2024     Surgical Pathology Report                         Case: [id number]                                  Authorizing Provider:  Akira Barnett MD Collected:           2024 04:09 PM          Ordering Location:     [facility]  Received:            2024 09:54 PM                                 MAIN OR                                                                      Pathologist:           Florence Williamson MD                                                    Specimens:   1) - Ureter, Left, Left kidney ureter                                                               2) - Ureter, Left, Distal ureter                                                           FINALDX  2024     1.  Ureter, left, nephro ureterectomy:   -Atrophic renal parenchyma  with tubular thyroidization and dystrophic calcification.   -Dilated ureter lumen consistent with history of hydronephrosis.   -Reactive urothelial mucosa without metaplasia or dysplasia.   -Benign margins of resection.    2.  Distal ureter, left, excision:   -Benign urothelial mucosa, negative for metaplasia and dysplasia.    SWM       Lab Results   Component Value Date    HGB 13.6 02/27/2024    MCV 88.8 02/27/2024     02/27/2024    ALT 22 02/19/2024    AST 17 02/19/2024      No results found.    REVIEW OF SYSTEMS  Review of Systems   Constitutional: Negative.    HENT: Negative.     Eyes: Negative.    Respiratory: Negative.     Cardiovascular: Negative.    Gastrointestinal:  Negative for abdominal pain, constipation, diarrhea, nausea and vomiting.        GERD, Hx of esophageal stricture   Endocrine: Negative.    Genitourinary: Negative.    Musculoskeletal:  Positive for neck pain.   Skin: Negative.    Allergic/Immunologic: Negative.    Neurological: Negative.    Hematological: Negative.    Psychiatric/Behavioral: Negative.           ACTIVE PROBLEMS  Patient Active Problem List    Diagnosis     Renal mass [N28.89]     Ureteral mass [N28.89]     Abnormal CT scan, kidney [R93.429]     Gross hematuria [R31.0]     History of esophageal stricture [Z87.19]     Saavedra's esophagus [K22.70]     Gastroesophageal reflux disease with esophagitis without hemorrhage [K21.00]     Esophageal dysphagia [R13.19]          PAST MEDICAL HISTORY  Past Medical History:   Diagnosis Date    Alcoholism     Asthma     about 6 months ago during covid had issues does not use inhaler    Saavedra esophagus     Cancer     skin cancers to back    Cervical disc disorder 2010    Had epidural injections which helped    Cholelithiasis Removed 2007    Colon polyp Benign- removed    Diarrhea     GERD (gastroesophageal reflux disease)     Hypertension     Kidney trouble     only has one kidney    Knee sprain 09/23    Treated at [facility] ER for knee  sprain    Knee swelling 2020    Saw Dr. Rivas Patton for knee problem.    Low back strain 2010    Lumbosacral disc disease 2015 herniated disk    Epdural helped. Take oxycodone for pain    Periarthritis of shoulder 2020    Rotator cuff syndrome 2020    Was seeing Zualy Patton ortho, have been taking Cortisone shots which help. PT. at [facility] for 6 months helped. Pain is returning.    Scoliosis 2010    Sleep apnea     uses CPAP    Tendinitis of knee 2010    Ureteral mass     Urgency of urination     frequency         SURGICAL HISTORY  Past Surgical History:   Procedure Laterality Date    COLONOSCOPY      CYSTOSCOPY RETROGRADE PYELOGRAM Right 09/11/2023    Procedure: CYSTOSCOPY BILATERAL  RETROGRADES WITH PYELOGRAM;  Surgeon: Vince Hughes MD;  Location: [facility] OR;  Service: Urology;  Laterality: Right;    ENDOSCOPY N/A 04/04/2023    Procedure: ESOPHAGOGASTRODUODENOSCOPY with 15-18mm Balloon;  Surgeon: Quincy Tong MD;  Location: [facility] OR;  Service: Gastroenterology;  Laterality: N/A;  Ulcerative Esophagitis, duodenitis, Gastritis, Esophageal Stricture    LAPAROSCOPIC CHOLECYSTECTOMY      NEPHROURETERECTOMY Left 2/26/2024    Procedure: ROBOTIC LEFT NEPHROURETERECTOMY;  Surgeon: Akira Barnett MD;  Location: [facility] OR;  Service: Robotics - DaVinci;  Laterality: Left;    UPPER GASTROINTESTINAL ENDOSCOPY  2008         FAMILY HISTORY  Family History   Problem Relation Age of Onset    Diabetes Father     Heart disease Father     Malig Hyperthermia Neg Hx          SOCIAL HISTORY  Social History     Occupational History    Not on file   Tobacco Use    Smoking status: Never     Passive exposure: Never    Smokeless tobacco: Never   Vaping Use    Vaping status: Never Used   Substance and Sexual Activity    Alcohol use: Not Currently    Drug use: Not Currently     Types: Oxycodone     Comment: Was in treatment for opiod addiction    Sexual activity: Yes     Partners: Female     Birth  "control/protection: Vasectomy         CURRENT MEDICATIONS    Current Outpatient Medications:     cyclobenzaprine (FLEXERIL) 5 MG tablet, Take 1 tablet by mouth 2 (Two) Times a Day As Needed., Disp: , Rfl:     DULoxetine (CYMBALTA) 30 MG capsule, Take 1 capsule by mouth Daily., Disp: , Rfl:     fluticasone (FLONASE) 50 MCG/ACT nasal spray, 1 spray by Each Nare route As Needed., Disp: , Rfl:     folic acid (FOLVITE) 1 MG tablet, Take 1 tablet by mouth Daily. HOLD FOR SURGERY, Disp: , Rfl:     Gabapentin 10 %, Ketamine HCl 4 %, Apply 1-2 g topically to the appropriate area as directed 3 (Three) to 4 (Four) times daily., Disp: 90 g, Rfl: 5    hydrOXYzine (ATARAX) 10 MG tablet, Take 1 tablet by mouth 3 (Three) Times a Day As Needed for Itching., Disp: , Rfl:     metoprolol tartrate (LOPRESSOR) 25 MG tablet, Take 2 tablets by mouth 2 (Two) Times a Day As Needed (BLOOD PRESSURE)., Disp: , Rfl:     Sildenafil Citrate (VIAGRA PO), Take  by mouth As Needed (ED). HOLD 48 HOURS PRIOR TO SURGERY, Disp: , Rfl:     sucralfate (CARAFATE) 1 g tablet, Take 1 tablet by mouth As Needed., Disp: , Rfl:     tamsulosin (FLOMAX) 0.4 MG capsule 24 hr capsule, Take 1 capsule by mouth 2 (Two) Times a Day., Disp: , Rfl:     traZODone (DESYREL) 50 MG tablet, Take 2 tablets by mouth every night at bedtime., Disp: , Rfl:     Vonoprazan Fumarate (Voquezna) 10 MG tablet, Take 1 tablet by mouth Daily., Disp: 90 tablet, Rfl: 3    zolpidem (AMBIEN) 10 MG tablet, 1 tablet At Night As Needed., Disp: , Rfl:     docusate sodium (Colace) 100 MG capsule, Take 1 capsule by mouth 2 (Two) Times a Day. (Patient not taking: Reported on 4/3/2024), Disp: 40 capsule, Rfl: 0    ALLERGIES  Aspirin, Ibuprofen, and Nsaids    VITALS  Vitals:    04/03/24 1022   BP: 122/82   BP Location: Left arm   Patient Position: Sitting   Cuff Size: Large Adult   Weight: 116 kg (255 lb 12.8 oz)   Height: 182.9 cm (72.01\")       PHYSICAL EXAM  Debilities/Disabilities Identified: " "None  Emotional Behavior: Appropriate  Wt Readings from Last 3 Encounters:   04/03/24 116 kg (255 lb 12.8 oz)   02/26/24 113 kg (250 lb)   02/19/24 117 kg (257 lb 4.8 oz)     Ht Readings from Last 1 Encounters:   04/03/24 182.9 cm (72.01\")     Body mass index is 34.69 kg/m².  Physical Exam  Constitutional:       General: He is not in acute distress.     Appearance: Normal appearance. He is not ill-appearing.   HENT:      Head: Normocephalic and atraumatic.      Mouth/Throat:      Mouth: Mucous membranes are moist.      Pharynx: No posterior oropharyngeal erythema.   Eyes:      General: No scleral icterus.  Cardiovascular:      Rate and Rhythm: Normal rate and regular rhythm.      Heart sounds: Normal heart sounds.   Pulmonary:      Effort: Pulmonary effort is normal.      Breath sounds: Normal breath sounds.   Abdominal:      General: Abdomen is flat. Bowel sounds are normal. There is no distension.      Palpations: Abdomen is soft. There is no mass.      Tenderness: There is no abdominal tenderness. There is no guarding or rebound. Negative signs include Fu's sign.      Hernia: No hernia is present.   Musculoskeletal:      Cervical back: Neck supple.   Skin:     General: Skin is warm.      Capillary Refill: Capillary refill takes less than 2 seconds.   Neurological:      General: No focal deficit present.      Mental Status: He is alert and oriented to person, place, and time.   Psychiatric:         Mood and Affect: Mood normal.         Behavior: Behavior normal.         Thought Content: Thought content normal.         Judgment: Judgment normal.         CLINICAL DATA REVIEWED   reviewed previous lab results and integrated with today's visit, reviewed notes from other physicians and/or last GI encounter, reviewed previous endoscopy results and available photos, reviewed surgical pathology results from previous biopsies    ASSESSMENT  Diagnoses and all orders for this visit:    Gastroesophageal reflux disease " with esophagitis without hemorrhage  -     Vonoprazan Fumarate (Voquezna) 10 MG tablet; Take 1 tablet by mouth Daily.    Saavedra's esophagus without dysplasia    History of esophageal stricture    Other orders  -     DULoxetine (CYMBALTA) 30 MG capsule; Take 1 capsule by mouth Daily.          PLAN    Continue Voquezna    Return in about 6 months (around 10/3/2024).    I have discussed the above plan with the patient.  They verbalize understanding and are in agreement with the plan.  They have been advised to contact the office for any questions, concerns, or changes related to their health.

## 2024-05-15 ENCOUNTER — OFFICE VISIT (OUTPATIENT)
Dept: ORTHOPEDIC SURGERY | Facility: CLINIC | Age: 64
End: 2024-05-15
Payer: COMMERCIAL

## 2024-05-15 VITALS — BODY MASS INDEX: 34.69 KG/M2 | HEIGHT: 72 IN

## 2024-05-15 DIAGNOSIS — M25.561 MECHANICAL KNEE PAIN, RIGHT: ICD-10-CM

## 2024-05-15 DIAGNOSIS — M25.461 EFFUSION OF RIGHT KNEE: ICD-10-CM

## 2024-05-15 DIAGNOSIS — M94.261 CHONDROMALACIA OF KNEE, RIGHT: Primary | ICD-10-CM

## 2024-05-15 PROCEDURE — 99213 OFFICE O/P EST LOW 20 MIN: CPT | Performed by: ORTHOPAEDIC SURGERY

## 2024-05-15 RX ORDER — GABAPENTIN 300 MG/1
300 CAPSULE ORAL 3 TIMES DAILY
COMMUNITY
Start: 2024-04-12

## 2024-05-15 NOTE — PROGRESS NOTES
Subjective:     Patient ID: Sawyer Moraes is a 64 y.o. male.    Chief Complaint:  Follow-up right knee pain, DJD    History of Present Illness  Sawyer Moraes presents to clinic today for evaluation of right knee with a new exacerbation.    He states that over the last 3 to 4 weeks, he has had increasing levels of pain, but he is having more mechanical type pain, particularly localized along the anterior medial and medial aspect of his right knee, worse with getting up from a seated position, but especially with stair climbing. He does note a catching sensation, occasional sharp pain, does note moderate baseline aching type pain as well. Mild swelling, it does wax and wane. He denies any fevers, chills, or sweats. He denies any nikole buckling or locking of his knee. He denies any associated hip or groin pain.     Social History     Occupational History    Not on file   Tobacco Use    Smoking status: Never     Passive exposure: Never    Smokeless tobacco: Never   Vaping Use    Vaping status: Never Used   Substance and Sexual Activity    Alcohol use: Not Currently    Drug use: Not Currently     Types: Oxycodone     Comment: Was in treatment for opiod addiction    Sexual activity: Yes     Partners: Female     Birth control/protection: Vasectomy      Past Medical History:   Diagnosis Date    Alcoholism     Asthma     about 6 months ago during covid had issues does not use inhaler    Saavedra esophagus     Cancer     skin cancers to back    Cervical disc disorder 2010    Had epidural injections which helped    Cholelithiasis Removed 2007    Colon polyp Benign- removed    Diarrhea     GERD (gastroesophageal reflux disease)     Hypertension     Kidney trouble     only has one kidney    Knee sprain 09/23    Treated at [facility] ER for knee sprain    Knee swelling 2020    Saw Dr. Rivas Patton for knee problem.    Low back strain 2010    Lumbosacral disc disease 2015 herniated disk    Epdural helped. Take oxycodone for pain     "Periarthritis of shoulder 2020    Rotator cuff syndrome 2020    Was seeing Zulay Patton ortho, have been taking Cortisone shots which help. PT. at [facility] for 6 months helped. Pain is returning.    Scoliosis 2010    Sleep apnea     uses CPAP    Tendinitis of knee 2010    Ureteral mass     Urgency of urination     frequency     Past Surgical History:   Procedure Laterality Date    COLONOSCOPY      CYSTOSCOPY RETROGRADE PYELOGRAM Right 09/11/2023    Procedure: CYSTOSCOPY BILATERAL  RETROGRADES WITH PYELOGRAM;  Surgeon: Vince Hughes MD;  Location: [facility] OR;  Service: Urology;  Laterality: Right;    ENDOSCOPY N/A 04/04/2023    Procedure: ESOPHAGOGASTRODUODENOSCOPY with 15-18mm Balloon;  Surgeon: Quincy Tong MD;  Location: [facility] OR;  Service: Gastroenterology;  Laterality: N/A;  Ulcerative Esophagitis, duodenitis, Gastritis, Esophageal Stricture    LAPAROSCOPIC CHOLECYSTECTOMY      NEPHROURETERECTOMY Left 2/26/2024    Procedure: ROBOTIC LEFT NEPHROURETERECTOMY;  Surgeon: Akira Barnett MD;  Location: [facility] OR;  Service: Robotics - DaVinci;  Laterality: Left;    UPPER GASTROINTESTINAL ENDOSCOPY  2008       Family History   Problem Relation Age of Onset    Diabetes Father     Heart disease Father     Malig Hyperthermia Neg Hx          Review of Systems        Objective:  Vitals:    05/15/24 1413   Height: 182.9 cm (72.01\")     There were no vitals filed for this visit.  Body mass index is 34.69 kg/m².  Physical Exam    Vital signs reviewed.   General: No acute distress, alert and oriented  Eyes: conjunctiva clear; pupils equally round and reactive  ENT: external ears and nose atraumatic; oropharynx clear  CV: no peripheral edema  Resp: normal respiratory effort  Skin: no rashes or wounds; normal turgor  Psych: mood and affect appropriate; recent and remote memory intact        Ortho Exam     Right knee exam  The right knee has an range of motion from 0 to 130 degrees.   There is " 4 plus out of 5 strength on flexion and extension.   There is maximal tenderness in the medial joint line as well as the medial and lateral patellar facet.   The active patellar compression test is moderately positive.   Griselda's test is positive with pain along the medial joint line, a mild click.   The knee is stable to varus and valgus at 0 and 30 degrees.   There is a grade 1A Lachman.   The anterior and posterior drawer tests are negative.    Imaging:  None today  Assessment:        1. Chondromalacia of knee, right    2. Mechanical knee pain, right    3. Effusion of right knee           Plan:          Discussed treatment options at length with patient at today's visit, reviewed again with him findings from his prior x-rays indicating mild chondromalacia. He is having more mechanical symptoms at this point in time concerning possible meniscal versus focal chondral pathology. Given the failure to improve with home exercise, activity modification, anti-inflammatory medications, and his associated mechanical symptoms, we discussed options, he would like to proceed with MRI today MRI at this time.  Order entered for MRI to evaluate for meniscal and chondral pathology.   Follow up: Follow up to review results and discuss further options.      Sawyer Moraes was in agreement with plan and had all questions answered.     Orders:  Orders Placed This Encounter   Procedures    MRI Knee Right Without Contrast       Medications:  No orders of the defined types were placed in this encounter.      Followup:  Return for review of MRI results.    Diagnoses and all orders for this visit:    1. Chondromalacia of knee, right (Primary)    2. Mechanical knee pain, right  -     MRI Knee Right Without Contrast; Future    3. Effusion of right knee  -     MRI Knee Right Without Contrast; Future          Dictated utilizing Dragon dictation     Transcribed from ambient dictation for Steve Altman MD by Camelia Oshea.  05/15/24    16:35 EDT    Patient or patient representative verbalized consent to the visit recording.  I have personally performed the services described in this document as transcribed by the above individual, and it is both accurate and complete.

## 2024-05-31 ENCOUNTER — HOSPITAL ENCOUNTER (OUTPATIENT)
Dept: MRI IMAGING | Facility: HOSPITAL | Age: 64
Discharge: HOME | End: 2024-05-31
Admitting: ORTHOPAEDIC SURGERY
Payer: COMMERCIAL

## 2024-05-31 DIAGNOSIS — M25.461 EFFUSION OF RIGHT KNEE: ICD-10-CM

## 2024-05-31 DIAGNOSIS — M25.561 MECHANICAL KNEE PAIN, RIGHT: ICD-10-CM

## 2024-05-31 PROCEDURE — 73721 MRI JNT OF LWR EXTRE W/O DYE: CPT

## 2024-06-05 ENCOUNTER — OFFICE VISIT (OUTPATIENT)
Dept: ORTHOPEDIC SURGERY | Facility: CLINIC | Age: 64
End: 2024-06-05
Payer: COMMERCIAL

## 2024-06-05 VITALS — BODY MASS INDEX: 34.69 KG/M2 | HEIGHT: 72 IN

## 2024-06-05 DIAGNOSIS — M25.461 EFFUSION OF RIGHT KNEE: ICD-10-CM

## 2024-06-05 DIAGNOSIS — M23.51 OLD COMPLETE TEAR OF ANTERIOR CRUCIATE LIGAMENT OF RIGHT KNEE: ICD-10-CM

## 2024-06-05 DIAGNOSIS — M94.261 CHONDROMALACIA OF KNEE, RIGHT: Primary | ICD-10-CM

## 2024-06-05 DIAGNOSIS — M17.11 PATELLOFEMORAL ARTHRITIS OF RIGHT KNEE: ICD-10-CM

## 2024-06-05 PROCEDURE — 99213 OFFICE O/P EST LOW 20 MIN: CPT | Performed by: ORTHOPAEDIC SURGERY

## 2024-06-05 NOTE — PROGRESS NOTES
Subjective:     Patient ID: Sawyer Moraes is a 64 y.o. male.    Chief Complaint:  Follow-up right knee pain, follow-up MRI results  History of Present Illness  Sawyer Moraes returns to clinic today for evaluation of right knee, states overall he is actually doing a little bit better at this point in time regards to his pain, still notes some pain with deep flexion activities particular the anterior aspect of his knee.  He denies any numbness or tingling, denies any nikole locking at this point in time.  He has had moderate improvement with his previous swelling.  Occasionally taking anti-inflammatory medications over-the-counter with moderate improvement.  Denies any hip or groin pain at this time.     Social History     Occupational History    Not on file   Tobacco Use    Smoking status: Never     Passive exposure: Never    Smokeless tobacco: Never   Vaping Use    Vaping status: Never Used   Substance and Sexual Activity    Alcohol use: Not Currently    Drug use: Not Currently     Types: Oxycodone     Comment: Was in treatment for opiod addiction    Sexual activity: Yes     Partners: Female     Birth control/protection: Vasectomy      Past Medical History:   Diagnosis Date    Alcoholism     Asthma     about 6 months ago during covid had issues does not use inhaler    Saavedra esophagus     Cancer     skin cancers to back    Cervical disc disorder 2010    Had epidural injections which helped    Cholelithiasis Removed 2007    Colon polyp Benign- removed    Diarrhea     GERD (gastroesophageal reflux disease)     Hypertension     Kidney trouble     only has one kidney    Knee sprain 09/23    Treated at [facility] ER for knee sprain    Knee swelling 2020    Saw Dr. Rivas Patton for knee problem.    Low back strain 2010    Lumbosacral disc disease 2015 herniated disk    Epdural helped. Take oxycodone for pain    Periarthritis of shoulder 2020    Rotator cuff syndrome 2020    Was seeing Zulay prakash, have been taking  "Cortisone shots which help. PT. at [facility] for 6 months helped. Pain is returning.    Scoliosis 2010    Sleep apnea     uses CPAP    Tendinitis of knee 2010    Ureteral mass     Urgency of urination     frequency     Past Surgical History:   Procedure Laterality Date    COLONOSCOPY      CYSTOSCOPY RETROGRADE PYELOGRAM Right 09/11/2023    Procedure: CYSTOSCOPY BILATERAL  RETROGRADES WITH PYELOGRAM;  Surgeon: Vince Hughes MD;  Location: [facility] OR;  Service: Urology;  Laterality: Right;    ENDOSCOPY N/A 04/04/2023    Procedure: ESOPHAGOGASTRODUODENOSCOPY with 15-18mm Balloon;  Surgeon: Quincy Tong MD;  Location: [facility] OR;  Service: Gastroenterology;  Laterality: N/A;  Ulcerative Esophagitis, duodenitis, Gastritis, Esophageal Stricture    LAPAROSCOPIC CHOLECYSTECTOMY      NEPHROURETERECTOMY Left 2/26/2024    Procedure: ROBOTIC LEFT NEPHROURETERECTOMY;  Surgeon: Akira Barnett MD;  Location: [facility] OR;  Service: Robotics - DaVinci;  Laterality: Left;    UPPER GASTROINTESTINAL ENDOSCOPY  2008       Family History   Problem Relation Age of Onset    Diabetes Father     Heart disease Father     Malig Hyperthermia Neg Hx          Review of Systems        Objective:  Vitals:    06/05/24 1406   Height: 182.9 cm (72\")     There were no vitals filed for this visit.  Body mass index is 34.69 kg/m².  Physical Exam    Vital signs reviewed.   General: No acute distress, alert and oriented  Eyes: conjunctiva clear; pupils equally round and reactive  ENT: external ears and nose atraumatic; oropharynx clear  CV: no peripheral edema  Resp: normal respiratory effort  Skin: no rashes or wounds; normal turgor  Psych: mood and affect appropriate; recent and remote memory intact        Ortho Exam     Right knee-active range of motion 0 to 135 degrees, 4+ out of 5 strength on flexion and extension.  Maximal tenderness to palpation today over medial and lateral patellar facet, minimal tenderness over medial " joint line.  Mildly positive active patellar compression test.  Negative Griselda exam on today's exam.  Stable to varus and valgus stress at 0 and 30 degrees.    Imaging:    MRI Knee Right Without Contrast    Result Date: 5/31/2024  Impression: Focal irregular signal along the free edge of the body of both the lateral and medial menisci as above which could represent focal nondisplaced meniscal tears. No evidence of displaced meniscal tear, parameniscal cyst formation, or root involvement. Moderate to severe patellofemoral chondrosis as above. Associated patchy subchondral edema within the patella. Trace joint effusion. Electronically Signed: Naveed Choudhury MD  5/31/2024 12:05 PM EDT  Workstation ID: [id number]     Review of outside MRI images including review of imaging as well as radiology report indicates advanced full-thickness chondral loss as well as subchondral edema of the patellofemoral joint, medial and lateral compartments appear to be grossly intact with minimal evidence of meniscal or chondral pathology.  ACL does appear to be deficient despite radiology read of ACL being intact-lack of distinct and full attachment of the proximal ACL to the lateral wall of the notch most noted on coronal view    Assessment:        1. Chondromalacia of knee, right    2. Effusion of right knee    3. Old complete tear of anterior cruciate ligament of right knee    4. Patellofemoral arthritis of right knee           Plan:          Discussed treatment options at length with patient at today's visit.  Reviewed with patient findings from MRI as well as discussed with him the fact that his knee does appear to clinically be getting better at this time.  I did discuss with him that he has fairly significant arthritis in the patellofemoral joint and I think this is probably contributing to his recent exacerbation given the subchondral edema particularly noted on the patella.  It does appear like he is improving clinically with  conservative treatment and he is happy with his progress at this point.  Did discuss option for injection-he has declined at this point but if his pain does get worse we may consider at that time.  Continue with hip core and quad strengthening exercises  Follow up: As needed      Sawyer Moraes was in agreement with plan and had all questions answered.     Orders:  No orders of the defined types were placed in this encounter.      Medications:  No orders of the defined types were placed in this encounter.      Followup:  Return if symptoms worsen or fail to improve.    Diagnoses and all orders for this visit:    1. Chondromalacia of knee, right (Primary)    2. Effusion of right knee    3. Old complete tear of anterior cruciate ligament of right knee    4. Patellofemoral arthritis of right knee          Dictated utilizing Dragon dictation

## 2024-08-05 ENCOUNTER — TELEPHONE (OUTPATIENT)
Dept: GASTROENTEROLOGY | Facility: CLINIC | Age: 64
End: 2024-08-05
Payer: COMMERCIAL

## 2024-08-05 DIAGNOSIS — K21.00 GASTROESOPHAGEAL REFLUX DISEASE WITH ESOPHAGITIS WITHOUT HEMORRHAGE: ICD-10-CM

## 2024-08-05 RX ORDER — VONOPRAZAN FUMARATE 13.36 MG/1
10 TABLET ORAL DAILY
Qty: 90 TABLET | Refills: 3 | Status: SHIPPED | OUTPATIENT
Start: 2024-08-05

## 2024-08-05 NOTE — TELEPHONE ENCOUNTER
PT called stated needs RX for voquezna sent to [facility]  Currently on 10mg dosing started on 04/03/2024    Send in refill-

## 2024-09-04 ENCOUNTER — OFFICE VISIT (OUTPATIENT)
Dept: ORTHOPEDIC SURGERY | Facility: CLINIC | Age: 64
End: 2024-09-04
Payer: COMMERCIAL

## 2024-09-04 VITALS — WEIGHT: 255 LBS | BODY MASS INDEX: 34.54 KG/M2 | HEIGHT: 72 IN

## 2024-09-04 DIAGNOSIS — M94.261 CHONDROMALACIA OF KNEE, RIGHT: Primary | ICD-10-CM

## 2024-09-04 DIAGNOSIS — M23.51 OLD COMPLETE TEAR OF ANTERIOR CRUCIATE LIGAMENT OF RIGHT KNEE: ICD-10-CM

## 2024-09-04 DIAGNOSIS — M17.11 PATELLOFEMORAL ARTHRITIS OF RIGHT KNEE: ICD-10-CM

## 2024-09-04 PROCEDURE — 99213 OFFICE O/P EST LOW 20 MIN: CPT | Performed by: ORTHOPAEDIC SURGERY

## 2024-09-04 PROCEDURE — 20610 DRAIN/INJ JOINT/BURSA W/O US: CPT | Performed by: ORTHOPAEDIC SURGERY

## 2024-09-04 RX ORDER — LIDOCAINE HYDROCHLORIDE 10 MG/ML
8 INJECTION, SOLUTION EPIDURAL; INFILTRATION; INTRACAUDAL; PERINEURAL
Status: COMPLETED | OUTPATIENT
Start: 2024-09-04 | End: 2024-09-04

## 2024-09-04 RX ORDER — TRIAMCINOLONE ACETONIDE 40 MG/ML
80 INJECTION, SUSPENSION INTRA-ARTICULAR; INTRAMUSCULAR
Status: COMPLETED | OUTPATIENT
Start: 2024-09-04 | End: 2024-09-04

## 2024-09-04 RX ORDER — SILDENAFIL CITRATE 20 MG/1
TABLET ORAL
COMMUNITY
Start: 2024-06-10

## 2024-09-04 RX ORDER — CYCLOBENZAPRINE HCL 10 MG
10 TABLET ORAL
COMMUNITY
Start: 2024-06-24

## 2024-09-04 RX ORDER — MODAFINIL 200 MG/1
200 TABLET ORAL DAILY
COMMUNITY
Start: 2024-08-02

## 2024-09-04 RX ADMIN — TRIAMCINOLONE ACETONIDE 80 MG: 40 INJECTION, SUSPENSION INTRA-ARTICULAR; INTRAMUSCULAR at 15:08

## 2024-09-04 RX ADMIN — LIDOCAINE HYDROCHLORIDE 8 ML: 10 INJECTION, SOLUTION EPIDURAL; INFILTRATION; INTRACAUDAL; PERINEURAL at 15:08

## 2024-09-04 NOTE — PROGRESS NOTES
Subjective:     Patient ID: Sawyer Moraes is a 64 y.o. male.    Chief Complaint:  Follow up right knee pain, patellofemoral DJD, chondromalacia, ACL graft deficiency       History of Present Illness  History of Present Illness  The patient returns to the clinic today for a follow-up evaluation regarding his right knee.    His last injection was administered over 8 months ago. He reports that the pain, particularly on the front and side of his right knee, has been escalating over the past 4 to 6 weeks. The pain is described as moderate in intensity, occasionally reaching severe levels, and is particularly aching in nature, with sporadic sharp pain. The discomfort intensifies when he rises from a seated position or engages in deep flexion activities such as kneeling and stair climbing.    He does not experience any distinct locking sensation but does mention occasional catching. He also reports no numbness, tingling, fevers, chills, or sweats. Rest, soft tissue massage, activity modification, and over-the-counter anti-inflammatory medications have provided minimal relief.     Social History     Occupational History    Not on file   Tobacco Use    Smoking status: Never     Passive exposure: Never    Smokeless tobacco: Never   Vaping Use    Vaping status: Never Used   Substance and Sexual Activity    Alcohol use: Not Currently    Drug use: Not Currently     Types: Oxycodone     Comment: Was in treatment for opiod addiction    Sexual activity: Yes     Partners: Female     Birth control/protection: Vasectomy      Past Medical History:   Diagnosis Date    Alcoholism     Asthma     about 6 months ago during covid had issues does not use inhaler    Saavedra esophagus     Cancer     skin cancers to back    Cervical disc disorder 2010    Had epidural injections which helped    Cholelithiasis Removed 2007    Colon polyp Benign- removed    Diarrhea     GERD (gastroesophageal reflux disease)     Hypertension     Kidney trouble      "only has one kidney    Knee sprain 09/23    Treated at [facility] ER for knee sprain    Knee swelling 2020    Saw Dr. Rivas Patton for knee problem.    Low back strain 2010    Lumbosacral disc disease 2015 herniated disk    Epdural helped. Take oxycodone for pain    Periarthritis of shoulder 2020    Rotator cuff syndrome 2020    Was seeing Zulay Patton ortho, have been taking Cortisone shots which help. PT. at [facility] [place] for 6 months helped. Pain is returning.    Scoliosis 2010    Sleep apnea     uses CPAP    Tendinitis of knee 2010    Ureteral mass     Urgency of urination     frequency     Past Surgical History:   Procedure Laterality Date    COLONOSCOPY      CYSTOSCOPY RETROGRADE PYELOGRAM Right 09/11/2023    Procedure: CYSTOSCOPY BILATERAL  RETROGRADES WITH PYELOGRAM;  Surgeon: Vince Hughes MD;  Location: [facility];  Service: Urology;  Laterality: Right;    ENDOSCOPY N/A 04/04/2023    Procedure: ESOPHAGOGASTRODUODENOSCOPY with 15-18mm Balloon;  Surgeon: Quincy Tong MD;  Location: [facility];  Service: Gastroenterology;  Laterality: N/A;  Ulcerative Esophagitis, duodenitis, Gastritis, Esophageal Stricture    LAPAROSCOPIC CHOLECYSTECTOMY      NEPHROURETERECTOMY Left 2/26/2024    Procedure: ROBOTIC LEFT NEPHROURETERECTOMY;  Surgeon: Akira Barnett MD;  Location: [facility];  Service: Robotics - [facility];  Laterality: Left;    UPPER GASTROINTESTINAL ENDOSCOPY  2008       Family History   Problem Relation Age of Onset    Diabetes Father     Heart disease Father     Malig Hyperthermia Neg Hx          Review of Systems        Objective:  Vitals:    09/04/24 1419   Weight: 116 kg (255 lb)   Height: 182.9 cm (72\")         09/04/24  1419   Weight: 116 kg (255 lb)     Body mass index is 34.58 kg/m².  General: No acute distress.  Resp: normal respiratory effort  Skin: no rashes or wounds; normal turgor  Psych: mood and affect appropriate; recent and remote memory intact          Physical " Exam  Right knee has an active range of motion from 0 to 135 degrees, with 4+ out of 5 strength on flexion and extension. There is moderate effusion, maximal tenderness to palpation at the medial and lateral patellar facet as well as the medial joint line with positive Griselda's with pain, no click. Positive active patellar compression test, stable to varus and valgus stress, zero and 30 degrees, negative patellar apprehension test, grade 2B Lachman, 2+ anterior drawer, soft endpoint, negative posterior drawer, negative posterior lateral drawer, negative Dial test.         Imaging:  None today  Assessment:        1. Chronic pain of both shoulders    2. Incomplete tear of right rotator cuff, unspecified whether traumatic- SUBSCAP upper border    3. Biceps tendinitis of right upper extremity    4. Nontraumatic incomplete tear of left rotator cuff    5. Biceps tendinitis of left upper extremity           Plan:    - Large Joint Arthrocentesis: R knee on 9/4/2024 3:08 PM  Indications: pain  Details: 22 G needle, anterolateral approach  Medications: 80 mg triamcinolone acetonide 40 MG/ML; 8 mL lidocaine PF 1% 1 %  Outcome: tolerated well, no immediate complications  Procedure, treatment alternatives, risks and benefits explained, specific risks discussed. Consent was given by the patient. Immediately prior to procedure a time out was called to verify the correct patient, procedure, equipment, support staff and site/side marked as required. Patient was prepped and draped in the usual sterile fashion.               Assessment & Plan  1. Right knee pain.  He reports increasing pain over the anterior and lateral aspect of his right knee over the last 4 to 6 weeks. The pain is moderate in intensity, occasionally severe, and is particularly worse when getting up from a seated position, deep flexion activities, kneeling, and stair climbing. There is minimal improvement with rest, soft tissue massage, activity modification, and  over-the-counter anti-inflammatory medications. On examination, there is moderate effusion, maximal tenderness to palpation at the medial and lateral patellar facet, and medial joint line with a positive Griselda's test with pain but no click. The active patellar compression test is positive, and there is a grade 2B Lachman test with a 2+ anterior drawer and a soft endpoint.   Despite ACL insufficiency, he is functionally doing well from a stability standpoint. The primary issue is related to underlying chondral loss, particularly in the patellofemoral joint.  Total knee arthroplasty was discussed but he prefers to hold off on that at this time. He will proceed with repeat injections today and continue home exercises for range of motion and strength as tolerated.    Patient would like to proceed with cortisone injection today to the right knee. Recommended limited use of affected extremity for the next 24 hours to only essential activites other than work on general active and passive motion. Recommended supplementing with ice and soft tissue massage. Discussed with patient that they should see results in 5-7 days, if no improvement in 5-6 weeks I have asked them to call the office to review other options. Patient should call office immediately if they notice redness, warmth, fevers, chills, or residual numbness or tingling for greater than 6 hours after injection.       Follow-up  He will follow up in 3 months or sooner if needed.    Sawyer Moraes was in agreement with plan and had all questions answered.     Orders:  No orders of the defined types were placed in this encounter.      Medications:  No orders of the defined types were placed in this encounter.      Followup:  No follow-ups on file.    Diagnoses and all orders for this visit:    1. Chronic pain of both shoulders (Primary)  -     Cancel: XR shoulder 2+ vw bilateral    2. Incomplete tear of right rotator cuff, unspecified whether traumatic- SUBSCAP upper  border    3. Biceps tendinitis of right upper extremity    4. Nontraumatic incomplete tear of left rotator cuff    5. Biceps tendinitis of left upper extremity          BMI is >= 30 and <35. (Class 1 Obesity). The following options were offered after discussion;: exercise counseling/recommendations       Dictated utilizing Dragon dictation     Patient or patient representative verbalized consent for the use of Ambient Listening during the visit with  Steve Altman MD for chart documentation. 9/4/2024  14:31 EDT

## 2024-09-30 ENCOUNTER — HOSPITAL ENCOUNTER (OUTPATIENT)
Dept: PHYSICAL THERAPY | Facility: HOSPITAL | Age: 64
Setting detail: THERAPIES SERIES
Discharge: HOME | End: 2024-09-30
Payer: COMMERCIAL

## 2024-09-30 DIAGNOSIS — M25.561 CHRONIC PAIN OF RIGHT KNEE: Primary | ICD-10-CM

## 2024-09-30 DIAGNOSIS — G89.29 CHRONIC PAIN OF RIGHT KNEE: Primary | ICD-10-CM

## 2024-09-30 PROCEDURE — 97161 PT EVAL LOW COMPLEX 20 MIN: CPT

## 2024-09-30 NOTE — THERAPY EVALUATION
Outpatient Physical Therapy Ortho Initial Evaluation   Jewels Mehta     Patient Name: Sawyer Moraes  : 1960  MRN: [id number]  Today's Date: 2024      Visit Date: 2024    Patient Active Problem List   Diagnosis    Gastroesophageal reflux disease with esophagitis without hemorrhage    Esophageal dysphagia    History of esophageal stricture    Saavedra's esophagus    Abnormal CT scan, kidney    Gross hematuria    Ureteral mass    Renal mass        Past Medical History:   Diagnosis Date    Alcoholism     Asthma     about 6 months ago during covid had issues does not use inhaler    Saavedra esophagus     Cancer     skin cancers to back    Cervical disc disorder     Had epidural injections which helped    Cholelithiasis Removed     Colon polyp Benign- removed    Diarrhea     GERD (gastroesophageal reflux disease)     Hypertension     Kidney trouble     only has one kidney    Knee sprain     Treated at [facility] for knee sprain    Knee swelling     Saw Dr. Rivas Patton for knee problem.    Low back strain     Lumbosacral disc disease  herniated disk    Epdural helped. Take oxycodone for pain    Periarthritis of shoulder     Rotator cuff syndrome     Was seeing Zulay Patton ortho, have been taking Cortisone shots which help. PT. at [facility] for 6 months helped. Pain is returning.    Scoliosis 2010    Sleep apnea     uses CPAP    Tendinitis of knee 2010    Ureteral mass     Urgency of urination     frequency        Past Surgical History:   Procedure Laterality Date    COLONOSCOPY      CYSTOSCOPY RETROGRADE PYELOGRAM Right 2023    Procedure: CYSTOSCOPY BILATERAL  RETROGRADES WITH PYELOGRAM;  Surgeon: Vince Hughes MD;  Location: [facility] OR;  Service: Urology;  Laterality: Right;    ENDOSCOPY N/A 2023    Procedure: ESOPHAGOGASTRODUODENOSCOPY with 15-18mm Balloon;  Surgeon: Quincy Tong MD;  Location: [facility] OR;  Service: Gastroenterology;   Laterality: N/A;  Ulcerative Esophagitis, duodenitis, Gastritis, Esophageal Stricture    LAPAROSCOPIC CHOLECYSTECTOMY      NEPHROURETERECTOMY Left 2/26/2024    Procedure: ROBOTIC LEFT NEPHROURETERECTOMY;  Surgeon: Akira Barnett MD;  Location: [facility];  Service: Robotics - [facility];  Laterality: Left;    UPPER GASTROINTESTINAL ENDOSCOPY  2008       Visit Dx:     ICD-10-CM ICD-9-CM   1. Chronic pain of right knee  M25.561 719.46    G89.29 338.29          Patient History       Row Name 09/30/24 0900             History    Chief Complaint Difficulty Walking;Difficulty with daily activities;Muscle tenderness;Muscle weakness;Pain;Joint swelling  -AS      Type of Pain Knee pain  -AS      Brief Description of Current Complaint Sawyer Moraes presents to outpatient PT with reports of chronic right knee pain. He reports increasing pain over the anterior and lateral aspect of his right knee over the last 4 to 6 weeks. The pain is moderate in intensity, occasionally severe, and is particularly worse when getting up from a seated position, deep flexion activities, kneeling, and stair climbing. There is minimal improvement with rest, soft tissue massage, activity modification, and over-the-counter anti-inflammatory medications. He did receive an injection which helped some and his Ortho discussed a TKA. However, patient is holding off on any surgery at this time.  -AS      Previous treatment for THIS PROBLEM Medication;Injections  -AS      Patient/Caregiver Goals Relieve pain;Improve strength;Improve mobility  -AS      Patient's Rating of General Health Good  -AS      Hand Dominance right-handed  -AS      Patient seeing anyone else for problem(s)? Dr. Altman  -AS      How has patient tried to help current problem? rest, medication  -AS      What clinical tests have you had for this problem? X-ray  -AS      Results of Clinical Tests OA  -AS         Pain     Pain Location Knee  -AS      Pain at Present 2  -AS      Pain  at Best 0  -AS      Pain at Worst 6  -AS      Pain Frequency Intermittent  -AS      Pain Description Aching  -AS      What Performance Factors Make the Current Problem(s) WORSE? Activity  -AS      What Performance Factors Make the Current Problem(s) BETTER? Rest  -AS         Daily Activities    Primary Language English  -AS      Are you able to read Yes  -AS      Are you able to write Yes  -AS      How does patient learn best? Listening;Reading;Demonstration  -AS      Teaching needs identified Home Exercise Program;Management of Condition  -AS      Patient is concerned about/has problems with Climbing Stairs;Flexibility;Performing home management (household chores, shopping, care of dependents);Performing job responsibilities/community activities (work, school,;Performing sports, recreation, and play activities;Walking  -AS      Does patient have problems with the following? None  -AS      Barriers to learning None  -AS      Pt Participated in POC and Goals Yes  -AS         Safety    Are you being hurt, hit, or frightened by anyone at home or in your life? No  -AS      Are you being neglected by a caregiver No  -AS                User Key  (r) = Recorded By, (t) = Taken By, (c) = Cosigned By      Initials Name Provider Type    AS Ney Eugene, PT Physical Therapist                     PT Ortho       Row Name 09/30/24 0900       Precautions and Contraindications    Precautions/Limitations no known precautions/limitations  -AS       Subjective Pain    Pre-Treatment Pain Level 2  -AS    Post-Treatment Pain Level 2  -AS       Posture/Observations    Posture- WNL Posture is WNL  -AS    Observations Edema  -AS       Patellar Accessory Motions    Superior glide Right:;WNL  -AS    Inferior glide Right:;WNL  -AS    Medial glide Right:;WNL  -AS    Lateral glide Right:;WNL  -AS       Right Lower Ext    Rt Knee Extension/Flexion AROM 0-135 degrees  -AS       MMT Right Lower Ext    Rt Hip Flexion MMT, Gross Movement  (4-/5) good minus  -AS    Rt Hip Extension MMT, Gross Movement (4-/5) good minus  -AS    Rt Hip ABduction MMT, Gross Movement (4-/5) good minus  -AS    Rt Knee Extension MMT, Gross Movement (4-/5) good minus  -AS    Rt Knee Flexion MMT, Gross Movement (4-/5) good minus  -AS       Sensation    Sensation WNL? WNL  -AS    Light Touch No apparent deficits  -AS       Lower Extremity Flexibility    Hamstrings Right:;Mildly limited  -AS              User Key  (r) = Recorded By, (t) = Taken By, (c) = Cosigned By      Initials Name Provider Type    AS Ney Eugene, PT Physical Therapist                                Therapy Education  Given: HEP  Program: New  How Provided: Verbal, Demonstration, Written  Provided to: Patient  Level of Understanding: Teach back education performed, Verbalized, Demonstrated      PT OP Goals       Row Name 09/30/24 0900          PT Short Term Goals    STG Date to Achieve 10/14/24  -AS     STG 1 Patient to demonstrate compliance with initial HEP for flexibility, ROM and strengthening.  -AS     STG 2 Patient to report right knee pain on VAS of 4-5/10 at worst with activity.  -AS     STG 3 Patient to demonstrate improved right knee and RLE strength to 4/5 in all planes.  -AS        Long Term Goals    LTG Date to Achieve 10/28/24  -AS     LTG 1 Patient to demonstrate compliance with advanced HEP for flexibility, ROM and strengthening.  -AS     LTG 2 Patient to report right knee pain on VAS of 2-3/10 at worst with activity.  -AS     LTG 3 Patient to demonstrate improved right knee and RLE strength to 4+/5 in all planes.  -AS     LTG 4 Patient to report overall improved function on LEFS to 55/80.  -AS        Time Calculation    PT Goal Re-Cert Due Date 10/28/24  -AS               User Key  (r) = Recorded By, (t) = Taken By, (c) = Cosigned By      Initials Name Provider Type    AS Ney Eugene, PT Physical Therapist                     PT Assessment/Plan       Row Name 09/30/24  0900          PT Assessment    Functional Limitations Limitation in home management;Limitations in community activities;Performance in leisure activities;Performance in sport activities;Performance in work activities  -AS     Impairments Edema;Gait;Impaired flexibility;Muscle strength;Pain;Range of motion  -AS     Assessment Comments Sawyer Moraes presents to outpatient PT with reports of chronic right knee pain. He reports increasing pain over the anterior and lateral aspect of his right knee over the last 4 to 6 weeks. The pain is moderate in intensity, occasionally severe, and is particularly worse when getting up from a seated position, deep flexion activities, kneeling, and stair climbing. There is minimal improvement with rest, soft tissue massage, activity modification, and over-the-counter anti-inflammatory medications. He did receive an injection which helped some and his Ortho discussed a TKA. However, patient is holding off on any surgery at this time. Patient has limited right knee ROM, limited right LE strength, and increased right knee pain and edema with activity. Patient has limited function at this time secondary to the above.  -AS     Please refer to paper survey for additional self-reported information Yes  -AS     Rehab Potential Good  -AS     Patient/caregiver participated in establishment of treatment plan and goals Yes  -AS     Patient would benefit from skilled therapy intervention Yes  -AS        PT Plan    PT Frequency 1x/week  -AS     Predicted Duration of Therapy Intervention (PT) 4 weeks  -AS     Planned CPT's? PT RE-EVAL: 85995;PT THER PROC EA 15 MIN: 99649;PT THER ACT EA 15 MIN: 28559;PT MANUAL THERAPY EA 15 MIN: 66415;PT NEUROMUSC RE-EDUCATION EA 15 MIN: 87176;PT GAIT TRAINING EA 15 MIN: 51848  -AS               User Key  (r) = Recorded By, (t) = Taken By, (c) = Cosigned By      Initials Name Provider Type    AS Ney Eugene, PT Physical Therapist                       OP  Exercises       Row Name 09/30/24 0900             Subjective Pain    Able to rate subjective pain? yes  -AS      Pre-Treatment Pain Level 2  -AS      Post-Treatment Pain Level 2  -AS         Exercise 1    Exercise Name 1 R HS Stretch  -AS      Reps 1 10  -AS      Time 1 10 sec hold each  -AS         Exercise 2    Exercise Name 2 QS  -AS      Reps 2 25  -AS      Time 2 5 sec hold each  -AS         Exercise 3    Exercise Name 3 3-Way Hip  -AS      Reps 3 25 each  -AS         Exercise 4    Exercise Name 4 SAQ  -AS      Reps 4 25  -AS         Exercise 5    Exercise Name 5 LAQ  -AS      Reps 5 25  -AS         Exercise 6    Exercise Name 6 TKE  -AS      Reps 6 25  -AS      Time 6 Gold  -AS         Exercise 7    Exercise Name 7 Heel Raises  -AS      Reps 7 25  -AS         Exercise 8    Exercise Name 8 Mini Squats  -AS      Reps 8 25  -AS                User Key  (r) = Recorded By, (t) = Taken By, (c) = Cosigned By      Initials Name Provider Type    AS Ney Eugene, PT Physical Therapist                                  Outcome Measure Options: Lower Extremity Functional Scale (LEFS)  Lower Extremity Functional Index  Any of your usual work, housework or school activities: A little bit of difficulty  Your usual hobbies, recreational or sporting activities: No difficulty  Getting into or out of the bath: No difficulty  Walking between rooms: No difficulty  Putting on your shoes or socks: No difficulty  Squatting: No difficulty  Lifting an object, like a bag of groceries from the floor: No difficulty  Performing light activities around your home: No difficulty  Performing heavy activities around your home: No difficulty  Getting into or out of a car: No difficulty  Walking 2 blocks: A little bit of difficulty  Walking a mile: A little bit of difficulty  Going up or down 10 stairs (about 1 flight of stairs): Moderate difficulty  Standing for 1 hour: A little bit of difficulty  Sitting for 1 hour: A little bit of  difficulty  Running on even ground: A little bit of difficulty  Running on uneven ground: A little bit of difficulty  Making sharp turns while running fast: Moderate difficulty  Hopping: Moderate difficulty  Rolling over in bed: No difficulty  Total: 67  Lower Extremity Functional Index  Any of your usual work, housework or school activities: A little bit of difficulty  Your usual hobbies, recreational or sporting activities: No difficulty  Getting into or out of the bath: No difficulty  Walking between rooms: No difficulty  Putting on your shoes or socks: No difficulty  Squatting: No difficulty  Lifting an object, like a bag of groceries from the floor: No difficulty  Performing light activities around your home: No difficulty  Performing heavy activities around your home: No difficulty  Getting into or out of a car: No difficulty  Walking 2 blocks: A little bit of difficulty  Walking a mile: A little bit of difficulty  Going up or down 10 stairs (about 1 flight of stairs): Moderate difficulty  Standing for 1 hour: A little bit of difficulty  Sitting for 1 hour: A little bit of difficulty  Running on even ground: A little bit of difficulty  Running on uneven ground: A little bit of difficulty  Making sharp turns while running fast: Moderate difficulty  Hopping: Moderate difficulty  Rolling over in bed: No difficulty  Total: 67      Time Calculation:     Start Time: 0943  Stop Time: 1040  Time Calculation (min): 57 min     Therapy Charges for Today       Code Description Service Date Service Provider Modifiers Qty    [id number] HC PT EVAL LOW COMPLEXITY 4 9/30/2024 Ney Eugene, PT GP 1            PT G-Codes  Outcome Measure Options: Lower Extremity Functional Scale (LEFS)  Total: 67         Ney Eugene, PT  9/30/2024

## 2024-10-28 ENCOUNTER — HOSPITAL ENCOUNTER (OUTPATIENT)
Dept: PHYSICAL THERAPY | Facility: HOSPITAL | Age: 64
Setting detail: THERAPIES SERIES
Discharge: HOME | End: 2024-10-28
Payer: COMMERCIAL

## 2024-10-28 DIAGNOSIS — G89.29 CHRONIC PAIN OF RIGHT KNEE: Primary | ICD-10-CM

## 2024-10-28 DIAGNOSIS — M25.561 CHRONIC PAIN OF RIGHT KNEE: Primary | ICD-10-CM

## 2024-10-28 PROCEDURE — 97110 THERAPEUTIC EXERCISES: CPT

## 2024-10-28 NOTE — THERAPY TREATMENT NOTE
Outpatient Physical Therapy Ortho Treatment Note   Jewels Mehta     Patient Name: Sawyer Moraes  : 1960  MRN: [id number]  Today's Date: 10/28/2024      Visit Date: 10/28/2024    Visit Dx:    ICD-10-CM ICD-9-CM   1. Chronic pain of right knee  M25.561 719.46    G89.29 338.29       Patient Active Problem List   Diagnosis    Gastroesophageal reflux disease with esophagitis without hemorrhage    Esophageal dysphagia    History of esophageal stricture    Saavedra's esophagus    Abnormal CT scan, kidney    Gross hematuria    Ureteral mass    Renal mass        Past Medical History:   Diagnosis Date    Alcoholism     Asthma     about 6 months ago during covid had issues does not use inhaler    Saavedra esophagus     Cancer     skin cancers to back    Cervical disc disorder     Had epidural injections which helped    Cholelithiasis Removed     Colon polyp Benign- removed    Diarrhea     GERD (gastroesophageal reflux disease)     Hypertension     Kidney trouble     only has one kidney    Knee sprain     Treated at [facility] ER for knee sprain    Knee swelling     Saw Dr. Rivas Patton for knee problem.    Low back strain     Lumbosacral disc disease  herniated disk    Epdural helped. Take oxycodone for pain    Periarthritis of shoulder     Rotator cuff syndrome     Was seeing Zulay Ross [facility] ortho, have been taking Cortisone shots which help. PT. at [facility] for 6 months helped. Pain is returning.    Scoliosis 2010    Sleep apnea     uses CPAP    Tendinitis of knee 2010    Ureteral mass     Urgency of urination     frequency        Past Surgical History:   Procedure Laterality Date    COLONOSCOPY      CYSTOSCOPY RETROGRADE PYELOGRAM Right 2023    Procedure: CYSTOSCOPY BILATERAL  RETROGRADES WITH PYELOGRAM;  Surgeon: Vince Hughes MD;  Location: [facility];  Service: Urology;  Laterality: Right;    ENDOSCOPY N/A 2023    Procedure: ESOPHAGOGASTRODUODENOSCOPY with 15-18mm Balloon;   Surgeon: Quincy Tong MD;  [place] [facility] [place];  Service: Gastroenterology;  Laterality: N/A;  Ulcerative Esophagitis, duodenitis, Gastritis, Esophageal Stricture    LAPAROSCOPIC CHOLECYSTECTOMY      NEPHROURETERECTOMY Left 2/26/2024    Procedure: ROBOTIC LEFT NEPHROURETERECTOMY;  Surgeon: Akira Barnett MD;  Location: [facility] MAIN OR;  Service: Robotics - DaVinci;  Laterality: Left;    UPPER GASTROINTESTINAL ENDOSCOPY  2008                        PT Assessment/Plan       Row Name 10/28/24 0900          PT Assessment    Assessment Comments Continued with strengthening exercises today and patient continues to tolerate this well. Patient reporting improvements in his right knee symptoms. Plan to D/C patient onto HEP only at this time.  -AS        PT Plan    PT Plan Comments D/C patient onto HEP only at this time.  -AS               User Key  (r) = Recorded By, (t) = Taken By, (c) = Cosigned By      Initials Name Provider Type    AS Ney Eugene, PT Physical Therapist                       OP Exercises       Row Name 10/28/24 0956 10/28/24 0900          Subjective    Subjective Comments -- Patient states his knee is feeling better.  -AS        Total Minutes    01844 - PT Therapeutic Exercise Minutes 30  -AS --        Exercise 1    Exercise Name 1 -- R HS Stretch  -AS     Reps 1 -- 10  -AS     Time 1 -- 10 sec hold each  -AS        Exercise 2    Exercise Name 2 -- QS  -AS     Reps 2 -- 25  -AS     Time 2 -- 5 sec hold each  -AS        Exercise 3    Exercise Name 3 -- 3-Way Hip  -AS     Reps 3 -- 25 each  -AS        Exercise 4    Exercise Name 4 -- SAQ  -AS     Reps 4 -- 25  -AS        Exercise 5    Exercise Name 5 -- LAQ  -AS     Reps 5 -- 25  -AS        Exercise 6    Exercise Name 6 -- TKE  -AS     Reps 6 -- 25  -AS     Time 6 -- Gold  -AS        Exercise 7    Exercise Name 7 -- Heel Raises  -AS     Reps 7 -- 25  -AS        Exercise 8    Exercise Name 8 -- Mini Squats  -AS     Reps 8  -- 25  -AS               User Key  (r) = Recorded By, (t) = Taken By, (c) = Cosigned By      Initials Name Provider Type    AS Ney Eugene, PT Physical Therapist                                                    Time Calculation:   Start Time: 0922  Stop Time: 0956  Time Calculation (min): 34 min  Timed Charges  94571 - PT Therapeutic Exercise Minutes: 30  Total Minutes  Timed Charges Total Minutes: 30   Total Minutes: 30  Therapy Charges for Today       Code Description Service Date Service Provider Modifiers Qty    [id number]  PT THER PROC EA 15 MIN 10/28/2024 Ney Eugene, PT GP 2                      Ney Eugene, PT  10/28/2024

## 2024-11-25 ENCOUNTER — OFFICE VISIT (OUTPATIENT)
Dept: GASTROENTEROLOGY | Facility: CLINIC | Age: 64
End: 2024-11-25
Payer: COMMERCIAL

## 2024-11-25 VITALS
BODY MASS INDEX: 34 KG/M2 | WEIGHT: 251 LBS | DIASTOLIC BLOOD PRESSURE: 90 MMHG | SYSTOLIC BLOOD PRESSURE: 120 MMHG | HEIGHT: 72 IN

## 2024-11-25 DIAGNOSIS — K22.70 BARRETT'S ESOPHAGUS WITHOUT DYSPLASIA: ICD-10-CM

## 2024-11-25 DIAGNOSIS — K21.00 GASTROESOPHAGEAL REFLUX DISEASE WITH ESOPHAGITIS WITHOUT HEMORRHAGE: Primary | ICD-10-CM

## 2024-11-25 PROCEDURE — 99213 OFFICE O/P EST LOW 20 MIN: CPT

## 2024-11-25 RX ORDER — HYDROCODONE BITARTRATE AND ACETAMINOPHEN 5; 325 MG/1; MG/1
TABLET ORAL
COMMUNITY
Start: 2024-11-19

## 2024-11-25 RX ORDER — MUPIROCIN 20 MG/G
OINTMENT TOPICAL
COMMUNITY
Start: 2024-09-10

## 2024-11-25 RX ORDER — AMOXICILLIN 500 MG/1
CAPSULE ORAL
COMMUNITY
Start: 2024-11-19

## 2024-11-25 RX ORDER — VALACYCLOVIR HYDROCHLORIDE 500 MG/1
500 TABLET, FILM COATED ORAL
COMMUNITY
Start: 2024-09-16

## 2024-11-25 RX ORDER — METHYLPREDNISOLONE 4 MG/1
TABLET ORAL
COMMUNITY
Start: 2024-10-31

## 2024-11-25 RX ORDER — CHLORHEXIDINE GLUCONATE ORAL RINSE 1.2 MG/ML
SOLUTION DENTAL
COMMUNITY
Start: 2024-11-19

## 2024-11-25 RX ORDER — ALBUTEROL SULFATE 90 UG/1
2 INHALANT RESPIRATORY (INHALATION)
COMMUNITY
Start: 2024-10-31 | End: 2024-11-30

## 2024-11-25 NOTE — PROGRESS NOTES
PATIENT INFORMATION  Sawyer Moraes       - 1960    CHIEF COMPLAINT  Chief Complaint   Patient presents with    Heartburn    Saavedra's esophagus       HISTORY OF PRESENT ILLNESS  Here today for GERD follow-up     GERD: Still doing well with voquezna, occasional breakthrough which is maybe once a month if drinking something acidic or eat too fast and sucralfate relieves, no dysphagia. No OTC meds required.      Bowels are doing ok, no issues or concerns.     2023 EGD for HB and dysphagia, positive for stricture, mild chronic gastritis, erosive reflux esophagitis with barretts, no dysplasia, celiac, HP.      10/25/2021 last colon normal with hemorrhoids. Personal hx polyps.        REVIEWED PERTINENT RESULTS/ LABS  Lab Results   Component Value Date    CASEREPORT  2024     Surgical Pathology Report                         Case: [id number]                                  Authorizing Provider:  Akira Barnett MD Collected:           2024 04:09 PM          Ordering Location:     [facility]  Received:            2024 09:54 PM                                 MAIN OR                                                                      Pathologist:           Florence Williamson MD                                                    Specimens:   1) - Ureter, Left, Left kidney ureter                                                               2) - Ureter, Left, Distal ureter                                                           FINALDX  2024     1.  Ureter, left, nephro ureterectomy:   -Atrophic renal parenchyma with tubular thyroidization and dystrophic calcification.   -Dilated ureter lumen consistent with history of hydronephrosis.   -Reactive urothelial mucosa without metaplasia or dysplasia.   -Benign margins of resection.    2.  Distal ureter, left, excision:   -Benign urothelial mucosa, negative for metaplasia and dysplasia.    [facility]       Lab Results    Component Value Date    HGB 14.1 05/09/2024    MCV 89.4 05/09/2024     05/09/2024    ALT 22 02/19/2024    AST 17 02/19/2024    HGBA1C 5.7 (H) 05/08/2024    FERRITIN 102.0 05/09/2024    IRON 50 (L) 05/09/2024    TIBC 271 05/09/2024      No results found.    REVIEW OF SYSTEMS  Review of Systems   Constitutional:  Negative for activity change, chills, fever and unexpected weight change.   HENT:  Negative for congestion.    Eyes:  Negative for visual disturbance.   Respiratory:  Negative for shortness of breath.    Cardiovascular:  Negative for chest pain and palpitations.   Gastrointestinal:  Negative for abdominal pain and blood in stool.        Reflux   Endocrine: Negative for cold intolerance and heat intolerance.   Genitourinary:  Negative for hematuria.   Musculoskeletal:  Negative for gait problem.   Skin:  Negative for color change.   Allergic/Immunologic: Negative for immunocompromised state.   Neurological:  Negative for weakness and light-headedness.   Hematological:  Negative for adenopathy.   Psychiatric/Behavioral:  Negative for sleep disturbance. The patient is not nervous/anxious.          ACTIVE PROBLEMS  Patient Active Problem List    Diagnosis     Renal mass [N28.89]     Ureteral mass [N28.89]     Abnormal CT scan, kidney [R93.429]     Gross hematuria [R31.0]     History of esophageal stricture [Z87.19]     Saavedra's esophagus [K22.70]     Gastroesophageal reflux disease with esophagitis without hemorrhage [K21.00]     Esophageal dysphagia [R13.19]          PAST MEDICAL HISTORY  Past Medical History:   Diagnosis Date    Alcoholism     Asthma     about 6 months ago during covid had issues does not use inhaler    Saavedra esophagus     Cancer     skin cancers to back    Cervical disc disorder 2010    Had epidural injections which helped    Cholelithiasis Removed 2007    Colon polyp Benign- removed    Diarrhea     GERD (gastroesophageal reflux disease)     Hypertension     Kidney trouble     only  has one kidney    Knee sprain 09/23    Treated at [facility] ER for knee sprain    Knee swelling 2020    Saw Dr. Rivas Patton for knee problem.    Low back strain 2010    Lumbosacral disc disease 2015 herniated disk    Epdural helped. Take oxycodone for pain    Periarthritis of shoulder 2020    Rotator cuff syndrome 2020    Was seeing Zulay Patton ortho, have been taking Cortisone shots which help. PT. at [facility] for 6 months helped. Pain is returning.    Scoliosis 2010    Sleep apnea     uses CPAP    Tendinitis of knee 2010    Ureteral mass     Urgency of urination     frequency         SURGICAL HISTORY  Past Surgical History:   Procedure Laterality Date    COLONOSCOPY      CYSTOSCOPY RETROGRADE PYELOGRAM Right 09/11/2023    Procedure: CYSTOSCOPY BILATERAL  RETROGRADES WITH PYELOGRAM;  Surgeon: Vince Hughes MD;  Location: [facility];  Service: Urology;  Laterality: Right;    ENDOSCOPY N/A 04/04/2023    Procedure: ESOPHAGOGASTRODUODENOSCOPY with 15-18mm Balloon;  Surgeon: Quincy Tong MD;  Location: [facility];  Service: Gastroenterology;  Laterality: N/A;  Ulcerative Esophagitis, duodenitis, Gastritis, Esophageal Stricture    LAPAROSCOPIC CHOLECYSTECTOMY      NEPHROURETERECTOMY Left 2/26/2024    Procedure: ROBOTIC LEFT NEPHROURETERECTOMY;  Surgeon: Akira Barnett MD;  Location: [facility];  Service: Robotics - DaVinci;  Laterality: Left;    UPPER GASTROINTESTINAL ENDOSCOPY  2008         FAMILY HISTORY  Family History   Problem Relation Age of Onset    Diabetes Father     Heart disease Father     Malig Hyperthermia Neg Hx          SOCIAL HISTORY  Social History     Occupational History    Not on file   Tobacco Use    Smoking status: Never     Passive exposure: Never    Smokeless tobacco: Never   Vaping Use    Vaping status: Never Used   Substance and Sexual Activity    Alcohol use: Yes     Alcohol/week: 2.0 standard drinks of alcohol     Types: 2 Shots of liquor per week    Drug use:  Not Currently     Types: Oxycodone     Comment: Was in treatment for opiod addiction    Sexual activity: Yes     Partners: Female     Birth control/protection: Vasectomy         CURRENT MEDICATIONS    Current Outpatient Medications:     albuterol sulfate  (90 Base) MCG/ACT inhaler, Inhale 2 puffs., Disp: , Rfl:     amoxicillin (AMOXIL) 500 MG capsule, , Disp: , Rfl:     chlorhexidine (PERIDEX) 0.12 % solution, , Disp: , Rfl:     cyclobenzaprine (FLEXERIL) 10 MG tablet, Take 1 tablet by mouth., Disp: , Rfl:     fluticasone (FLONASE) 50 MCG/ACT nasal spray, 1 spray by Each Nare route As Needed., Disp: , Rfl:     folic acid (FOLVITE) 1 MG tablet, Take 1 tablet by mouth Daily. HOLD FOR SURGERY, Disp: , Rfl:     gabapentin (NEURONTIN) 300 MG capsule, Take 1 capsule by mouth 3 (Three) Times a Day., Disp: , Rfl:     HYDROcodone-acetaminophen (NORCO) 5-325 MG per tablet, , Disp: , Rfl:     hydrOXYzine (ATARAX) 10 MG tablet, Take 1 tablet by mouth 3 (Three) Times a Day As Needed for Itching., Disp: , Rfl:     methylPREDNISolone (MEDROL) 4 MG dose pack, , Disp: , Rfl:     metoprolol tartrate (LOPRESSOR) 25 MG tablet, Take 2 tablets by mouth 2 (Two) Times a Day As Needed (BLOOD PRESSURE)., Disp: , Rfl:     modafinil (PROVIGIL) 200 MG tablet, Take 1 tablet by mouth Daily., Disp: , Rfl:     mupirocin (BACTROBAN) 2 % ointment, , Disp: , Rfl:     NON FORMULARY, Compound pain cream, Disp: , Rfl:     sucralfate (CARAFATE) 1 g tablet, Take 1 tablet by mouth As Needed., Disp: , Rfl:     tamsulosin (FLOMAX) 0.4 MG capsule 24 hr capsule, Take 1 capsule by mouth 2 (Two) Times a Day., Disp: , Rfl:     traZODone (DESYREL) 50 MG tablet, Take 2 tablets by mouth every night at bedtime., Disp: , Rfl:     valACYclovir (VALTREX) 500 MG tablet, Take 1 tablet by mouth., Disp: , Rfl:     Vonoprazan Fumarate (Voquezna) 10 MG tablet, Take 1 tablet by mouth Daily., Disp: 90 tablet, Rfl: 3    zolpidem (AMBIEN) 10 MG tablet, 1 tablet At Night As  "Needed., Disp: , Rfl:     ALLERGIES  Aspirin, Ibuprofen, and Nsaids    VITALS  Vitals:    11/25/24 1122   BP: 120/90   BP Location: Left arm   Patient Position: Sitting   Cuff Size: Large Adult   Weight: 114 kg (251 lb)   Height: 182.9 cm (72\")       PHYSICAL EXAM  Debilities/Disabilities Identified: None  Emotional Behavior: Appropriate  Wt Readings from Last 3 Encounters:   11/25/24 114 kg (251 lb)   09/04/24 116 kg (255 lb)   04/03/24 116 kg (255 lb 12.8 oz)     Ht Readings from Last 1 Encounters:   11/25/24 182.9 cm (72\")     Body mass index is 34.04 kg/m².  Physical Exam  Constitutional:       General: He is not in acute distress.     Appearance: Normal appearance. He is not ill-appearing.   HENT:      Head: Normocephalic and atraumatic.      Mouth/Throat:      Mouth: Mucous membranes are moist.      Pharynx: No posterior oropharyngeal erythema.   Eyes:      General: No scleral icterus.  Cardiovascular:      Rate and Rhythm: Normal rate and regular rhythm.      Heart sounds: Normal heart sounds.   Pulmonary:      Effort: Pulmonary effort is normal.      Breath sounds: Normal breath sounds.   Abdominal:      General: Abdomen is flat. Bowel sounds are normal. There is no distension.      Palpations: Abdomen is soft. There is no mass.      Tenderness: There is no abdominal tenderness. There is no guarding or rebound. Negative signs include Fu's sign.      Hernia: No hernia is present.   Musculoskeletal:      Cervical back: Neck supple.   Skin:     General: Skin is warm.      Capillary Refill: Capillary refill takes less than 2 seconds.   Neurological:      General: No focal deficit present.      Mental Status: He is alert and oriented to person, place, and time.   Psychiatric:         Mood and Affect: Mood normal.         Behavior: Behavior normal.         Thought Content: Thought content normal.         Judgment: Judgment normal.       CLINICAL DATA REVIEWED   reviewed previous lab results and integrated with " today's visit, reviewed notes from other physicians and/or last GI encounter, reviewed previous endoscopy results and available photos, reviewed surgical pathology results from previous biopsies    ASSESSMENT  Diagnoses and all orders for this visit:    Gastroesophageal reflux disease with esophagitis without hemorrhage    Saavedra's esophagus without dysplasia    Other orders  -     NON FORMULARY; Compound pain cream  -     albuterol sulfate  (90 Base) MCG/ACT inhaler; Inhale 2 puffs.  -     amoxicillin (AMOXIL) 500 MG capsule  -     chlorhexidine (PERIDEX) 0.12 % solution  -     HYDROcodone-acetaminophen (NORCO) 5-325 MG per tablet  -     mupirocin (BACTROBAN) 2 % ointment  -     valACYclovir (VALTREX) 500 MG tablet; Take 1 tablet by mouth.  -     methylPREDNISolone (MEDROL) 4 MG dose pack          PLAN    Continue current meds, pretreat with sucralfate, call if worsening issues.    Return in about 1 year (around 11/25/2025).    I have discussed the above plan with the patient.  They verbalize understanding and are in agreement with the plan.  They have been advised to contact the office for any questions, concerns, or changes related to their health.

## 2025-02-13 ENCOUNTER — OFFICE VISIT (OUTPATIENT)
Dept: GASTROENTEROLOGY | Facility: CLINIC | Age: 65
End: 2025-02-13
Payer: COMMERCIAL

## 2025-02-13 VITALS
SYSTOLIC BLOOD PRESSURE: 124 MMHG | DIASTOLIC BLOOD PRESSURE: 82 MMHG | WEIGHT: 265.8 LBS | BODY MASS INDEX: 36 KG/M2 | HEIGHT: 72 IN

## 2025-02-13 DIAGNOSIS — K21.00 GASTROESOPHAGEAL REFLUX DISEASE WITH ESOPHAGITIS WITHOUT HEMORRHAGE: ICD-10-CM

## 2025-02-13 DIAGNOSIS — Z87.19 HISTORY OF ESOPHAGEAL STRICTURE: ICD-10-CM

## 2025-02-13 DIAGNOSIS — R13.19 ESOPHAGEAL DYSPHAGIA: Primary | ICD-10-CM

## 2025-02-13 RX ORDER — FAMOTIDINE 40 MG/1
40 TABLET, FILM COATED ORAL NIGHTLY
Qty: 30 TABLET | Refills: 4 | Status: SHIPPED | OUTPATIENT
Start: 2025-02-13

## 2025-02-13 NOTE — PROGRESS NOTES
PATIENT INFORMATION  Sawyer Moraes       - 1960    CHIEF COMPLAINT  Chief Complaint   Patient presents with    Difficulty Swallowing    Saavedra's esophagus    Heartburn       HISTORY OF PRESENT ILLNESS    Here today for GERD follow-up     GERD: Per LOV: Still doing well with voquezna, occasional breakthrough which is maybe once a month if drinking something acidic or eat too fast and sucralfate relieves, no dysphagia. No OTC meds required.     TODAY: Having more trouble with dysphagia, 1-2 times a week solid foods like chicken or bread are sticking, not having to cough foods up. Bad  enough he is having to stop eating. Denies HB/Indigestion.      2023 EGD for HB and dysphagia, positive for stricture, mild chronic gastritis, erosive reflux esophagitis with barretts, no dysplasia, celiac, HP.      10/25/2021 last colon normal with hemorrhoids. Personal hx polyps.      REVIEWED PERTINENT RESULTS/ LABS  Lab Results   Component Value Date    CASEREPORT  2024     Surgical Pathology Report                         Case: [id number]                                  Authorizing Provider:  Akira Barnett MD Collected:           2024 04:09 PM          Ordering Location:     [facility]  Received:            2024 09:54 PM                                 MAIN OR                                                                      Pathologist:           Florence Williamson MD                                                    Specimens:   1) - Ureter, Left, Left kidney ureter                                                               2) - Ureter, Left, Distal ureter                                                           FINALDX  2024     1.  Ureter, left, nephro ureterectomy:   -Atrophic renal parenchyma with tubular thyroidization and dystrophic calcification.   -Dilated ureter lumen consistent with history of hydronephrosis.   -Reactive urothelial mucosa  without metaplasia or dysplasia.   -Benign margins of resection.    2.  Distal ureter, left, excision:   -Benign urothelial mucosa, negative for metaplasia and dysplasia.    SWM       Lab Results   Component Value Date    HGB 14.1 05/09/2024    MCV 89.4 05/09/2024     05/09/2024    ALT 22 02/19/2024    AST 17 02/19/2024    HGBA1C 5.7 (H) 05/08/2024    FERRITIN 102.0 05/09/2024    IRON 50 (L) 05/09/2024    TIBC 271 05/09/2024      No results found.    REVIEW OF SYSTEMS  Review of Systems      ACTIVE PROBLEMS  Patient Active Problem List    Diagnosis     Renal mass [N28.89]     Ureteral mass [N28.89]     Abnormal CT scan, kidney [R93.429]     Gross hematuria [R31.0]     History of esophageal stricture [Z87.19]     Saavedra's esophagus [K22.70]     Gastroesophageal reflux disease with esophagitis without hemorrhage [K21.00]     Esophageal dysphagia [R13.19]          PAST MEDICAL HISTORY  Past Medical History:   Diagnosis Date    Alcoholism     Asthma     about 6 months ago during covid had issues does not use inhaler    Saavedra esophagus     Cancer     skin cancers to back    Cervical disc disorder 2010    Had epidural injections which helped    Cholelithiasis Removed 2007    Colon polyp Benign- removed    Diarrhea     GERD (gastroesophageal reflux disease)     Hypertension     Kidney trouble     only has one kidney    Knee sprain 09/23    Treated at [facility] for knee sprain    Knee swelling 2020    Saw Dr. Rivas Patton for knee problem.    Low back strain 2010    Lumbosacral disc disease 2015 herniated disk    Epdural helped. Take oxycodone for pain    Periarthritis of shoulder 2020    Rotator cuff syndrome 2020    Was seeing Zulay prakash, have been taking Cortisone shots which help. PT. at [facility] for 6 months helped. Pain is returning.    Scoliosis 2010    Sleep apnea     uses CPAP    Tendinitis of knee 2010    Ureteral mass     Urgency of urination     frequency         SURGICAL HISTORY  Past Surgical  History:   Procedure Laterality Date    COLONOSCOPY      CYSTOSCOPY RETROGRADE PYELOGRAM Right 2023    Procedure: CYSTOSCOPY BILATERAL  RETROGRADES WITH PYELOGRAM;  Surgeon: Vince Hughes MD;  Location[facility] OR;  Service: Urology;  Laterality: Right;    ENDOSCOPY N/A 2023    Procedure: ESOPHAGOGASTRODUODENOSCOPY with 15-18mm Balloon;  Surgeon: Quincy Tong MD;  Location: [facility] MAIN OR;  Service: Gastroenterology;  Laterality: N/A;  Ulcerative Esophagitis, duodenitis, Gastritis, Esophageal Stricture    LAPAROSCOPIC CHOLECYSTECTOMY      NEPHROURETERECTOMY Left 2024    Procedure: ROBOTIC LEFT NEPHROURETERECTOMY;  Surgeon: Akira Barnett MD;  Location: [facility] OR;  Service: Robotics - DaVinci;  Laterality: Left;    UPPER GASTROINTESTINAL ENDOSCOPY           FAMILY HISTORY  Family History   Problem Relation Age of Onset    Diabetes Father     Heart disease Father     Alcohol abuse Brother         Alcoholic ()    Malig Hyperthermia Neg Hx          SOCIAL HISTORY  Social History     Occupational History    Not on file   Tobacco Use    Smoking status: Never     Passive exposure: Never    Smokeless tobacco: Never   Vaping Use    Vaping status: Never Used   Substance and Sexual Activity    Alcohol use: Yes     Alcohol/week: 2.0 standard drinks of alcohol     Types: 2 Shots of liquor per week    Drug use: Never     Types: Oxycodone     Comment: Was in treatment for opiod addiction    Sexual activity: Yes     Partners: Female     Birth control/protection: Vasectomy         CURRENT MEDICATIONS    Current Outpatient Medications:     chlorhexidine (PERIDEX) 0.12 % solution, , Disp: , Rfl:     cyclobenzaprine (FLEXERIL) 10 MG tablet, Take 1 tablet by mouth., Disp: , Rfl:     fluticasone (FLONASE) 50 MCG/ACT nasal spray, 1 spray by Each Nare route As Needed., Disp: , Rfl:     folic acid (FOLVITE) 1 MG tablet, Take 1 tablet by mouth Daily. HOLD FOR SURGERY, Disp: , Rfl:  "    gabapentin (NEURONTIN) 300 MG capsule, Take 1 capsule by mouth 3 (Three) Times a Day., Disp: , Rfl:     hydrOXYzine (ATARAX) 10 MG tablet, Take 1 tablet by mouth 3 (Three) Times a Day As Needed for Itching., Disp: , Rfl:     modafinil (PROVIGIL) 200 MG tablet, Take 1 tablet by mouth Daily., Disp: , Rfl:     mupirocin (BACTROBAN) 2 % ointment, , Disp: , Rfl:     NON FORMULARY, Compound pain cream, Disp: , Rfl:     sucralfate (CARAFATE) 1 g tablet, Take 1 tablet by mouth As Needed., Disp: , Rfl:     tamsulosin (FLOMAX) 0.4 MG capsule 24 hr capsule, Take 1 capsule by mouth 2 (Two) Times a Day., Disp: , Rfl:     traZODone (DESYREL) 50 MG tablet, Take 2 tablets by mouth every night at bedtime., Disp: , Rfl:     valACYclovir (VALTREX) 500 MG tablet, Take 1 tablet by mouth As Needed., Disp: , Rfl:     Vonoprazan Fumarate (Voquezna) 10 MG tablet, Take 1 tablet by mouth Daily., Disp: 90 tablet, Rfl: 3    zolpidem (AMBIEN) 10 MG tablet, 1 tablet At Night As Needed., Disp: , Rfl:     famotidine (PEPCID) 40 MG tablet, Take 1 tablet by mouth Every Night., Disp: 30 tablet, Rfl: 4    ALLERGIES  Aspirin, Ibuprofen, and Nsaids    VITALS  Vitals:    02/13/25 0921   BP: 124/82   BP Location: Left arm   Patient Position: Sitting   Cuff Size: Large Adult   Weight: 121 kg (265 lb 12.8 oz)   Height: 182.9 cm (72\")       PHYSICAL EXAM  Debilities/Disabilities Identified: None  Emotional Behavior: Appropriate  Wt Readings from Last 3 Encounters:   02/13/25 121 kg (265 lb 12.8 oz)   11/25/24 114 kg (251 lb)   09/04/24 116 kg (255 lb)     Ht Readings from Last 1 Encounters:   02/13/25 182.9 cm (72\")     Body mass index is 36.05 kg/m².  Physical Exam  Constitutional:       General: He is not in acute distress.     Appearance: Normal appearance. He is not ill-appearing.   HENT:      Head: Normocephalic and atraumatic.      Mouth/Throat:      Mouth: Mucous membranes are moist.      Pharynx: No posterior oropharyngeal erythema.   Eyes:      " General: No scleral icterus.  Cardiovascular:      Rate and Rhythm: Normal rate and regular rhythm.      Heart sounds: Normal heart sounds.   Pulmonary:      Effort: Pulmonary effort is normal.      Breath sounds: Normal breath sounds.   Abdominal:      General: Abdomen is flat. Bowel sounds are normal. There is no distension.      Palpations: Abdomen is soft. There is no mass.      Tenderness: There is no abdominal tenderness. There is no guarding or rebound. Negative signs include Fu's sign.      Hernia: No hernia is present.   Musculoskeletal:      Cervical back: Neck supple.   Skin:     General: Skin is warm.      Capillary Refill: Capillary refill takes less than 2 seconds.   Neurological:      General: No focal deficit present.      Mental Status: He is alert and oriented to person, place, and time.   Psychiatric:         Mood and Affect: Mood normal.         Behavior: Behavior normal.         Thought Content: Thought content normal.         Judgment: Judgment normal.         CLINICAL DATA REVIEWED   reviewed previous lab results and integrated with today's visit, reviewed notes from other physicians and/or last GI encounter, reviewed previous endoscopy results and available photos, reviewed surgical pathology results from previous biopsies    ASSESSMENT  Diagnoses and all orders for this visit:    Esophageal dysphagia  -     Case Request; Standing  -     Case Request    Gastroesophageal reflux disease with esophagitis without hemorrhage    History of esophageal stricture    Other orders  -     Follow Anesthesia Guidelines / Protocol; Future  -     famotidine (PEPCID) 40 MG tablet; Take 1 tablet by mouth Every Night.          PLAN    EGD to assess for recurrent stricture  Continue Voquezna, will add famotidine at night  10/2028 next colonoscopy    Return in about 3 months (around 5/13/2025).    I have discussed the above plan with the patient.  They verbalize understanding and are in agreement with the  plan.  They have been advised to contact the office for any questions, concerns, or changes related to their health.

## 2025-02-13 NOTE — H&P (VIEW-ONLY)
PATIENT INFORMATION  Sawyer Moraes       - 1960    CHIEF COMPLAINT  Chief Complaint   Patient presents with    Difficulty Swallowing    Saavedra's esophagus    Heartburn       HISTORY OF PRESENT ILLNESS    Here today for GERD follow-up     GERD: Per LOV: Still doing well with voquezna, occasional breakthrough which is maybe once a month if drinking something acidic or eat too fast and sucralfate relieves, no dysphagia. No OTC meds required.     TODAY: Having more trouble with dysphagia, 1-2 times a week solid foods like chicken or bread are sticking, not having to cough foods up. Bad  enough he is having to stop eating. Denies HB/Indigestion.      2023 EGD for HB and dysphagia, positive for stricture, mild chronic gastritis, erosive reflux esophagitis with barretts, no dysplasia, celiac, HP.      10/25/2021 last colon normal with hemorrhoids. Personal hx polyps.      REVIEWED PERTINENT RESULTS/ LABS  Lab Results   Component Value Date    CASEREPORT  2024     Surgical Pathology Report                         Case: [id number]                                  Authorizing Provider:  Akira Barnett MD Collected:           2024 04:09 PM          Ordering Location:     [facility]  Received:            2024 09:54 PM                                 MAIN OR                                                                      Pathologist:           Florence Williamson MD                                                    Specimens:   1) - Ureter, Left, Left kidney ureter                                                               2) - Ureter, Left, Distal ureter                                                           FINALDX  2024     1.  Ureter, left, nephro ureterectomy:   -Atrophic renal parenchyma with tubular thyroidization and dystrophic calcification.   -Dilated ureter lumen consistent with history of hydronephrosis.   -Reactive urothelial mucosa  without metaplasia or dysplasia.   -Benign margins of resection.    2.  Distal ureter, left, excision:   -Benign urothelial mucosa, negative for metaplasia and dysplasia.    SWM       Lab Results   Component Value Date    HGB 14.1 05/09/2024    MCV 89.4 05/09/2024     05/09/2024    ALT 22 02/19/2024    AST 17 02/19/2024    HGBA1C 5.7 (H) 05/08/2024    FERRITIN 102.0 05/09/2024    IRON 50 (L) 05/09/2024    TIBC 271 05/09/2024      No results found.    REVIEW OF SYSTEMS  Review of Systems      ACTIVE PROBLEMS  Patient Active Problem List    Diagnosis     Renal mass [N28.89]     Ureteral mass [N28.89]     Abnormal CT scan, kidney [R93.429]     Gross hematuria [R31.0]     History of esophageal stricture [Z87.19]     Saavedra's esophagus [K22.70]     Gastroesophageal reflux disease with esophagitis without hemorrhage [K21.00]     Esophageal dysphagia [R13.19]          PAST MEDICAL HISTORY  Past Medical History:   Diagnosis Date    Alcoholism     Asthma     about 6 months ago during covid had issues does not use inhaler    Saavedra esophagus     Cancer     skin cancers to back    Cervical disc disorder 2010    Had epidural injections which helped    Cholelithiasis Removed 2007    Colon polyp Benign- removed    Diarrhea     GERD (gastroesophageal reflux disease)     Hypertension     Kidney trouble     only has one kidney    Knee sprain 09/23    Treated at [facility] for knee sprain    Knee swelling 2020    Saw Dr. Rivas Patton for knee problem.    Low back strain 2010    Lumbosacral disc disease 2015 herniated disk    Epdural helped. Take oxycodone for pain    Periarthritis of shoulder 2020    Rotator cuff syndrome 2020    Was seeing Zulay prakash, have been taking Cortisone shots which help. PT. at [facility] for 6 months helped. Pain is returning.    Scoliosis 2010    Sleep apnea     uses CPAP    Tendinitis of knee 2010    Ureteral mass     Urgency of urination     frequency         SURGICAL HISTORY  Past Surgical  History:   Procedure Laterality Date    COLONOSCOPY      CYSTOSCOPY RETROGRADE PYELOGRAM Right 2023    Procedure: CYSTOSCOPY BILATERAL  RETROGRADES WITH PYELOGRAM;  Surgeon: Vince Hughes MD;  Location: [facility] OR;  Service: Urology;  Laterality: Right;    ENDOSCOPY N/A 2023    Procedure: ESOPHAGOGASTRODUODENOSCOPY with 15-18mm Balloon;  Surgeon: Quincy Tong MD;  [place] [facility] [place];  Service: Gastroenterology;  Laterality: N/A;  Ulcerative Esophagitis, duodenitis, Gastritis, Esophageal Stricture    LAPAROSCOPIC CHOLECYSTECTOMY      NEPHROURETERECTOMY Left 2024    Procedure: ROBOTIC LEFT NEPHROURETERECTOMY;  Surgeon: Akira Barnett MD;  Location: [facility] OR;  Service: Robotics - DaVinci;  Laterality: Left;    UPPER GASTROINTESTINAL ENDOSCOPY           FAMILY HISTORY  Family History   Problem Relation Age of Onset    Diabetes Father     Heart disease Father     Alcohol abuse Brother         Alcoholic ()    Malig Hyperthermia Neg Hx          SOCIAL HISTORY  Social History     Occupational History    Not on file   Tobacco Use    Smoking status: Never     Passive exposure: Never    Smokeless tobacco: Never   Vaping Use    Vaping status: Never Used   Substance and Sexual Activity    Alcohol use: Yes     Alcohol/week: 2.0 standard drinks of alcohol     Types: 2 Shots of liquor per week    Drug use: Never     Types: Oxycodone     Comment: Was in treatment for opiod addiction    Sexual activity: Yes     Partners: Female     Birth control/protection: Vasectomy         CURRENT MEDICATIONS    Current Outpatient Medications:     chlorhexidine (PERIDEX) 0.12 % solution, , Disp: , Rfl:     cyclobenzaprine (FLEXERIL) 10 MG tablet, Take 1 tablet by mouth., Disp: , Rfl:     fluticasone (FLONASE) 50 MCG/ACT nasal spray, 1 spray by Each Nare route As Needed., Disp: , Rfl:     folic acid (FOLVITE) 1 MG tablet, Take 1 tablet by mouth Daily. HOLD FOR SURGERY, Disp: , Rfl:  "    gabapentin (NEURONTIN) 300 MG capsule, Take 1 capsule by mouth 3 (Three) Times a Day., Disp: , Rfl:     hydrOXYzine (ATARAX) 10 MG tablet, Take 1 tablet by mouth 3 (Three) Times a Day As Needed for Itching., Disp: , Rfl:     modafinil (PROVIGIL) 200 MG tablet, Take 1 tablet by mouth Daily., Disp: , Rfl:     mupirocin (BACTROBAN) 2 % ointment, , Disp: , Rfl:     NON FORMULARY, Compound pain cream, Disp: , Rfl:     sucralfate (CARAFATE) 1 g tablet, Take 1 tablet by mouth As Needed., Disp: , Rfl:     tamsulosin (FLOMAX) 0.4 MG capsule 24 hr capsule, Take 1 capsule by mouth 2 (Two) Times a Day., Disp: , Rfl:     traZODone (DESYREL) 50 MG tablet, Take 2 tablets by mouth every night at bedtime., Disp: , Rfl:     valACYclovir (VALTREX) 500 MG tablet, Take 1 tablet by mouth As Needed., Disp: , Rfl:     Vonoprazan Fumarate (Voquezna) 10 MG tablet, Take 1 tablet by mouth Daily., Disp: 90 tablet, Rfl: 3    zolpidem (AMBIEN) 10 MG tablet, 1 tablet At Night As Needed., Disp: , Rfl:     famotidine (PEPCID) 40 MG tablet, Take 1 tablet by mouth Every Night., Disp: 30 tablet, Rfl: 4    ALLERGIES  Aspirin, Ibuprofen, and Nsaids    VITALS  Vitals:    02/13/25 0921   BP: 124/82   BP Location: Left arm   Patient Position: Sitting   Cuff Size: Large Adult   Weight: 121 kg (265 lb 12.8 oz)   Height: 182.9 cm (72\")       PHYSICAL EXAM  Debilities/Disabilities Identified: None  Emotional Behavior: Appropriate  Wt Readings from Last 3 Encounters:   02/13/25 121 kg (265 lb 12.8 oz)   11/25/24 114 kg (251 lb)   09/04/24 116 kg (255 lb)     Ht Readings from Last 1 Encounters:   02/13/25 182.9 cm (72\")     Body mass index is 36.05 kg/m².  Physical Exam  Constitutional:       General: He is not in acute distress.     Appearance: Normal appearance. He is not ill-appearing.   HENT:      Head: Normocephalic and atraumatic.      Mouth/Throat:      Mouth: Mucous membranes are moist.      Pharynx: No posterior oropharyngeal erythema.   Eyes:      " General: No scleral icterus.  Cardiovascular:      Rate and Rhythm: Normal rate and regular rhythm.      Heart sounds: Normal heart sounds.   Pulmonary:      Effort: Pulmonary effort is normal.      Breath sounds: Normal breath sounds.   Abdominal:      General: Abdomen is flat. Bowel sounds are normal. There is no distension.      Palpations: Abdomen is soft. There is no mass.      Tenderness: There is no abdominal tenderness. There is no guarding or rebound. Negative signs include Fu's sign.      Hernia: No hernia is present.   Musculoskeletal:      Cervical back: Neck supple.   Skin:     General: Skin is warm.      Capillary Refill: Capillary refill takes less than 2 seconds.   Neurological:      General: No focal deficit present.      Mental Status: He is alert and oriented to person, place, and time.   Psychiatric:         Mood and Affect: Mood normal.         Behavior: Behavior normal.         Thought Content: Thought content normal.         Judgment: Judgment normal.         CLINICAL DATA REVIEWED   reviewed previous lab results and integrated with today's visit, reviewed notes from other physicians and/or last GI encounter, reviewed previous endoscopy results and available photos, reviewed surgical pathology results from previous biopsies    ASSESSMENT  Diagnoses and all orders for this visit:    Esophageal dysphagia  -     Case Request; Standing  -     Case Request    Gastroesophageal reflux disease with esophagitis without hemorrhage    History of esophageal stricture    Other orders  -     Follow Anesthesia Guidelines / Protocol; Future  -     famotidine (PEPCID) 40 MG tablet; Take 1 tablet by mouth Every Night.          PLAN    EGD to assess for recurrent stricture  Continue Voquezna, will add famotidine at night  10/2028 next colonoscopy    Return in about 3 months (around 5/13/2025).    I have discussed the above plan with the patient.  They verbalize understanding and are in agreement with the  plan.  They have been advised to contact the office for any questions, concerns, or changes related to their health.

## 2025-02-25 ENCOUNTER — ANESTHESIA EVENT (OUTPATIENT)
Dept: PERIOP | Facility: HOSPITAL | Age: 65
End: 2025-02-25
Payer: COMMERCIAL

## 2025-02-26 ENCOUNTER — HOSPITAL ENCOUNTER (OUTPATIENT)
Facility: HOSPITAL | Age: 65
Setting detail: HOSPITAL OUTPATIENT SURGERY
Discharge: HOME | End: 2025-02-26
Attending: INTERNAL MEDICINE | Admitting: INTERNAL MEDICINE
Payer: COMMERCIAL

## 2025-02-26 ENCOUNTER — ANESTHESIA (OUTPATIENT)
Dept: PERIOP | Facility: HOSPITAL | Age: 65
End: 2025-02-26
Payer: COMMERCIAL

## 2025-02-26 VITALS
OXYGEN SATURATION: 97 % | TEMPERATURE: 95.6 F | RESPIRATION RATE: 18 BRPM | HEART RATE: 76 BPM | WEIGHT: 258 LBS | DIASTOLIC BLOOD PRESSURE: 105 MMHG | SYSTOLIC BLOOD PRESSURE: 147 MMHG | BODY MASS INDEX: 34.99 KG/M2

## 2025-02-26 DIAGNOSIS — R13.19 ESOPHAGEAL DYSPHAGIA: ICD-10-CM

## 2025-02-26 PROCEDURE — 43249 ESOPH EGD DILATION <30 MM: CPT | Performed by: INTERNAL MEDICINE

## 2025-02-26 PROCEDURE — 43239 EGD BIOPSY SINGLE/MULTIPLE: CPT | Performed by: INTERNAL MEDICINE

## 2025-02-26 PROCEDURE — 25010000002 PROPOFOL 200 MG/20ML EMULSION: Performed by: NURSE ANESTHETIST, CERTIFIED REGISTERED

## 2025-02-26 PROCEDURE — 88342 IMHCHEM/IMCYTCHM 1ST ANTB: CPT | Performed by: INTERNAL MEDICINE

## 2025-02-26 PROCEDURE — 88305 TISSUE EXAM BY PATHOLOGIST: CPT | Performed by: INTERNAL MEDICINE

## 2025-02-26 PROCEDURE — 88341 IMHCHEM/IMCYTCHM EA ADD ANTB: CPT | Performed by: INTERNAL MEDICINE

## 2025-02-26 PROCEDURE — C1726 CATH, BAL DIL, NON-VASCULAR: HCPCS | Performed by: INTERNAL MEDICINE

## 2025-02-26 PROCEDURE — 25010000002 LIDOCAINE 2% SOLUTION: Performed by: NURSE ANESTHETIST, CERTIFIED REGISTERED

## 2025-02-26 RX ORDER — SODIUM CHLORIDE, SODIUM LACTATE, POTASSIUM CHLORIDE, CALCIUM CHLORIDE 600; 310; 30; 20 MG/100ML; MG/100ML; MG/100ML; MG/100ML
9 INJECTION, SOLUTION INTRAVENOUS CONTINUOUS
Status: DISCONTINUED | OUTPATIENT
Start: 2025-02-26 | End: 2025-02-26 | Stop reason: HOSPADM

## 2025-02-26 RX ORDER — SODIUM CHLORIDE 0.9 % (FLUSH) 0.9 %
10 SYRINGE (ML) INJECTION AS NEEDED
Status: DISCONTINUED | OUTPATIENT
Start: 2025-02-26 | End: 2025-02-26 | Stop reason: HOSPADM

## 2025-02-26 RX ORDER — LIDOCAINE HYDROCHLORIDE 20 MG/ML
INJECTION, SOLUTION INFILTRATION; PERINEURAL AS NEEDED
Status: DISCONTINUED | OUTPATIENT
Start: 2025-02-26 | End: 2025-02-26 | Stop reason: SURG

## 2025-02-26 RX ORDER — ONDANSETRON 2 MG/ML
4 INJECTION INTRAMUSCULAR; INTRAVENOUS ONCE AS NEEDED
Status: DISCONTINUED | OUTPATIENT
Start: 2025-02-26 | End: 2025-02-26 | Stop reason: HOSPADM

## 2025-02-26 RX ORDER — PROPOFOL 10 MG/ML
INJECTION, EMULSION INTRAVENOUS AS NEEDED
Status: DISCONTINUED | OUTPATIENT
Start: 2025-02-26 | End: 2025-02-26 | Stop reason: SURG

## 2025-02-26 RX ORDER — SODIUM CHLORIDE 9 MG/ML
40 INJECTION, SOLUTION INTRAVENOUS AS NEEDED
Status: DISCONTINUED | OUTPATIENT
Start: 2025-02-26 | End: 2025-02-26 | Stop reason: HOSPADM

## 2025-02-26 RX ORDER — SODIUM CHLORIDE 0.9 % (FLUSH) 0.9 %
10 SYRINGE (ML) INJECTION EVERY 12 HOURS SCHEDULED
Status: DISCONTINUED | OUTPATIENT
Start: 2025-02-26 | End: 2025-02-26 | Stop reason: HOSPADM

## 2025-02-26 RX ORDER — DIPHENHYDRAMINE HYDROCHLORIDE 50 MG/ML
12.5 INJECTION INTRAMUSCULAR; INTRAVENOUS
Status: DISCONTINUED | OUTPATIENT
Start: 2025-02-26 | End: 2025-02-26 | Stop reason: HOSPADM

## 2025-02-26 RX ORDER — LIDOCAINE HYDROCHLORIDE 10 MG/ML
0.5 INJECTION, SOLUTION EPIDURAL; INFILTRATION; INTRACAUDAL; PERINEURAL ONCE AS NEEDED
Status: DISCONTINUED | OUTPATIENT
Start: 2025-02-26 | End: 2025-02-26 | Stop reason: HOSPADM

## 2025-02-26 RX ADMIN — PROPOFOL 50 MG: 10 INJECTION, EMULSION INTRAVENOUS at 14:53

## 2025-02-26 RX ADMIN — PROPOFOL 100 MG: 10 INJECTION, EMULSION INTRAVENOUS at 14:50

## 2025-02-26 RX ADMIN — PROPOFOL 50 MG: 10 INJECTION, EMULSION INTRAVENOUS at 14:55

## 2025-02-26 RX ADMIN — LIDOCAINE HYDROCHLORIDE 100 MG: 20 INJECTION, SOLUTION INFILTRATION; PERINEURAL at 14:50

## 2025-02-26 RX ADMIN — PROPOFOL 50 MG: 10 INJECTION, EMULSION INTRAVENOUS at 14:51

## 2025-02-26 NOTE — LETTER
February 26, 2025     Patient: Sawyer Moraes   YOB: 1960   Date of Visit: 2/13/2025       To Whom It May Concern:    Sawyer Moraes was at our facility on 2/26/2025, and may return to work on 02/27/2025 .           Sincerely,        Aileen RAMOS for Dr. Togn

## 2025-02-26 NOTE — ANESTHESIA PREPROCEDURE EVALUATION
Anesthesia Evaluation     Patient summary reviewed and Nursing notes reviewed   no history of anesthetic complications:   NPO Solid Status: > 8 hours  NPO Liquid Status: > 8 hours           Airway   Mallampati: III  TM distance: >3 FB  Neck ROM: full  No difficulty expected  Dental          Pulmonary - normal exam    breath sounds clear to auscultation  (+) asthma,sleep apnea  Cardiovascular - normal exam  Exercise tolerance: good (4-7 METS)    ECG reviewed  Rhythm: regular  Rate: normal    (+) hypertension    ROS comment: EKG 8/28/23:  - OTHERWISE NORMAL ECG -  Sinus rhythm  Atrial premature complex  NO PRIOR TRACING AVAILABLE FOR COMPARISON      Neuro/Psych- negative ROS  GI/Hepatic/Renal/Endo    (+) obesity, GERD well controlled, renal disease (L kidney is atrophic, but L ureteral mass.)-  (-) liver disease, diabetes, no thyroid disorder    Musculoskeletal     (+) back pain  Abdominal   (+) obese   Substance History   (+) alcohol use (once a week)      Comment: Prior hx opioid addiction. Not requiring maintenance therapy. Does still take vicodin for back pain sometimes.    OB/GYN          Other      history of cancer (skin)                      Anesthesia Plan    ASA 3     MAC     intravenous induction     Anesthetic plan, risks, benefits, and alternatives have been provided, discussed and informed consent has been obtained with: patient.  Pre-procedure education provided  Use of blood products discussed with patient  Consented to blood products.    Plan discussed with CRNA.        CODE STATUS:

## 2025-02-26 NOTE — BRIEF OP NOTE
ESOPHAGOGASTRODUODENOSCOPY WITH DILATATION  Progress Note    Sawyer ALISIA Tors  2/26/2025    Pre-op Diagnosis:   Esophageal dysphagia [R13.19]       Post-Op Diagnosis Codes:     * Esophageal dysphagia [R13.19]     * Gastritis [K29.70]     * Reflux esophagitis [K21.00]     * Esophageal polyp [K22.81]    Procedure(s):      Procedure(s):  ESOPHAGOGASTRODUODENOSCOPY WITH DILATATION and biopsy and polypectomy              Surgeon(s):  Quincy Tong MD    Anesthesia: Monitored Anesthesia Care    Staff:   Circulator: Harshad Davis RN  Scrub Person: Mario Ballard       Estimated Blood Loss: none    Urine Voided: * No values recorded between 2/26/2025  2:44 PM and 2/26/2025  3:02 PM *    Specimens:                Specimens       ID Source Type Tests Collected By Collected At Frozen?    A Gastric, Antrum Tissue TISSUE PATHOLOGY EXAM   Quincy Tong MD 2/26/25 1459 No    This specimen was not marked as sent.    B Esophagus, Distal Tissue TISSUE PATHOLOGY EXAM   Quincy Tong MD 2/26/25 1500 No    This specimen was not marked as sent.    C Esophagus Tissue TISSUE PATHOLOGY EXAM   Quincy Tong MD 2/26/25 1503 No    Description: esophageal polyp    This specimen was not marked as sent.              Drains:   [REMOVED] Urethral Catheter Straight-tip;Silicone;Non-latex 16 Fr. (Removed)       Findings: Normal Duodenum  Mild Gastritis-Biopsy  Reflux Esophagitis-Biopsy  No Stricture( attempted 18 mm Dilation )  Proximal Esophageal Polyp(Gastric rest )-Biopsy      Complications: None          Quincy Tong MD     Date: 2/26/2025  Time: 15:04 EST

## 2025-02-26 NOTE — ANESTHESIA POSTPROCEDURE EVALUATION
Patient: Sawyer CRUMP Tors    Procedure Summary       Date: 02/26/25 Room / Location:  [facility] /  [facility]    Anesthesia Start: 1444 Anesthesia Stop: 1505    Procedure: ESOPHAGOGASTRODUODENOSCOPY WITH DILATATION and biopsy and polypectomy (Esophagus) Diagnosis:       Esophageal dysphagia      Gastritis      Reflux esophagitis      Esophageal polyp      (Esophageal dysphagia [R13.19])    Surgeons: Quincy Tong MD Provider: Alexandrea Mensah CRNA    Anesthesia Type: MAC ASA Status: 3            Anesthesia Type: MAC    Vitals  Vitals Value Taken Time   /105 02/26/25 1535   Temp 95.6 °F (35.3 °C) 02/26/25 1507   Pulse 81 02/26/25 1540   Resp 18 02/26/25 1540   SpO2 97 % 02/26/25 1540   Vitals shown include unfiled device data.        Post Anesthesia Care and Evaluation      Comments: Pt discharged without notifying anesthesia

## 2025-02-26 NOTE — INTERVAL H&P NOTE
Vital Signs  Temp 98.2 °F (36.8 °C) (Oral)   Wt 117 kg (258 lb)   BMI 34.99 kg/m²     H&P reviewed. The patient was examined and there are no changes to the H&P.

## 2025-03-03 LAB
CYTO UR: NORMAL
LAB AP CASE REPORT: NORMAL
LAB AP SPECIAL STAINS: NORMAL
PATH REPORT.FINAL DX SPEC: NORMAL
PATH REPORT.GROSS SPEC: NORMAL

## 2025-03-12 ENCOUNTER — OFFICE VISIT (OUTPATIENT)
Dept: ORTHOPEDIC SURGERY | Facility: CLINIC | Age: 65
End: 2025-03-12
Payer: COMMERCIAL

## 2025-03-12 VITALS
HEIGHT: 72 IN | DIASTOLIC BLOOD PRESSURE: 90 MMHG | SYSTOLIC BLOOD PRESSURE: 161 MMHG | HEART RATE: 91 BPM | BODY MASS INDEX: 34.95 KG/M2 | WEIGHT: 258 LBS

## 2025-03-12 DIAGNOSIS — M17.11 PATELLOFEMORAL ARTHRITIS OF RIGHT KNEE: ICD-10-CM

## 2025-03-12 DIAGNOSIS — M94.261 CHONDROMALACIA OF KNEE, RIGHT: ICD-10-CM

## 2025-03-12 DIAGNOSIS — M94.262 CHONDROMALACIA OF KNEE, LEFT: ICD-10-CM

## 2025-03-12 DIAGNOSIS — M25.562 LEFT KNEE PAIN, UNSPECIFIED CHRONICITY: Primary | ICD-10-CM

## 2025-03-12 RX ORDER — AMOXICILLIN 500 MG/1
CAPSULE ORAL
COMMUNITY
Start: 2025-03-03 | End: 2025-04-07

## 2025-03-12 RX ADMIN — LIDOCAINE HYDROCHLORIDE 8 ML: 10 INJECTION, SOLUTION EPIDURAL; INFILTRATION; INTRACAUDAL; PERINEURAL at 09:16

## 2025-03-12 RX ADMIN — TRIAMCINOLONE ACETONIDE 80 MG: 40 INJECTION, SUSPENSION INTRA-ARTICULAR; INTRAMUSCULAR at 09:16

## 2025-03-12 NOTE — PROGRESS NOTES
Subjective:     Patient ID: Sawyer Moraes is a 64 y.o. male.    Chief Complaint:  Left knee pain, new issue.    History of Present Illness  History of Present Illness  The patient presents to the clinic today for evaluation of left knee pain, which is a new issue, but also moderate recurrence of right knee pain.    He had a previous injection to his right knee back in 09/2024, which provided relief for about 4 months. The left knee pain started in 01/2025. He notes moderate intensity aching in nature localized to the medial and anterior aspect of bilateral knees. He rates the pain as a 5 to 8 out of 10 and describes some associated stabbing pain. He does not report any nikole locking but does have some occasional catching sensation. The pain is worse particularly with climbing stairs as well as prolonged walking, weightbearing, and deep flexion activities. He reports mild swelling that waxes and wanes. There was moderate improvement with a previous injection on the right knee and mild improvement with rest, anti-inflammatory medications, and oral steroids on the left side as well. He does not report any fevers, chills, sweats, numbness, tingling, or hip or groin pain.     Social History     Occupational History    Not on file   Tobacco Use    Smoking status: Never     Passive exposure: Never    Smokeless tobacco: Never   Vaping Use    Vaping status: Never Used   Substance and Sexual Activity    Alcohol use: Yes     Alcohol/week: 2.0 standard drinks of alcohol     Types: 2 Shots of liquor per week     Comment: WEEKLY    Drug use: Not Currently     Types: Oxycodone     Comment: Was in treatment for opiod addiction    Sexual activity: Yes     Partners: Female     Birth control/protection: Vasectomy      Past Medical History:   Diagnosis Date    Alcoholism     Asthma     about 6 months ago during covid had issues does not use inhaler    Saavedra esophagus     Cancer     skin cancers to back    Cervical disc disorder 2010     Had epidural injections which helped    Cholelithiasis Removed 2007    Colon polyp Benign- removed    Diarrhea     Frozen shoulder     GERD (gastroesophageal reflux disease)     Hypertension     Kidney trouble     only has one kidney    Knee sprain 09/23    Treated at [facility] for knee sprain    Knee swelling 2020    Saw Dr. Rivas Patton for knee problem.    Low back strain 2010    Lumbosacral disc disease 2015 herniated disk    Epdural helped. Take oxycodone for pain    Periarthritis of shoulder 2020    Rotator cuff syndrome 2020    Was seeing Zulay Patton ortho, have been taking Cortisone shots which help. PT. at [facility] for 6 months helped. Pain is returning.    Scoliosis 2010    Sleep apnea     uses CPAP    Tendinitis of knee 2010    Ureteral mass     Urgency of urination     frequency     Past Surgical History:   Procedure Laterality Date    COLONOSCOPY      CYSTOSCOPY RETROGRADE PYELOGRAM Right 09/11/2023    Procedure: CYSTOSCOPY BILATERAL  RETROGRADES WITH PYELOGRAM;  Surgeon: Vince Hughes MD;  Location: [facility] OR;  Service: Urology;  Laterality: Right;    ENDOSCOPY N/A 04/04/2023    Procedure: ESOPHAGOGASTRODUODENOSCOPY with 15-18mm Balloon;  Surgeon: Quincy Tong MD;  Location: [facility] OR;  Service: Gastroenterology;  Laterality: N/A;  Ulcerative Esophagitis, duodenitis, Gastritis, Esophageal Stricture    ENDOSCOPY N/A 2/26/2025    Procedure: ESOPHAGOGASTRODUODENOSCOPY WITH DILATATION and biopsy and polypectomy;  Surgeon: Quincy Tong MD;  Location: [facility] OR;  Service: Gastroenterology;  Laterality: N/A;  esophageal polyp, gastric antrum biopsy x1, distal esophagus biopsy x1, esophagitis, gastritis    LAPAROSCOPIC CHOLECYSTECTOMY      NEPHROURETERECTOMY Left 2/26/2024    Procedure: ROBOTIC LEFT NEPHROURETERECTOMY;  Surgeon: Akira Barnett MD;  Location: [facility] OR;  Service: Robotics - DaVinci;  Laterality: Left;    UPPER GASTROINTESTINAL ENDOSCOPY   "       Family History   Problem Relation Age of Onset    Diabetes Father     Heart disease Father     Alcohol abuse Brother             Alcohol abuse Mother     Malig Hyperthermia Neg Hx          Review of Systems        Objective:  Vitals:    25 0825   BP: 161/90   Pulse: 91   Weight: 117 kg (258 lb)   Height: 182.9 cm (72\")         25  0825   Weight: 117 kg (258 lb)     Body mass index is 34.99 kg/m².  Physical Exam    Vital signs reviewed.   General: No acute distress, alert and oriented  Eyes: conjunctiva clear; pupils equally round and reactive  ENT: external ears and nose atraumatic; oropharynx clear  CV: no peripheral edema  Resp: normal respiratory effort  Skin: no rashes or wounds; normal turgor  Psych: mood and affect appropriate; recent and remote memory intact          Physical Exam  Bilateral knees have an active range of motion from 0 to 125 degrees, with 4+ out of 5 strength on flexion and extension. Maximal tenderness is noted upon palpation of the medial patellar facet, along with a positive active patellar compression test. Mild tenderness is present along the medial joint line. Griselda's test is mildly positive with pain, but no click. The knees are stable to varus and valgus stress at 0 and 30 degrees, with a grade 1 Lachman test, and negative anterior and posterior drawer tests. Mild effusion is noted in the knees.         Imaging:  Bilateral Knee X-Ray  Indication: Pain    AP, Lateral, and [place] views    Findings:  No fracture  No bony lesion  Normal soft tissues  Moderate medial compartment joint space narrowing bilaterally with slight varus alignment, minimal reactive osteophyte formation.  Mild patellofemoral joint space narrowing.  Compared to prior office x-rays of right  Assessment:        1. Left knee pain, unspecified chronicity    2. Chondromalacia of knee, right    3. Patellofemoral arthritis of right knee    4. Chondromalacia of knee, left       "     Plan:  - Large Joint Arthrocentesis: bilateral knee on 3/12/2025 9:16 AM  Indications: pain  Details: 22 G needle, anterolateral approach  Medications (Right): 8 mL lidocaine PF 1% 1 %; 80 mg triamcinolone acetonide 40 MG/ML  Medications (Left): 8 mL lidocaine PF 1% 1 %; 80 mg triamcinolone acetonide 40 MG/ML  Outcome: tolerated well, no immediate complications  Procedure, treatment alternatives, risks and benefits explained, specific risks discussed. Consent was given by the patient. Immediately prior to procedure a time out was called to verify the correct patient, procedure, equipment, support staff and site/side marked as required. Patient was prepped and draped in the usual sterile fashion.               Assessment & Plan  1. Bilateral knee pain.  He presents with a new issue of left knee pain and a moderate recurrence of right knee pain. The left knee pain started in January and is described as moderate intensity, aching in nature, localized to the medial and anterior aspects of both knees, rated 5-8 out of 10. He also reports associated stabbing pain, occasional catching sensation, and mild swelling that waxes and wanes. The pain worsens with climbing stairs, prolonged walking, weightbearing, and deep flexion activities. There is moderate improvement with previous injection on the right knee and mild improvement with rest and anti-inflammatory medications as well as oral steroids on the left side. He does not report any fevers, chills, sweats, numbness, tingling, or hip or groin pain. Physical examination reveals active range of motion 0 to 125 degrees, 4+ out of 5 strength on flexion and extension, maximal tenderness to palpation at the medial patellar facet, positive active patellar compression test, mild tenderness at the medial joint line, mildly positive Griselda's with pain but no click, stable to varus and valgus stress, grade 1 Lachman, negative anterior and posterior drawer, and mild effusion.      He has some evidence of chondromalacia of bilateral knees, particularly patellofemoral joints, which seems to be causing him the most symptoms. He was happy with response from right knee injection previously. He would like to proceed with that again today to both knees at this time. He will receive injections in both knees today. He will continue with home exercises to work on hip, core, and quad strengthening as tolerated. If there is little or limited improvement, advanced imaging may be considered.    Patient would like to proceed with cortisone injection today to the bilateral knees. Recommended limited use of affected extremity for the next 24 hours to only essential activites other than work on general active and passive motion. Recommended supplementing with ice and soft tissue massage. Discussed with patient that they should see results in 5-7 days, if no improvement in 5-6 weeks I have asked them to call the office to review other options. Patient should call office immediately if they notice redness, warmth, fevers, chills, or residual numbness or tingling for greater than 6 hours after injection.       Sawyer Moraes was in agreement with plan and had all questions answered.     Orders:  Orders Placed This Encounter   Procedures    - Large Joint Arthrocentesis: bilateral knee       Medications:  No orders of the defined types were placed in this encounter.      Followup:  No follow-ups on file.    Diagnoses and all orders for this visit:    1. Left knee pain, unspecified chronicity (Primary)  -     Cancel: XR Knee 3+ View With Sunrise Left    2. Chondromalacia of knee, right    3. Patellofemoral arthritis of right knee    4. Chondromalacia of knee, left    Other orders  -     - Large Joint Arthrocentesis: bilateral knee                  Dictated utilizing Dragon dictation     Patient or patient representative verbalized consent for the use of Ambient Listening during the visit with  Steve Altman  MD for chart documentation. 4/2/2025  09:04 EDT

## 2025-03-19 ENCOUNTER — TELEPHONE (OUTPATIENT)
Dept: GASTROENTEROLOGY | Facility: CLINIC | Age: 65
End: 2025-03-19
Payer: COMMERCIAL

## 2025-04-02 RX ORDER — LIDOCAINE HYDROCHLORIDE 10 MG/ML
8 INJECTION, SOLUTION EPIDURAL; INFILTRATION; INTRACAUDAL; PERINEURAL
Status: COMPLETED | OUTPATIENT
Start: 2025-03-12 | End: 2025-03-12

## 2025-04-02 RX ORDER — TRIAMCINOLONE ACETONIDE 40 MG/ML
80 INJECTION, SUSPENSION INTRA-ARTICULAR; INTRAMUSCULAR
Status: COMPLETED | OUTPATIENT
Start: 2025-03-12 | End: 2025-03-12

## 2025-04-07 ENCOUNTER — OFFICE VISIT (OUTPATIENT)
Dept: GASTROENTEROLOGY | Facility: CLINIC | Age: 65
End: 2025-04-07
Payer: COMMERCIAL

## 2025-04-07 VITALS
DIASTOLIC BLOOD PRESSURE: 78 MMHG | SYSTOLIC BLOOD PRESSURE: 108 MMHG | BODY MASS INDEX: 34.75 KG/M2 | HEIGHT: 72 IN | WEIGHT: 256.6 LBS

## 2025-04-07 DIAGNOSIS — Z86.0101 PERSONAL HISTORY OF ADENOMATOUS AND SERRATED COLON POLYPS: ICD-10-CM

## 2025-04-07 DIAGNOSIS — K21.00 GASTROESOPHAGEAL REFLUX DISEASE WITH ESOPHAGITIS WITHOUT HEMORRHAGE: Primary | ICD-10-CM

## 2025-04-07 PROCEDURE — 99213 OFFICE O/P EST LOW 20 MIN: CPT

## 2025-04-07 RX ORDER — ERGOCALCIFEROL 1.25 MG/1
50000 CAPSULE, LIQUID FILLED ORAL
COMMUNITY
Start: 2025-03-12

## 2025-04-07 NOTE — PROGRESS NOTES
PATIENT INFORMATION  Sawyer Moraes       - 1960    CHIEF COMPLAINT  Chief Complaint   Patient presents with    Saavedra's esophagus    Heartburn    Difficulty Swallowing       HISTORY OF PRESENT ILLNESS  Here today for EGD follow-up    2025 EGD for dysphagia without stricture (attempted dilation), positive for mild gastropathy, reflux esophagitis with barretts, proximal gastric rest, negative for HP, dysplasia. Reviewed path and images with patient.    GERD: Managed on 10 mg Voquezna, at LOV was started on famotidine at night because having more frequent dysphagia episodes. Doing well since adding famotidine and not having any breakthrough for the last month and no trouble swallowing.    2023 EGD for HB and dysphagia, positive for stricture, mild chronic gastritis, erosive reflux esophagitis with barretts, no dysplasia, celiac, HP.      10/25/2021 last colon normal with hemorrhoids. Personal hx polyps.        REVIEWED PERTINENT RESULTS/ LABS  Lab Results   Component Value Date    CASEREPORT  2025     Surgical Pathology Report                         Case: [id number]                                  Authorizing Provider:  Quincy Tong        Collected:           2025 02:59 PM                                 MD Shanique                                                                   Ordering Location:     [facility]   Received:            2025 04:46 PM                                 OR                                                                           Pathologist:           Florence Williamson MD                                                    Specimens:   1) - Gastric, Antrum                                                                                2) - Esophagus, Distal                                                                              3) - Esophagus, esophageal polyp                                                            FINALDX  02/26/2025     1.  Stomach, biopsy:   -Gastropathy with focal intestinal metaplasia.   -Negative for Helicobacter by immunohistochemical stain.   -Negative for dysplasia.    2.  Distal esophagus, biopsy:  -Benign squamous and glandular mucosa with reflux esophagitis and rare focus of goblet cell metaplasia consistent with developing MARKS'S ESOPHAGUS.   -Negative for dysplasia.    3.  Esophagus, biopsy:   -Squamous and glandular mucosa with reflux and reactive foveolar hyperplasia.   -Negative for dysplasia.    [facility]       Lab Results   Component Value Date    HGB 14.1 05/09/2024    MCV 89.4 05/09/2024     05/09/2024    ALT 22 02/19/2024    AST 17 02/19/2024    HGBA1C 5.7 (H) 05/08/2024    FERRITIN 102.0 05/09/2024    IRON 50 (L) 05/09/2024    TIBC 271 05/09/2024      XR Knee 3 View Bilateral  Result Date: 3/12/2025  Narrative: Please see performing physician's note for result.      REVIEW OF SYSTEMS  Review of Systems      ACTIVE PROBLEMS  Patient Active Problem List    Diagnosis     Renal mass [N28.89]     Ureteral mass [N28.89]     Abnormal CT scan, kidney [R93.429]     Gross hematuria [R31.0]     History of esophageal stricture [Z87.19]     Marks's esophagus [K22.70]     Gastroesophageal reflux disease with esophagitis without hemorrhage [K21.00]     Esophageal dysphagia [R13.19]          PAST MEDICAL HISTORY  Past Medical History:   Diagnosis Date    Alcoholism     Asthma     about 6 months ago during covid had issues does not use inhaler    Marks esophagus     Cancer     skin cancers to back    Cervical disc disorder 2010    Had epidural injections which helped    Cholelithiasis Removed 2007    Colon polyp Benign- removed    Diarrhea     Frozen shoulder     GERD (gastroesophageal reflux disease)     Hypertension     Kidney trouble     only has one kidney    Knee sprain 09/23    Treated at [facility] for knee sprain    Knee swelling 2020    Saw Dr. Rivas Patton for knee problem.    Low back  strain     Lumbosacral disc disease  herniated disk    Epdural helped. Take oxycodone for pain    Periarthritis of shoulder     Rotator cuff syndrome     Was seeing Zulay prakash, have been taking Cortisone shots which help. PT. at [facility] for 6 months helped. Pain is returning.    Scoliosis     Sleep apnea     uses CPAP    Tendinitis of knee     Ureteral mass     Urgency of urination     frequency         SURGICAL HISTORY  Past Surgical History:   Procedure Laterality Date    COLONOSCOPY      CYSTOSCOPY RETROGRADE PYELOGRAM Right 2023    Procedure: CYSTOSCOPY BILATERAL  RETROGRADES WITH PYELOGRAM;  Surgeon: Vince Hughes MD;  Location: [facility] OR;  Service: Urology;  Laterality: Right;    ENDOSCOPY N/A 2023    Procedure: ESOPHAGOGASTRODUODENOSCOPY with 15-18mm Balloon;  Surgeon: Quincy Tong MD;  Location: [facility] OR;  Service: Gastroenterology;  Laterality: N/A;  Ulcerative Esophagitis, duodenitis, Gastritis, Esophageal Stricture    ENDOSCOPY N/A 2025    Procedure: ESOPHAGOGASTRODUODENOSCOPY WITH DILATATION and biopsy and polypectomy;  Surgeon: Quincy Tong MD;  Location: [facility];  Service: Gastroenterology;  Laterality: N/A;  esophageal polyp, gastric antrum biopsy x1, distal esophagus biopsy x1, esophagitis, gastritis    LAPAROSCOPIC CHOLECYSTECTOMY      NEPHROURETERECTOMY Left 2024    Procedure: ROBOTIC LEFT NEPHROURETERECTOMY;  Surgeon: Akira Barnett MD;  Location: [facility] OR;  Service: Robotics - DaVinci;  Laterality: Left;    UPPER GASTROINTESTINAL ENDOSCOPY           FAMILY HISTORY  Family History   Problem Relation Age of Onset    Diabetes Father     Heart disease Father     Alcohol abuse Brother             Alcohol abuse Mother     Malig Hyperthermia Neg Hx          SOCIAL HISTORY  Social History     Occupational History    Not on file   Tobacco Use    Smoking status: Never     Passive  exposure: Never    Smokeless tobacco: Never   Vaping Use    Vaping status: Never Used   Substance and Sexual Activity    Alcohol use: Yes     Alcohol/week: 2.0 standard drinks of alcohol     Types: 2 Shots of liquor per week     Comment: WEEKLY    Drug use: Not Currently     Types: Oxycodone     Comment: Was in treatment for opiod addiction    Sexual activity: Yes     Partners: Female     Birth control/protection: Vasectomy         CURRENT MEDICATIONS    Current Outpatient Medications:     cyclobenzaprine (FLEXERIL) 10 MG tablet, Take 1 tablet by mouth., Disp: , Rfl:     famotidine (PEPCID) 40 MG tablet, Take 1 tablet by mouth Every Night., Disp: 30 tablet, Rfl: 4    fluticasone (FLONASE) 50 MCG/ACT nasal spray, 1 spray by Each Nare route As Needed., Disp: , Rfl:     folic acid (FOLVITE) 1 MG tablet, Take 1 tablet by mouth Daily. HOLD FOR SURGERY, Disp: , Rfl:     gabapentin (NEURONTIN) 300 MG capsule, Take 1 capsule by mouth 3 (Three) Times a Day., Disp: , Rfl:     modafinil (PROVIGIL) 200 MG tablet, Take 1 tablet by mouth Daily., Disp: , Rfl:     NON FORMULARY, Compound pain cream, Disp: , Rfl:     sucralfate (CARAFATE) 1 g tablet, Take 1 tablet by mouth As Needed., Disp: , Rfl:     tamsulosin (FLOMAX) 0.4 MG capsule 24 hr capsule, Take 1 capsule by mouth 2 (Two) Times a Day., Disp: , Rfl:     valACYclovir (VALTREX) 500 MG tablet, Take 1 tablet by mouth As Needed., Disp: , Rfl:     vitamin D (ERGOCALCIFEROL) 1.25 MG (97852 UT) capsule capsule, Take 1 capsule by mouth Every 7 (Seven) Days., Disp: , Rfl:     Vonoprazan Fumarate (Voquezna) 10 MG tablet, Take 1 tablet by mouth Daily., Disp: 90 tablet, Rfl: 3    zolpidem (AMBIEN) 10 MG tablet, 1 tablet At Night As Needed., Disp: , Rfl:     ALLERGIES  Aspirin, Ibuprofen, Nsaids, and Sildenafil    VITALS  Vitals:    04/07/25 1021   BP: 108/78   BP Location: Left arm   Patient Position: Sitting   Cuff Size: Large Adult   Weight: 116 kg (256 lb 9.6 oz)   Height: 182.9 cm  "(72\")       PHYSICAL EXAM  Debilities/Disabilities Identified: None  Emotional Behavior: Appropriate  Wt Readings from Last 3 Encounters:   04/07/25 116 kg (256 lb 9.6 oz)   03/12/25 117 kg (258 lb)   02/26/25 117 kg (258 lb)     Ht Readings from Last 1 Encounters:   04/07/25 182.9 cm (72\")     Body mass index is 34.8 kg/m².  Physical Exam  Constitutional:       General: He is not in acute distress.     Appearance: Normal appearance. He is not ill-appearing.   HENT:      Head: Normocephalic and atraumatic.      Mouth/Throat:      Mouth: Mucous membranes are moist.      Pharynx: No posterior oropharyngeal erythema.   Eyes:      General: No scleral icterus.  Cardiovascular:      Rate and Rhythm: Normal rate and regular rhythm.      Heart sounds: Normal heart sounds.   Pulmonary:      Effort: Pulmonary effort is normal.      Breath sounds: Normal breath sounds.   Abdominal:      General: Abdomen is flat. Bowel sounds are normal. There is no distension.      Palpations: Abdomen is soft. There is no mass.      Tenderness: There is no abdominal tenderness. There is no guarding or rebound. Negative signs include Fu's sign.      Hernia: No hernia is present.   Musculoskeletal:      Cervical back: Neck supple.   Skin:     General: Skin is warm.      Capillary Refill: Capillary refill takes less than 2 seconds.   Neurological:      General: No focal deficit present.      Mental Status: He is alert and oriented to person, place, and time.   Psychiatric:         Mood and Affect: Mood normal.         Behavior: Behavior normal.         Thought Content: Thought content normal.         Judgment: Judgment normal.         CLINICAL DATA REVIEWED   reviewed previous lab results and integrated with today's visit, reviewed notes from other physicians and/or last GI encounter, reviewed previous endoscopy results and available photos, reviewed surgical pathology results from previous biopsies    ASSESSMENT  Diagnoses and all orders for " this visit:    Gastroesophageal reflux disease with esophagitis without hemorrhage    Personal history of adenomatous and serrated colon polyps    Other orders  -     vitamin D (ERGOCALCIFEROL) 1.25 MG (54488 UT) capsule capsule; Take 1 capsule by mouth Every 7 (Seven) Days.          PLAN    GERD: Well controlled on voquezna and famotidine at night    Return in about 6 months (around 10/7/2025).    I have discussed the above plan with the patient.  They verbalize understanding and are in agreement with the plan.  They have been advised to contact the office for any questions, concerns, or changes related to their health.

## 2025-05-14 ENCOUNTER — OFFICE VISIT (OUTPATIENT)
Dept: ORTHOPEDIC SURGERY | Facility: CLINIC | Age: 65
End: 2025-05-14
Payer: COMMERCIAL

## 2025-05-14 VITALS — BODY MASS INDEX: 34.67 KG/M2 | WEIGHT: 256 LBS | HEIGHT: 72 IN

## 2025-05-14 DIAGNOSIS — M94.261 CHONDROMALACIA OF KNEE, RIGHT: Primary | ICD-10-CM

## 2025-05-14 DIAGNOSIS — M17.11 PATELLOFEMORAL ARTHRITIS OF RIGHT KNEE: ICD-10-CM

## 2025-05-14 DIAGNOSIS — M17.0 PRIMARY OSTEOARTHRITIS OF BOTH KNEES: ICD-10-CM

## 2025-05-14 PROCEDURE — 99213 OFFICE O/P EST LOW 20 MIN: CPT | Performed by: ORTHOPAEDIC SURGERY

## 2025-05-14 NOTE — PROGRESS NOTES
Subjective:     Patient ID: Sawyer Moraes is a 65 y.o. male.    Chief Complaint:  Follow-up bilateral knee pain, chondromalacia, patellofemoral arthritis    History of Present Illness  History of Present Illness  Dorian returns to clinic today for follow-up evaluation regarding bilateral knee pain, he notes moderate improvement with previous cortisone injection that he had back on March 12th.  However the injection only helped for about 4 weeks, some mild distal improvement for about another 2 weeks but now his pain is back to where he started previously rates as a 7-8 out of 10 aching nature localized over the medial and anterior aspect of bilateral knees right slightly worse than left at this point in time.  Denies any hip or groin pain, denies radiation pain from the knee itself.  Moderate swelling that does wax and wane.     Social History     Occupational History    Not on file   Tobacco Use    Smoking status: Never     Passive exposure: Never    Smokeless tobacco: Never   Vaping Use    Vaping status: Never Used   Substance and Sexual Activity    Alcohol use: Yes     Alcohol/week: 2.0 standard drinks of alcohol     Types: 2 Shots of liquor per week     Comment: WEEKLY    Drug use: Not Currently     Types: Oxycodone     Comment: Was in treatment for opiod addiction    Sexual activity: Yes     Partners: Female     Birth control/protection: Vasectomy      Past Medical History:   Diagnosis Date    Alcoholism     Asthma     about 6 months ago during covid had issues does not use inhaler    Saavedra esophagus     Cancer     skin cancers to back    Cervical disc disorder 2010    Had epidural injections which helped    Cholelithiasis Removed 2007    Colon polyp Benign- removed    Diarrhea     Frozen shoulder     GERD (gastroesophageal reflux disease)     Hypertension     Kidney trouble     only has one kidney    Knee sprain 09/23    Treated at [facility] for knee sprain    Knee swelling 2020    Saw Dr. Rivas Patton for knee  "problem.    Low back strain     Lumbosacral disc disease  herniated disk    Epdural helped. Take oxycodone for pain    Periarthritis of shoulder     Rotator cuff syndrome     Was seeing Zulay prakash, have been taking Cortisone shots which help. PT. at [facility] for 6 months helped. Pain is returning.    Scoliosis     Sleep apnea     uses CPAP    Tendinitis of knee     Ureteral mass     Urgency of urination     frequency     Past Surgical History:   Procedure Laterality Date    COLONOSCOPY      CYSTOSCOPY RETROGRADE PYELOGRAM Right 2023    Procedure: CYSTOSCOPY BILATERAL  RETROGRADES WITH PYELOGRAM;  Surgeon: Vince Hughes MD;  Location: [facility] OR;  Service: Urology;  Laterality: Right;    ENDOSCOPY N/A 2023    Procedure: ESOPHAGOGASTRODUODENOSCOPY with 15-18mm Balloon;  Surgeon: Quincy Tong MD;  Location: [facility] OR;  Service: Gastroenterology;  Laterality: N/A;  Ulcerative Esophagitis, duodenitis, Gastritis, Esophageal Stricture    ENDOSCOPY N/A 2025    Procedure: ESOPHAGOGASTRODUODENOSCOPY WITH DILATATION and biopsy and polypectomy;  Surgeon: Quincy Tong MD;  Location: [facility] OR;  Service: Gastroenterology;  Laterality: N/A;  esophageal polyp, gastric antrum biopsy x1, distal esophagus biopsy x1, esophagitis, gastritis    LAPAROSCOPIC CHOLECYSTECTOMY      NEPHROURETERECTOMY Left 2024    Procedure: ROBOTIC LEFT NEPHROURETERECTOMY;  Surgeon: Akira Barnett MD;  Location: [facility] OR;  Service: Robotics - DaVinci;  Laterality: Left;    UPPER GASTROINTESTINAL ENDOSCOPY         Family History   Problem Relation Age of Onset    Diabetes Father     Heart disease Father     Alcohol abuse Brother             Alcohol abuse Mother     Malig Hyperthermia Neg Hx          Review of Systems        Objective:  Vitals:    25 154   Weight: 116 kg (256 lb)   Height: 182.9 cm (72\")         25  1549 "   Weight: 116 kg (256 lb)     Body mass index is 34.72 kg/m².  Physical Exam    Vital signs reviewed.   General: No acute distress, alert and oriented  Eyes: conjunctiva clear; pupils equally round and reactive  ENT: external ears and nose atraumatic; oropharynx clear  CV: no peripheral edema  Resp: normal respiratory effort  Skin: no rashes or wounds; normal turgor  Psych: mood and affect appropriate; recent and remote memory intact          Physical Exam         Bilateral knees-active range of motion 0 to 120 degrees, 4+ out of 5 strength in flexion extension, moderate effusion, maximal tenderness palpation medial joint line as well as medial lateral patellar facet, positive active patella compression test.  Stable to varus and valgus stress at 0 and 30 degrees.  No hip pain on logroll or Stinchfield exam.  Imaging:  None today    Review again of previous x-rays from March 12, 2025 with independent interpretation of images indicates moderate medial and patellofemoral compartment joint space narrowing with slight varus alignment    Assessment:        1. Chondromalacia of knee, right    2. Patellofemoral arthritis of right knee    3. Primary osteoarthritis of both knees           Plan:          Assessment & Plan  Given significant recurrence of pain and limited efficacy as well as duration of treatment with intra-articular cortisone injections, we discussed options including not limited to observation, viscosupplement injection, continue home exercises and bracing, and total knee arthroplasty.  At this point in time patient would like to proceed with viscosupplement injection-order entered for authorization at this point.  Continue with home exercise for work on range of motion and strength as tolerated.    Sawyer Moraes was in agreement with plan and had all questions answered.     Orders:  Orders Placed This Encounter   Procedures    Visco Treatment       Medications:  No orders of the defined types were placed in  this encounter.      Followup:  No follow-ups on file.    Diagnoses and all orders for this visit:    1. Chondromalacia of knee, right (Primary)  -     Visco Treatment; Future    2. Patellofemoral arthritis of right knee  -     Visco Treatment; Future    3. Primary osteoarthritis of both knees  -     Visco Treatment; Future                  Dictated utilizing Dragon dictation     Patient or patient representative verbalized consent for the use of Ambient Listening during the visit with  Steve Altman MD for chart documentation. 5/27/2025  16:17 EDT

## 2025-05-28 ENCOUNTER — CLINICAL SUPPORT (OUTPATIENT)
Dept: ORTHOPEDIC SURGERY | Facility: CLINIC | Age: 65
End: 2025-05-28
Payer: COMMERCIAL

## 2025-05-28 VITALS — BODY MASS INDEX: 34.67 KG/M2 | WEIGHT: 256 LBS | HEIGHT: 72 IN

## 2025-05-28 DIAGNOSIS — M17.11 PATELLOFEMORAL ARTHRITIS OF RIGHT KNEE: Primary | ICD-10-CM

## 2025-05-28 NOTE — PROGRESS NOTES
Subjective: Osteoarthritis right knee     Patient ID: Sawyer Moraes is a 65 y.o. male.    Chief Complaint:    History of Present Illness 65-year-old male seen for Durolane injection into the right knee       Social History     Occupational History    Not on file   Tobacco Use    Smoking status: Never     Passive exposure: Never    Smokeless tobacco: Never   Vaping Use    Vaping status: Never Used   Substance and Sexual Activity    Alcohol use: Yes     Alcohol/week: 2.0 standard drinks of alcohol     Types: 2 Shots of liquor per week     Comment: WEEKLY    Drug use: Not Currently     Types: Oxycodone     Comment: Was in treatment for opiod addiction    Sexual activity: Yes     Partners: Female     Birth control/protection: Vasectomy      Review of Systems      Past Medical History:   Diagnosis Date    Alcoholism     Asthma     about 6 months ago during covid had issues does not use inhaler    Saavedra esophagus     Cancer     skin cancers to back    Cervical disc disorder 2010    Had epidural injections which helped    Cholelithiasis Removed 2007    Colon polyp Benign- removed    Diarrhea     Frozen shoulder     GERD (gastroesophageal reflux disease)     Hypertension     Kidney trouble     only has one kidney    Knee sprain 09/23    Treated at [facility] for knee sprain    Knee swelling 2020    Saw Dr. Rivas Patton for knee problem.    Low back strain 2010    Lumbosacral disc disease 2015 herniated disk    Epdural helped. Take oxycodone for pain    Periarthritis of shoulder 2020    Rotator cuff syndrome 2020    Was seeing Zulay prakash, have been taking Cortisone shots which help. PT. at [facility] for 6 months helped. Pain is returning.    Scoliosis 2010    Sleep apnea     uses CPAP    Tendinitis of knee 2010    Ureteral mass     Urgency of urination     frequency     Past Surgical History:   Procedure Laterality Date    COLONOSCOPY      CYSTOSCOPY RETROGRADE PYELOGRAM Right 09/11/2023    Procedure: CYSTOSCOPY  BILATERAL  RETROGRADES WITH PYELOGRAM;  Surgeon: Vince Hughes MD;  Location: [facility] OR;  Service: Urology;  Laterality: Right;    ENDOSCOPY N/A 2023    Procedure: ESOPHAGOGASTRODUODENOSCOPY with 15-18mm Balloon;  Surgeon: Quincy Tong MD;  Location: [facility] OR;  Service: Gastroenterology;  Laterality: N/A;  Ulcerative Esophagitis, duodenitis, Gastritis, Esophageal Stricture    ENDOSCOPY N/A 2025    Procedure: ESOPHAGOGASTRODUODENOSCOPY WITH DILATATION and biopsy and polypectomy;  Surgeon: Quincy Tong MD;  Location: [facility] OR;  Service: Gastroenterology;  Laterality: N/A;  esophageal polyp, gastric antrum biopsy x1, distal esophagus biopsy x1, esophagitis, gastritis    LAPAROSCOPIC CHOLECYSTECTOMY      NEPHROURETERECTOMY Left 2024    Procedure: ROBOTIC LEFT NEPHROURETERECTOMY;  Surgeon: Akira Barnett MD;  Location: [facility] MAIN OR;  Service: Robotics - DaVinci;  Laterality: Left;    UPPER GASTROINTESTINAL ENDOSCOPY       Family History   Problem Relation Age of Onset    Diabetes Father     Heart disease Father     Alcohol abuse Brother             Alcohol abuse Mother     Malig Hyperthermia Neg Hx          Objective:  There were no vitals filed for this visit.      25  1007   Weight: 116 kg (256 lb)     Body mass index is 34.72 kg/m².           Ortho Exam   right knee shows no swelling effusion erythema and is cool to touch.  Therefore through the superolateral portal after sterile prepping underwent the 6 mL Durolane injection.  Postinjection instructions given.    Assessment:        1. Patellofemoral arthritis of right knee           Plan: Return to see me as needed.  Answered all questions    - Large Joint Arthrocentesis: R knee on 2025 10:14 AM  Indications: pain  Details: 21 G needle, superolateral approach  Medications: 60 mg Sodium Hyaluronate 60 MG/3ML  Outcome: tolerated well, no immediate complications  Procedure,  treatment alternatives, risks and benefits explained, specific risks discussed. Consent was given by the patient. Immediately prior to procedure a time out was called to verify the correct patient, procedure, equipment, support staff and site/side marked as required. Patient was prepped and draped in the usual sterile fashion.                   Work Status:    WALTER query complete.    Orders:  Orders Placed This Encounter   Procedures    - Large Joint Arthrocentesis: R knee       Medications:  No orders of the defined types were placed in this encounter.      Followup:  Return if symptoms worsen or fail to improve.          Dictated utilizing Dragon dictation

## 2025-07-16 DIAGNOSIS — K21.00 GASTROESOPHAGEAL REFLUX DISEASE WITH ESOPHAGITIS WITHOUT HEMORRHAGE: ICD-10-CM

## 2025-07-16 RX ORDER — VONOPRAZAN FUMARATE 13.36 MG/1
10 TABLET ORAL DAILY
Qty: 90 TABLET | Refills: 3 | Status: SHIPPED | OUTPATIENT
Start: 2025-07-16

## 2025-07-24 ENCOUNTER — OFFICE VISIT (OUTPATIENT)
Dept: ORTHOPEDIC SURGERY | Facility: CLINIC | Age: 65
End: 2025-07-24
Payer: COMMERCIAL

## 2025-07-24 VITALS — HEIGHT: 72 IN | BODY MASS INDEX: 34.67 KG/M2 | WEIGHT: 256 LBS

## 2025-07-24 DIAGNOSIS — M17.11 PATELLOFEMORAL ARTHRITIS OF RIGHT KNEE: Primary | ICD-10-CM

## 2025-07-24 DIAGNOSIS — M17.12 PATELLOFEMORAL ARTHRITIS OF LEFT KNEE: ICD-10-CM

## 2025-07-24 PROCEDURE — 99213 OFFICE O/P EST LOW 20 MIN: CPT | Performed by: ORTHOPAEDIC SURGERY

## 2025-07-24 NOTE — PROGRESS NOTES
Subjective: Bilateral patellofemoral arthritis     Patient ID: Sawyer Moraes is a 65 y.o. male.    Chief Complaint:    History of Present Illness 65-year male seen in follow-up to having a gel injection in the right knee has noted improvement although is beginning to have some discomfort again.  The left knee remains symptomatic as far as limited activity.       Social History     Occupational History    Not on file   Tobacco Use    Smoking status: Never     Passive exposure: Never    Smokeless tobacco: Never   Vaping Use    Vaping status: Never Used   Substance and Sexual Activity    Alcohol use: Yes     Alcohol/week: 2.0 standard drinks of alcohol     Types: 2 Shots of liquor per week     Comment: WEEKLY    Drug use: Not Currently     Types: Oxycodone     Comment: Was in treatment for opiod addiction    Sexual activity: Yes     Partners: Female     Birth control/protection: Vasectomy      Review of Systems      Past Medical History:   Diagnosis Date    Alcoholism     Asthma     about 6 months ago during covid had issues does not use inhaler    Saavedra esophagus     Cancer     skin cancers to back    Cervical disc disorder 2010    Had epidural injections which helped    Cholelithiasis Removed 2007    Colon polyp Benign- removed    Diarrhea     Frozen shoulder     GERD (gastroesophageal reflux disease)     Hypertension     Kidney trouble     only has one kidney    Knee sprain 09/23    Treated at [facility] for knee sprain    Knee swelling 2020    Saw Dr. Rivas Patton for knee problem.    Low back strain 2010    Lumbosacral disc disease 2015 herniated disk    Epdural helped. Take oxycodone for pain    Periarthritis of shoulder 2020    Rotator cuff syndrome 2020    Was seeing Zulay Patton ortho, have been taking Cortisone shots which help. PT. at [facility] for 6 months helped. Pain is returning.    Scoliosis 2010    Sleep apnea     uses CPAP    Tendinitis of knee 2010    Ureteral mass     Urgency of urination      frequency     Past Surgical History:   Procedure Laterality Date    COLONOSCOPY      CYSTOSCOPY RETROGRADE PYELOGRAM Right 2023    Procedure: CYSTOSCOPY BILATERAL  RETROGRADES WITH PYELOGRAM;  Surgeon: Vince Hughes MD;  Location: [facility] OR;  Service: Urology;  Laterality: Right;    ENDOSCOPY N/A 2023    Procedure: ESOPHAGOGASTRODUODENOSCOPY with 15-18mm Balloon;  Surgeon: Quincy Tong MD;  Location: [facility] OR;  Service: Gastroenterology;  Laterality: N/A;  Ulcerative Esophagitis, duodenitis, Gastritis, Esophageal Stricture    ENDOSCOPY N/A 2025    Procedure: ESOPHAGOGASTRODUODENOSCOPY WITH DILATATION and biopsy and polypectomy;  Surgeon: Quincy Tong MD;  Location: [facility] OR;  Service: Gastroenterology;  Laterality: N/A;  esophageal polyp, gastric antrum biopsy x1, distal esophagus biopsy x1, esophagitis, gastritis    LAPAROSCOPIC CHOLECYSTECTOMY      NEPHROURETERECTOMY Left 2024    Procedure: ROBOTIC LEFT NEPHROURETERECTOMY;  Surgeon: Akira Barnett MD;  Location: [facility] OR;  Service: Robotics - DaVinci;  Laterality: Left;    UPPER GASTROINTESTINAL ENDOSCOPY       Family History   Problem Relation Age of Onset    Diabetes Father     Heart disease Father     Alcohol abuse Brother             Alcohol abuse Mother     Malig Hyperthermia Neg Hx          Objective:  There were no vitals filed for this visit.      25  1059   Weight: 116 kg (256 lb)     Body mass index is 34.72 kg/m².           Ortho Exam   he is alert and oriented x 3.  His exam is unchanged from previous visit.  There is patellofemoral crepitus with range of motion but there is no instability.    Assessment:        1. Patellofemoral arthritis of right knee    2. Patellofemoral arthritis of left knee           Plan: Patient inquired about getting a cortisone injection in the right knee I told her I like to wait at least another 3 to 4 weeks.  As far as the left  knee he wants to proceed with a gel injection in that side.  He is off on Monday so he is requesting to get the injection on Monday I will ask if Rowena can give him the gel injection into the left knee.  He return as needed for cortisone injection in the right knee.  Did state that we can do cortisone alternating with gel injection to try to keep him comfortable.  Answered all questions.  Return for the gel injection into the left knee in the next 2 to 3 weeks            Work Status:    WALTER query complete.    Orders:  Orders Placed This Encounter   Procedures    Visco Treatment       Medications:  No orders of the defined types were placed in this encounter.      Followup:  Return in about 3 weeks (around 8/14/2025).          Dictated utilizing Dragon dictation

## 2025-08-11 ENCOUNTER — CLINICAL SUPPORT (OUTPATIENT)
Dept: ORTHOPEDIC SURGERY | Facility: CLINIC | Age: 65
End: 2025-08-11
Payer: COMMERCIAL

## 2025-08-11 VITALS — WEIGHT: 256 LBS | HEIGHT: 72 IN | BODY MASS INDEX: 34.67 KG/M2

## 2025-08-11 DIAGNOSIS — M17.12 PRIMARY OSTEOARTHRITIS OF LEFT KNEE: Primary | ICD-10-CM

## (undated) DEVICE — SUT VIC 2/0 TIES 54IN J607H

## (undated) DEVICE — RETRV BG SPECI ENDOPOUCH

## (undated) DEVICE — Device

## (undated) DEVICE — CVR HNDL LIGHT HARMONYAIR SURG STRL

## (undated) DEVICE — ELECTRD BLD EZ CLN MOD 6.5IN

## (undated) DEVICE — TRY SKINPREP SCRB CHG

## (undated) DEVICE — ST TBG AIRSEAL FLTR TRI LUM

## (undated) DEVICE — KT ORCA ORCAPOD DISP STRL

## (undated) DEVICE — SOL NACL 0.9PCT 1000ML

## (undated) DEVICE — GLV SURG SENSICARE PI MIC PF SZ7.5 LF STRL

## (undated) DEVICE — CATH URETRL FLXITP POLLACK STD 5F 70CM

## (undated) DEVICE — VIAL FORMALIN CAP 10P 40ML

## (undated) DEVICE — JACKT LAB F/R KNIT CUFF/COLR XLG BLU

## (undated) DEVICE — SYS VISABILITY LAP/SCPE LAPAROVUE CLN WARM 2/PRT 3TO12MM

## (undated) DEVICE — BITEBLOCK OMNI BLOC

## (undated) DEVICE — ESOPHAGEAL BALLOON DILATATION CATHETER: Brand: CRE FIXED WIRE

## (undated) DEVICE — ADHS SKIN SURG TISS VISC PREMIERPRO EXOFIN HI/VISC FAST/DRY

## (undated) DEVICE — THE BITE BLOCK MAXI, LATEX FREE STRAP IS USED TO PROTECT THE ENDOSCOPE INSERTION TUBE FROM BEING BITTEN BY THE PATIENT.

## (undated) DEVICE — DRN WND RESVR BULB JP SIL 100ML

## (undated) DEVICE — SUT MNCRYL PLS ANTIB UD 4/0 PS2 18IN

## (undated) DEVICE — TP SXN YANKR WO/ VNT CLR LF

## (undated) DEVICE — GLV SURG SENSICARE PI MIC PF SZ8 LF STRL

## (undated) DEVICE — VIAL FORMLN CAP 10PCT 20ML

## (undated) DEVICE — FRCP BX RADJAW4 NDL 2.8 240CM LG OG BX40

## (undated) DEVICE — SOL IRR H2O BTL 1000ML STRL

## (undated) DEVICE — PATIENT RETURN ELECTRODE, SINGLE-USE, CONTACT QUALITY MONITORING, ADULT, WITH 9FT CORD, FOR PATIENTS WEIGING OVER 33LBS. (15KG): Brand: MEGADYNE

## (undated) DEVICE — PK DAVINCI 40

## (undated) DEVICE — ESOPHAGEAL/PYLORIC/COLONIC WIREGUIDED BALLOON DILATATION CATHETER: Brand: CRE WIREGUIDED

## (undated) DEVICE — DEV INFL ALLIANCE2 SYS

## (undated) DEVICE — STPLR SKIN VISISTAT WD 35CT

## (undated) DEVICE — SINGLE-USE BIOPSY FORCEPS: Brand: RADIAL JAW 4

## (undated) DEVICE — PENCL ES MEGADINE EZ/CLEAN BUTN W/HOLSTR 10FT

## (undated) DEVICE — ADAPT CLN BIOGUARD AIR/H2O DISP

## (undated) DEVICE — GLW STD ANG 3CM .035X150CM

## (undated) DEVICE — DRP SURG LAP T/DRP 102X78X121IN STRL

## (undated) DEVICE — SOL ANTISTICK CAUTRY ELECTROLUBE LF

## (undated) DEVICE — SUT VIC 1 CT 36IN J959H

## (undated) DEVICE — SUT VIC 2/0 SH 27IN UD VCP417H

## (undated) DEVICE — APPL HEMOS FOR DELIVERY FLOSEAL

## (undated) DEVICE — DEV INFL BALN BIG60 W/GAUGE 60ML

## (undated) DEVICE — BW-412T DISP COMBO CLEANING BRUSH: Brand: SINGLE USE COMBINATION CLEANING BRUSH

## (undated) DEVICE — PCH INST SURG INVISISHIELD 2PCKT

## (undated) DEVICE — SOL IRR H2O BO 1000ML STRL

## (undated) DEVICE — DRSNG WND BORDR/ADHS NONADHR/GZ LF 4X14IN STRL

## (undated) DEVICE — GOWN ISOL W/THUMB UNIV BLU BX/15

## (undated) DEVICE — PK CYSTO 90

## (undated) DEVICE — DRP ARM DAVINCI/X/XI DISP BX20

## (undated) DEVICE — TROC BLADLES ANCHORPORT/OPTI LP 12X120MM 1P/U

## (undated) DEVICE — SYR LL W/SCALE/MARK 3ML STRL

## (undated) DEVICE — SXN/IRR STRYKEFLOW2 W/TIPS 5 32CM

## (undated) DEVICE — SUT SILK 0 TIES 30IN A306H

## (undated) DEVICE — TRANSPOSAL ULTRAFLEX DUO/QUAD ULTRA CART MANIFOLD

## (undated) DEVICE — GOWN SURG SIRUS NONREINF W/SLV XL STRL

## (undated) DEVICE — TOWL STRL OR PREWSH COTN 17X27IN BLU DISP STRL PK/4

## (undated) DEVICE — OBT BLADLES ENDOWRIST DAVINCI/X/XI 8MM DISP

## (undated) DEVICE — GLV SURG BIOGEL LTX PF 8

## (undated) DEVICE — LINER SURG CANSTR SXN S/RIGD 1500CC

## (undated) DEVICE — SYS FILT LAP LAPAROSHIELD EVAC SMOKE STRL BX/10

## (undated) DEVICE — CVR TP ENDOWRIST DAVINCI 8MM DISP

## (undated) DEVICE — MSK ENDO PORT O2 POM ELITE CURAPLEX A/

## (undated) DEVICE — NITINOL WIRE WITH HYDROPHILIC TIP: Brand: SENSOR

## (undated) DEVICE — SUT SILK 2/0 SH 75CM 30IN BLK C016D

## (undated) DEVICE — LP VESL MAXI 2.5X1MM RED 2PK

## (undated) DEVICE — STPLR LNR CUT ECHELON ART FLX45 STD

## (undated) DEVICE — TBG SXN CONN/M 1/4IN 20FT LF STRL

## (undated) DEVICE — SEAL UNIV DAVINCI/X/XI 5TO12MM

## (undated) DEVICE — CATH URETRL PA CONE TP 8F

## (undated) DEVICE — SUT SILK 2/0 SUTUPAK TIES 24IN SA75H